# Patient Record
Sex: FEMALE | Race: WHITE | NOT HISPANIC OR LATINO | Employment: FULL TIME | URBAN - METROPOLITAN AREA
[De-identification: names, ages, dates, MRNs, and addresses within clinical notes are randomized per-mention and may not be internally consistent; named-entity substitution may affect disease eponyms.]

---

## 2018-01-16 ENCOUNTER — OFFICE VISIT CONVERTED (OUTPATIENT)
Dept: SURGERY | Facility: CLINIC | Age: 38
End: 2018-01-16
Attending: SURGERY

## 2018-01-19 ENCOUNTER — OFFICE VISIT CONVERTED (OUTPATIENT)
Dept: INTERNAL MEDICINE | Facility: CLINIC | Age: 38
End: 2018-01-19
Attending: INTERNAL MEDICINE

## 2018-03-02 ENCOUNTER — OFFICE VISIT CONVERTED (OUTPATIENT)
Dept: INTERNAL MEDICINE | Facility: CLINIC | Age: 38
End: 2018-03-02
Attending: INTERNAL MEDICINE

## 2018-05-02 ENCOUNTER — OFFICE VISIT CONVERTED (OUTPATIENT)
Dept: INTERNAL MEDICINE | Facility: CLINIC | Age: 38
End: 2018-05-02
Attending: INTERNAL MEDICINE

## 2018-08-03 ENCOUNTER — OFFICE VISIT CONVERTED (OUTPATIENT)
Dept: INTERNAL MEDICINE | Facility: CLINIC | Age: 38
End: 2018-08-03
Attending: INTERNAL MEDICINE

## 2018-11-13 ENCOUNTER — OFFICE VISIT CONVERTED (OUTPATIENT)
Dept: INTERNAL MEDICINE | Facility: CLINIC | Age: 38
End: 2018-11-13
Attending: INTERNAL MEDICINE

## 2019-02-11 ENCOUNTER — HOSPITAL ENCOUNTER (OUTPATIENT)
Dept: OTHER | Facility: HOSPITAL | Age: 39
Discharge: HOME | End: 2019-02-11
Attending: INTERNAL MEDICINE

## 2019-02-11 ENCOUNTER — OFFICE VISIT CONVERTED (OUTPATIENT)
Dept: INTERNAL MEDICINE | Facility: CLINIC | Age: 39
End: 2019-02-11
Attending: INTERNAL MEDICINE

## 2019-02-11 LAB
ALBUMIN SERPL-MCNC: 3.7 G/DL (ref 3.5–5)
ALBUMIN/GLOB SERPL: 1 {RATIO} (ref 1.4–2.6)
ALP SERPL-CCNC: 86 U/L (ref 42–98)
ALT SERPL-CCNC: 46 U/L (ref 10–40)
ANION GAP SERPL CALC-SCNC: 17 MMOL/L (ref 8–19)
AST SERPL-CCNC: 38 U/L (ref 15–50)
BASOPHILS # BLD AUTO: 0.17 10*3/UL (ref 0–0.2)
BASOPHILS NFR BLD AUTO: 1.77 % (ref 0–3)
BILIRUB SERPL-MCNC: 0.25 MG/DL (ref 0.2–1.3)
BUN SERPL-MCNC: 9 MG/DL (ref 5–25)
BUN/CREAT SERPL: 13 {RATIO} (ref 6–20)
CALCIUM SERPL-MCNC: 9.5 MG/DL (ref 8.7–10.4)
CHLORIDE SERPL-SCNC: 102 MMOL/L (ref 99–111)
CONV CO2: 25 MMOL/L (ref 22–32)
CONV TOTAL PROTEIN: 7.3 G/DL (ref 6.3–8.2)
CORTIS AM PEAK SERPL-MCNC: 10.2 UG/DL (ref 6–18.4)
CREAT UR-MCNC: 0.72 MG/DL (ref 0.5–0.9)
CRP SERPL-MCNC: 28.2 MG/L (ref 0–5)
EOSINOPHIL # BLD AUTO: 0.49 10*3/UL (ref 0–0.7)
EOSINOPHIL # BLD AUTO: 5.21 % (ref 0–7)
ERYTHROCYTE [DISTWIDTH] IN BLOOD BY AUTOMATED COUNT: 12.1 % (ref 11.5–14.5)
ERYTHROCYTE [SEDIMENTATION RATE] IN BLOOD: 60 MM/H (ref 0–20)
GFR SERPLBLD BASED ON 1.73 SQ M-ARVRAT: >60 ML/MIN/{1.73_M2}
GLOBULIN UR ELPH-MCNC: 3.6 G/DL (ref 2–3.5)
GLUCOSE SERPL-MCNC: 98 MG/DL (ref 65–99)
HBA1C MFR BLD: 14.2 G/DL (ref 12–16)
HCT VFR BLD AUTO: 42.1 % (ref 37–47)
LYMPHOCYTES # BLD AUTO: 3.01 10*3/UL (ref 1–5)
MCH RBC QN AUTO: 30.9 PG (ref 27–31)
MCHC RBC AUTO-ENTMCNC: 33.6 G/DL (ref 33–37)
MCV RBC AUTO: 91.8 FL (ref 81–99)
MONOCYTES # BLD AUTO: 0.4 10*3/UL (ref 0.2–1.2)
MONOCYTES NFR BLD AUTO: 4.27 % (ref 3–10)
NEUTROPHILS # BLD AUTO: 5.34 10*3/UL (ref 2–8)
NEUTROPHILS NFR BLD AUTO: 56.8 % (ref 30–85)
NRBC BLD AUTO-RTO: 0 % (ref 0–0.01)
OSMOLALITY SERPL CALC.SUM OF ELEC: 289 MOSM/KG (ref 273–304)
PLATELET # BLD AUTO: 420 10*3/UL (ref 130–400)
PMV BLD AUTO: 7.9 FL (ref 7.4–10.4)
POTASSIUM SERPL-SCNC: 4.4 MMOL/L (ref 3.5–5.3)
RBC # BLD AUTO: 4.58 10*6/UL (ref 4.2–5.4)
SODIUM SERPL-SCNC: 140 MMOL/L (ref 135–147)
T4 FREE SERPL-MCNC: 0.7 NG/DL (ref 0.9–1.8)
TSH SERPL-ACNC: 46.32 M[IU]/L (ref 0.27–4.2)
URATE SERPL-MCNC: 5.1 MG/DL (ref 2.5–7.5)
VARIANT LYMPHS NFR BLD MANUAL: 32 % (ref 20–45)
WBC # BLD AUTO: 9.41 10*3/UL (ref 4.8–10.8)

## 2019-02-12 LAB — CONV ANTI NUCLEAR ANTIBODY WITH REFLEX: NEGATIVE

## 2019-02-14 LAB — CCP IGA+IGG SERPL IA-ACNC: 243 UNITS (ref 0–19)

## 2019-02-15 LAB
CONV RHEUMATOID FACTOR IGA: 84 UNITS (ref 0–6)
CONV RHEUMATOID FACTOR IGG: >100 UNITS (ref 0–6)
CONV RHEUMATOID FACTOR IGM: >100 UNITS (ref 0–6)

## 2019-05-13 ENCOUNTER — HOSPITAL ENCOUNTER (OUTPATIENT)
Dept: SURGERY | Facility: CLINIC | Age: 39
Discharge: HOME | End: 2019-05-13
Attending: INTERNAL MEDICINE

## 2019-05-13 ENCOUNTER — OFFICE VISIT CONVERTED (OUTPATIENT)
Dept: INTERNAL MEDICINE | Facility: CLINIC | Age: 39
End: 2019-05-13
Attending: INTERNAL MEDICINE

## 2019-05-13 LAB
T4 FREE SERPL-MCNC: 0.7 NG/DL (ref 0.9–1.8)
TSH SERPL-ACNC: 138.7 M[IU]/L (ref 0.27–4.2)

## 2019-05-30 ENCOUNTER — HOSPITAL ENCOUNTER (OUTPATIENT)
Dept: OTHER | Facility: HOSPITAL | Age: 39
Discharge: HOME | End: 2019-05-30
Attending: INTERNAL MEDICINE

## 2019-05-30 LAB
T4 FREE SERPL-MCNC: 1.3 NG/DL (ref 0.9–1.8)
TSH SERPL-ACNC: 26.72 M[IU]/L (ref 0.27–4.2)

## 2019-08-01 ENCOUNTER — OFFICE VISIT CONVERTED (OUTPATIENT)
Dept: INTERNAL MEDICINE | Facility: CLINIC | Age: 39
End: 2019-08-01
Attending: INTERNAL MEDICINE

## 2019-08-01 ENCOUNTER — CONVERSION ENCOUNTER (OUTPATIENT)
Dept: INTERNAL MEDICINE | Facility: CLINIC | Age: 39
End: 2019-08-01

## 2019-10-03 ENCOUNTER — CONVERSION ENCOUNTER (OUTPATIENT)
Dept: INTERNAL MEDICINE | Facility: CLINIC | Age: 39
End: 2019-10-03

## 2019-10-03 ENCOUNTER — HOSPITAL ENCOUNTER (OUTPATIENT)
Dept: OTHER | Facility: HOSPITAL | Age: 39
Discharge: HOME | End: 2019-10-03
Attending: INTERNAL MEDICINE

## 2019-10-03 ENCOUNTER — OFFICE VISIT CONVERTED (OUTPATIENT)
Dept: INTERNAL MEDICINE | Facility: CLINIC | Age: 39
End: 2019-10-03
Attending: INTERNAL MEDICINE

## 2019-10-03 LAB
ALBUMIN SERPL-MCNC: 3.9 G/DL (ref 3.5–5)
ALBUMIN/GLOB SERPL: 1 {RATIO} (ref 1.4–2.6)
ALP SERPL-CCNC: 108 U/L (ref 42–98)
ALT SERPL-CCNC: 83 U/L (ref 10–40)
ANION GAP SERPL CALC-SCNC: 16 MMOL/L (ref 8–19)
AST SERPL-CCNC: 103 U/L (ref 15–50)
BASOPHILS # BLD AUTO: 0.11 10*3/UL (ref 0–0.2)
BASOPHILS NFR BLD AUTO: 0.9 % (ref 0–3)
BILIRUB SERPL-MCNC: 0.37 MG/DL (ref 0.2–1.3)
BUN SERPL-MCNC: 7 MG/DL (ref 5–25)
BUN/CREAT SERPL: 11 {RATIO} (ref 6–20)
CALCIUM SERPL-MCNC: 9.9 MG/DL (ref 8.7–10.4)
CHLORIDE SERPL-SCNC: 99 MMOL/L (ref 99–111)
CHOLEST SERPL-MCNC: 200 MG/DL (ref 107–200)
CHOLEST/HDLC SERPL: 4.1 {RATIO} (ref 3–6)
CONV ABS IMM GRAN: 0.11 10*3/UL (ref 0–0.2)
CONV CO2: 25 MMOL/L (ref 22–32)
CONV IMMATURE GRAN: 0.9 % (ref 0–1.8)
CONV TOTAL PROTEIN: 7.9 G/DL (ref 6.3–8.2)
CREAT UR-MCNC: 0.64 MG/DL (ref 0.5–0.9)
DEPRECATED RDW RBC AUTO: 49.9 FL (ref 36.4–46.3)
EOSINOPHIL # BLD AUTO: 0.88 10*3/UL (ref 0–0.7)
EOSINOPHIL # BLD AUTO: 7.6 % (ref 0–7)
ERYTHROCYTE [DISTWIDTH] IN BLOOD BY AUTOMATED COUNT: 13.7 % (ref 11.7–14.4)
EST. AVERAGE GLUCOSE BLD GHB EST-MCNC: 186 MG/DL
GFR SERPLBLD BASED ON 1.73 SQ M-ARVRAT: >60 ML/MIN/{1.73_M2}
GLOBULIN UR ELPH-MCNC: 4 G/DL (ref 2–3.5)
GLUCOSE SERPL-MCNC: 209 MG/DL (ref 65–99)
HBA1C MFR BLD: 8.1 % (ref 3.5–5.7)
HCT VFR BLD AUTO: 43.2 % (ref 37–47)
HDLC SERPL-MCNC: 49 MG/DL (ref 40–60)
HGB BLD-MCNC: 13.6 G/DL (ref 12–16)
IRON SATN MFR SERPL: 23 % (ref 20–55)
IRON SERPL-MCNC: 61 UG/DL (ref 60–170)
LDLC SERPL CALC-MCNC: 123 MG/DL (ref 70–100)
LYMPHOCYTES # BLD AUTO: 3.34 10*3/UL (ref 1–5)
LYMPHOCYTES NFR BLD AUTO: 28.7 % (ref 20–45)
MCH RBC QN AUTO: 31.2 PG (ref 27–31)
MCHC RBC AUTO-ENTMCNC: 31.5 G/DL (ref 33–37)
MCV RBC AUTO: 99.1 FL (ref 81–99)
MONOCYTES # BLD AUTO: 0.6 10*3/UL (ref 0.2–1.2)
MONOCYTES NFR BLD AUTO: 5.2 % (ref 3–10)
NEUTROPHILS # BLD AUTO: 6.6 10*3/UL (ref 2–8)
NEUTROPHILS NFR BLD AUTO: 56.7 % (ref 30–85)
NRBC CBCN: 0 % (ref 0–0.7)
OSMOLALITY SERPL CALC.SUM OF ELEC: 286 MOSM/KG (ref 273–304)
PLATELET # BLD AUTO: 374 10*3/UL (ref 130–400)
PMV BLD AUTO: 12 FL (ref 9.4–12.3)
POTASSIUM SERPL-SCNC: 4.3 MMOL/L (ref 3.5–5.3)
RBC # BLD AUTO: 4.36 10*6/UL (ref 4.2–5.4)
SODIUM SERPL-SCNC: 136 MMOL/L (ref 135–147)
T4 FREE SERPL-MCNC: 1.4 NG/DL (ref 0.9–1.8)
TIBC SERPL-MCNC: 269 UG/DL (ref 245–450)
TRANSFERRIN SERPL-MCNC: 188 MG/DL (ref 250–380)
TRIGL SERPL-MCNC: 138 MG/DL (ref 40–150)
TSH SERPL-ACNC: 21.96 M[IU]/L (ref 0.27–4.2)
VLDLC SERPL-MCNC: 28 MG/DL (ref 5–37)
WBC # BLD AUTO: 11.64 10*3/UL (ref 4.8–10.8)

## 2019-10-09 ENCOUNTER — OFFICE VISIT CONVERTED (OUTPATIENT)
Dept: INTERNAL MEDICINE | Facility: CLINIC | Age: 39
End: 2019-10-09
Attending: PHYSICIAN ASSISTANT

## 2019-12-13 ENCOUNTER — OFFICE VISIT CONVERTED (OUTPATIENT)
Dept: INTERNAL MEDICINE | Facility: CLINIC | Age: 39
End: 2019-12-13
Attending: INTERNAL MEDICINE

## 2019-12-13 ENCOUNTER — HOSPITAL ENCOUNTER (OUTPATIENT)
Dept: OTHER | Facility: HOSPITAL | Age: 39
Discharge: HOME | End: 2019-12-13
Attending: INTERNAL MEDICINE

## 2019-12-13 ENCOUNTER — CONVERSION ENCOUNTER (OUTPATIENT)
Dept: INTERNAL MEDICINE | Facility: CLINIC | Age: 39
End: 2019-12-13

## 2019-12-13 LAB
25(OH)D3 SERPL-MCNC: 9.3 NG/ML (ref 30–100)
ALBUMIN SERPL-MCNC: 4.1 G/DL (ref 3.5–5)
ALBUMIN/GLOB SERPL: 1.2 {RATIO} (ref 1.4–2.6)
ALP SERPL-CCNC: 81 U/L (ref 42–98)
ALT SERPL-CCNC: 39 U/L (ref 10–40)
ANION GAP SERPL CALC-SCNC: 16 MMOL/L (ref 8–19)
AST SERPL-CCNC: 29 U/L (ref 15–50)
BASOPHILS # BLD AUTO: 0.07 10*3/UL (ref 0–0.2)
BASOPHILS NFR BLD AUTO: 0.8 % (ref 0–3)
BILIRUB SERPL-MCNC: 0.3 MG/DL (ref 0.2–1.3)
BUN SERPL-MCNC: 10 MG/DL (ref 5–25)
BUN/CREAT SERPL: 15 {RATIO} (ref 6–20)
CALCIUM SERPL-MCNC: 9.9 MG/DL (ref 8.7–10.4)
CHLORIDE SERPL-SCNC: 102 MMOL/L (ref 99–111)
CONV ABS IMM GRAN: 0.03 10*3/UL (ref 0–0.2)
CONV CO2: 26 MMOL/L (ref 22–32)
CONV IMMATURE GRAN: 0.3 % (ref 0–1.8)
CONV TOTAL PROTEIN: 7.5 G/DL (ref 6.3–8.2)
CORTIS AM PEAK SERPL-MCNC: 5.5 UG/DL (ref 6–18.4)
CREAT UR-MCNC: 0.68 MG/DL (ref 0.5–0.9)
CRP SERPL-MCNC: 29.9 MG/L (ref 0–5)
DEPRECATED RDW RBC AUTO: 42.7 FL (ref 36.4–46.3)
EOSINOPHIL # BLD AUTO: 0.46 10*3/UL (ref 0–0.7)
EOSINOPHIL # BLD AUTO: 5.2 % (ref 0–7)
ERYTHROCYTE [DISTWIDTH] IN BLOOD BY AUTOMATED COUNT: 12 % (ref 11.7–14.4)
ERYTHROCYTE [SEDIMENTATION RATE] IN BLOOD: 75 MM/H (ref 0–20)
EST. AVERAGE GLUCOSE BLD GHB EST-MCNC: 131 MG/DL
FERRITIN SERPL-MCNC: 443 NG/ML (ref 10–200)
FOLATE SERPL-MCNC: 2.1 NG/ML (ref 4.8–20)
GFR SERPLBLD BASED ON 1.73 SQ M-ARVRAT: >60 ML/MIN/{1.73_M2}
GLOBULIN UR ELPH-MCNC: 3.4 G/DL (ref 2–3.5)
GLUCOSE SERPL-MCNC: 91 MG/DL (ref 65–99)
HBA1C MFR BLD: 6.2 % (ref 3.5–5.7)
HCT VFR BLD AUTO: 42.7 % (ref 37–47)
HGB BLD-MCNC: 13.6 G/DL (ref 12–16)
IRON SATN MFR SERPL: 19 % (ref 20–55)
IRON SERPL-MCNC: 53 UG/DL (ref 60–170)
LYMPHOCYTES # BLD AUTO: 2.85 10*3/UL (ref 1–5)
LYMPHOCYTES NFR BLD AUTO: 32 % (ref 20–45)
MAGNESIUM SERPL-MCNC: 1.92 MG/DL (ref 1.6–2.3)
MCH RBC QN AUTO: 30.8 PG (ref 27–31)
MCHC RBC AUTO-ENTMCNC: 31.9 G/DL (ref 33–37)
MCV RBC AUTO: 96.8 FL (ref 81–99)
MONOCYTES # BLD AUTO: 0.5 10*3/UL (ref 0.2–1.2)
MONOCYTES NFR BLD AUTO: 5.6 % (ref 3–10)
NEUTROPHILS # BLD AUTO: 5.01 10*3/UL (ref 2–8)
NEUTROPHILS NFR BLD AUTO: 56.1 % (ref 30–85)
NRBC CBCN: 0 % (ref 0–0.7)
OSMOLALITY SERPL CALC.SUM OF ELEC: 289 MOSM/KG (ref 273–304)
PLATELET # BLD AUTO: 437 10*3/UL (ref 130–400)
PMV BLD AUTO: 11.2 FL (ref 9.4–12.3)
POTASSIUM SERPL-SCNC: 4.4 MMOL/L (ref 3.5–5.3)
RBC # BLD AUTO: 4.41 10*6/UL (ref 4.2–5.4)
SODIUM SERPL-SCNC: 140 MMOL/L (ref 135–147)
T4 FREE SERPL-MCNC: 2.4 NG/DL (ref 0.9–1.8)
TIBC SERPL-MCNC: 273 UG/DL (ref 245–450)
TRANSFERRIN SERPL-MCNC: 191 MG/DL (ref 250–380)
TSH SERPL-ACNC: 0.02 M[IU]/L (ref 0.27–4.2)
VIT B12 SERPL-MCNC: 539 PG/ML (ref 211–911)
WBC # BLD AUTO: 8.92 10*3/UL (ref 4.8–10.8)

## 2019-12-14 LAB
ESTRADIOL SERPL HS-MCNC: 55.7 PG/ML
INSULIN SERPL-ACNC: 55.3 UIU/ML (ref 2.6–24.9)
PROGEST SERPL-MCNC: 0.1 NG/ML
SHBG SERPL-SCNC: 90.6 NMOL/L (ref 24.6–122)

## 2019-12-15 LAB — T3FREE SERPL-MCNC: 4.3 PG/ML (ref 2–4.4)

## 2019-12-16 ENCOUNTER — HOSPITAL ENCOUNTER (OUTPATIENT)
Dept: OTHER | Facility: HOSPITAL | Age: 39
Discharge: HOME | End: 2019-12-16
Attending: INTERNAL MEDICINE

## 2019-12-16 LAB — LEPTIN SERPL-MCNC: 56 NG/ML

## 2019-12-18 LAB
T3 SERPL-MCNC: 162 NG/DL (ref 71–180)
TESTOSTERONE, FREE: 1.4 PG/ML (ref 0–4.2)

## 2019-12-20 LAB — T3REVERSE SERPL-MCNC: 51.8 NG/DL (ref 9.2–24.1)

## 2019-12-21 LAB
CONV TESTOSTERONE, FREE: 2.2 PG/ML
TESTOST FREE MFR SERPL: 0.6 %
TESTOST SERPL-MCNC: 37 NG/DL

## 2020-01-10 ENCOUNTER — HOSPITAL ENCOUNTER (OUTPATIENT)
Dept: OTHER | Facility: HOSPITAL | Age: 40
Discharge: HOME | End: 2020-01-10
Attending: INTERNAL MEDICINE

## 2020-01-10 LAB
CORTIS AM PEAK SERPL-MCNC: 8.5 UG/DL (ref 6–18.4)
T4 FREE SERPL-MCNC: 1.8 NG/DL (ref 0.9–1.8)
TSH SERPL-ACNC: 0.01 M[IU]/L (ref 0.27–4.2)

## 2020-01-23 ENCOUNTER — OFFICE VISIT CONVERTED (OUTPATIENT)
Dept: INTERNAL MEDICINE | Facility: CLINIC | Age: 40
End: 2020-01-23
Attending: INTERNAL MEDICINE

## 2020-01-23 ENCOUNTER — CONVERSION ENCOUNTER (OUTPATIENT)
Dept: INTERNAL MEDICINE | Facility: CLINIC | Age: 40
End: 2020-01-23

## 2020-01-25 ENCOUNTER — HOSPITAL ENCOUNTER (OUTPATIENT)
Dept: ULTRASOUND IMAGING | Facility: HOSPITAL | Age: 40
Discharge: HOME | End: 2020-01-25
Attending: INTERNAL MEDICINE

## 2020-01-25 LAB
25(OH)D3 SERPL-MCNC: 12.2 NG/ML (ref 30–100)
CRP SERPL-MCNC: 26 MG/L (ref 0–5)
ERYTHROCYTE [SEDIMENTATION RATE] IN BLOOD: 60 MM/H (ref 0–20)
FERRITIN SERPL-MCNC: 438 NG/ML (ref 10–200)
FOLATE SERPL-MCNC: 4.1 NG/ML (ref 4.8–20)

## 2020-01-27 LAB
CONV ANTI MICROSOMAL AB: 12 IU/ML (ref 0–34)
SELENIUM SERPL-MCNC: 149 UG/L (ref 91–198)
THYROGLOBULIN ANTIBODY: 30 IU/ML (ref 0–0.9)

## 2020-01-28 LAB
DSDNA AB SER-ACNC: NEGATIVE [IU]/ML
ENA AB SER IA-ACNC: NEGATIVE {RATIO}

## 2020-03-09 ENCOUNTER — HOSPITAL ENCOUNTER (OUTPATIENT)
Dept: DIABETES SERVICES | Facility: HOSPITAL | Age: 40
Setting detail: RECURRING SERIES
Discharge: HOME | End: 2020-06-07
Attending: INTERNAL MEDICINE

## 2020-03-23 ENCOUNTER — TELEMEDICINE CONVERTED (OUTPATIENT)
Dept: INTERNAL MEDICINE | Facility: CLINIC | Age: 40
End: 2020-03-23
Attending: INTERNAL MEDICINE

## 2020-04-27 ENCOUNTER — HOSPITAL ENCOUNTER (OUTPATIENT)
Dept: LAB | Facility: HOSPITAL | Age: 40
Discharge: HOME | End: 2020-04-27
Attending: INTERNAL MEDICINE

## 2020-04-27 LAB
25(OH)D3 SERPL-MCNC: 17.4 NG/ML (ref 30–100)
ALBUMIN SERPL-MCNC: 4 G/DL (ref 3.5–5)
ALBUMIN/GLOB SERPL: 1.1 {RATIO} (ref 1.4–2.6)
ALP SERPL-CCNC: 61 U/L (ref 42–98)
ALT SERPL-CCNC: 17 U/L (ref 10–40)
ANION GAP SERPL CALC-SCNC: 22 MMOL/L (ref 8–19)
AST SERPL-CCNC: 20 U/L (ref 15–50)
BASOPHILS # BLD AUTO: 0.1 10*3/UL (ref 0–0.2)
BASOPHILS # BLD: 2 % (ref 0–3)
BASOPHILS NFR BLD AUTO: 1 % (ref 0–3)
BILIRUB SERPL-MCNC: 0.22 MG/DL (ref 0.2–1.3)
BUN SERPL-MCNC: 10 MG/DL (ref 5–25)
BUN/CREAT SERPL: 15 {RATIO} (ref 6–20)
CALCIUM SERPL-MCNC: 9.9 MG/DL (ref 8.7–10.4)
CHLORIDE SERPL-SCNC: 102 MMOL/L (ref 99–111)
CONV ABS BANDS: 0 % (ref 1–5)
CONV ABS IMM GRAN: 0.06 10*3/UL (ref 0–0.2)
CONV ANISOCYTES: SLIGHT
CONV CO2: 22 MMOL/L (ref 22–32)
CONV HYPOCHROMIA IN BLOOD BY LIGHT MICROSCOPY: SLIGHT
CONV IMMATURE GRAN: 0.6 % (ref 0–1.8)
CONV SEGMENTED NEUTROPHILS: 48 % (ref 45–70)
CONV TOTAL PROTEIN: 7.7 G/DL (ref 6.3–8.2)
CREAT UR-MCNC: 0.65 MG/DL (ref 0.5–0.9)
CRP SERPL-MCNC: 28.8 MG/L (ref 0–5)
DEPRECATED RDW RBC AUTO: 44.3 FL (ref 36.4–46.3)
EOSINOPHIL # BLD AUTO: 0.67 10*3/UL (ref 0–0.7)
EOSINOPHIL # BLD AUTO: 6.5 % (ref 0–7)
EOSINOPHIL NFR BLD AUTO: 4 % (ref 0–7)
ERYTHROCYTE [DISTWIDTH] IN BLOOD BY AUTOMATED COUNT: 12.9 % (ref 11.7–14.4)
ERYTHROCYTE [SEDIMENTATION RATE] IN BLOOD: 51 MM/H (ref 0–20)
FERRITIN SERPL-MCNC: 320 NG/ML (ref 10–200)
FOLATE SERPL-MCNC: 5.5 NG/ML (ref 4.8–20)
GFR SERPLBLD BASED ON 1.73 SQ M-ARVRAT: >60 ML/MIN/{1.73_M2}
GLOBULIN UR ELPH-MCNC: 3.7 G/DL (ref 2–3.5)
GLUCOSE SERPL-MCNC: 174 MG/DL (ref 65–99)
HCT VFR BLD AUTO: 41.6 % (ref 37–47)
HGB BLD-MCNC: 13.1 G/DL (ref 12–16)
IRON SATN MFR SERPL: 20 % (ref 20–55)
IRON SERPL-MCNC: 67 UG/DL (ref 60–170)
LYMPHOCYTES # BLD AUTO: 3.78 10*3/UL (ref 1–5)
LYMPHOCYTES NFR BLD AUTO: 36.7 % (ref 20–45)
MCH RBC QN AUTO: 29.5 PG (ref 27–31)
MCHC RBC AUTO-ENTMCNC: 31.5 G/DL (ref 33–37)
MCV RBC AUTO: 93.7 FL (ref 81–99)
MONOCYTES # BLD AUTO: 0.54 10*3/UL (ref 0.2–1.2)
MONOCYTES NFR BLD AUTO: 5.2 % (ref 3–10)
MONOCYTES NFR BLD MANUAL: 9 % (ref 3–10)
NEUTROPHILS # BLD AUTO: 5.15 10*3/UL (ref 2–8)
NEUTROPHILS NFR BLD AUTO: 50 % (ref 30–85)
NRBC CBCN: 0 % (ref 0–0.7)
NUC CELL # PRT MANUAL: 0 /100{WBCS}
OSMOLALITY SERPL CALC.SUM OF ELEC: 297 MOSM/KG (ref 273–304)
PLAT MORPH BLD: NORMAL
PLATELET # BLD AUTO: 446 10*3/UL (ref 130–400)
PMV BLD AUTO: 10.8 FL (ref 9.4–12.3)
POTASSIUM SERPL-SCNC: 4.1 MMOL/L (ref 3.5–5.3)
RBC # BLD AUTO: 4.44 10*6/UL (ref 4.2–5.4)
SMALL PLATELETS BLD QL SMEAR: ABNORMAL
SODIUM SERPL-SCNC: 142 MMOL/L (ref 135–147)
TIBC SERPL-MCNC: 342 UG/DL (ref 245–450)
TRANSFERRIN SERPL-MCNC: 239 MG/DL (ref 250–380)
VARIANT LYMPHS NFR BLD MANUAL: 37 % (ref 20–45)
WBC # BLD AUTO: 10.3 10*3/UL (ref 4.8–10.8)

## 2020-04-28 LAB — CONV IMMUNOGLOBULIN G (IGG): 990 MG/DL (ref 586–1602)

## 2020-04-29 LAB
DSDNA AB SER-ACNC: NEGATIVE [IU]/ML
ENA AB SER IA-ACNC: NEGATIVE {RATIO}

## 2020-04-30 ENCOUNTER — TELEMEDICINE CONVERTED (OUTPATIENT)
Dept: INTERNAL MEDICINE | Facility: CLINIC | Age: 40
End: 2020-04-30
Attending: INTERNAL MEDICINE

## 2020-04-30 LAB
T4 FREE SERPL-MCNC: 1.6 NG/DL (ref 0.9–1.8)
TSH SERPL-ACNC: 0.08 M[IU]/L (ref 0.27–4.2)

## 2020-06-26 ENCOUNTER — TELEMEDICINE CONVERTED (OUTPATIENT)
Dept: INTERNAL MEDICINE | Facility: CLINIC | Age: 40
End: 2020-06-26
Attending: NURSE PRACTITIONER

## 2020-08-31 ENCOUNTER — HOSPITAL ENCOUNTER (OUTPATIENT)
Dept: OTHER | Facility: HOSPITAL | Age: 40
Discharge: HOME | End: 2020-08-31
Attending: INTERNAL MEDICINE

## 2020-08-31 LAB
ALBUMIN SERPL-MCNC: 4 G/DL (ref 3.5–5)
ALBUMIN/GLOB SERPL: 1.1 {RATIO} (ref 1.4–2.6)
ALP SERPL-CCNC: 72 U/L (ref 42–98)
ALT SERPL-CCNC: 19 U/L (ref 10–40)
ANION GAP SERPL CALC-SCNC: 18 MMOL/L (ref 8–19)
AST SERPL-CCNC: 18 U/L (ref 15–50)
BASOPHILS # BLD AUTO: 0.07 10*3/UL (ref 0–0.2)
BASOPHILS NFR BLD AUTO: 0.6 % (ref 0–3)
BILIRUB SERPL-MCNC: 0.26 MG/DL (ref 0.2–1.3)
BUN SERPL-MCNC: 12 MG/DL (ref 5–25)
BUN/CREAT SERPL: 11 {RATIO} (ref 6–20)
CALCIUM SERPL-MCNC: 10.7 MG/DL (ref 8.7–10.4)
CHLORIDE SERPL-SCNC: 101 MMOL/L (ref 99–111)
CHOLEST SERPL-MCNC: 184 MG/DL (ref 107–200)
CHOLEST/HDLC SERPL: 3.5 {RATIO} (ref 3–6)
CONV ABS IMM GRAN: 0.06 10*3/UL (ref 0–0.2)
CONV CO2: 23 MMOL/L (ref 22–32)
CONV IMMATURE GRAN: 0.5 % (ref 0–1.8)
CONV TOTAL PROTEIN: 7.5 G/DL (ref 6.3–8.2)
CREAT UR-MCNC: 1.05 MG/DL (ref 0.5–0.9)
DEPRECATED RDW RBC AUTO: 43 FL (ref 36.4–46.3)
EOSINOPHIL # BLD AUTO: 0.66 10*3/UL (ref 0–0.7)
EOSINOPHIL # BLD AUTO: 5.3 % (ref 0–7)
ERYTHROCYTE [DISTWIDTH] IN BLOOD BY AUTOMATED COUNT: 12.6 % (ref 11.7–14.4)
FOLATE SERPL-MCNC: 17.7 NG/ML (ref 4.8–20)
GFR SERPLBLD BASED ON 1.73 SQ M-ARVRAT: >60 ML/MIN/{1.73_M2}
GLOBULIN UR ELPH-MCNC: 3.5 G/DL (ref 2–3.5)
GLUCOSE SERPL-MCNC: 138 MG/DL (ref 65–99)
HCT VFR BLD AUTO: 43.7 % (ref 37–47)
HDLC SERPL-MCNC: 52 MG/DL (ref 40–60)
HGB BLD-MCNC: 13.9 G/DL (ref 12–16)
LDLC SERPL CALC-MCNC: 106 MG/DL (ref 70–100)
LYMPHOCYTES # BLD AUTO: 3.46 10*3/UL (ref 1–5)
LYMPHOCYTES NFR BLD AUTO: 27.9 % (ref 20–45)
MCH RBC QN AUTO: 29.5 PG (ref 27–31)
MCHC RBC AUTO-ENTMCNC: 31.8 G/DL (ref 33–37)
MCV RBC AUTO: 92.8 FL (ref 81–99)
MONOCYTES # BLD AUTO: 0.47 10*3/UL (ref 0.2–1.2)
MONOCYTES NFR BLD AUTO: 3.8 % (ref 3–10)
NEUTROPHILS # BLD AUTO: 7.7 10*3/UL (ref 2–8)
NEUTROPHILS NFR BLD AUTO: 61.9 % (ref 30–85)
NRBC CBCN: 0 % (ref 0–0.7)
OSMOLALITY SERPL CALC.SUM OF ELEC: 288 MOSM/KG (ref 273–304)
PLATELET # BLD AUTO: 440 10*3/UL (ref 130–400)
PMV BLD AUTO: 11 FL (ref 9.4–12.3)
POTASSIUM SERPL-SCNC: 4.2 MMOL/L (ref 3.5–5.3)
RBC # BLD AUTO: 4.71 10*6/UL (ref 4.2–5.4)
SODIUM SERPL-SCNC: 138 MMOL/L (ref 135–147)
TRIGL SERPL-MCNC: 128 MG/DL (ref 40–150)
VIT B12 SERPL-MCNC: 396 PG/ML (ref 211–911)
VLDLC SERPL-MCNC: 26 MG/DL (ref 5–37)
WBC # BLD AUTO: 12.42 10*3/UL (ref 4.8–10.8)

## 2020-09-02 ENCOUNTER — HOSPITAL ENCOUNTER (OUTPATIENT)
Dept: URGENT CARE | Facility: CLINIC | Age: 40
Discharge: HOME | End: 2020-09-02
Attending: INTERNAL MEDICINE

## 2020-09-06 LAB — SARS-COV-2 RNA SPEC QL NAA+PROBE: NOT DETECTED

## 2020-09-25 ENCOUNTER — CONVERSION ENCOUNTER (OUTPATIENT)
Dept: INTERNAL MEDICINE | Facility: CLINIC | Age: 40
End: 2020-09-25

## 2020-09-25 ENCOUNTER — HOSPITAL ENCOUNTER (OUTPATIENT)
Dept: OTHER | Facility: HOSPITAL | Age: 40
Discharge: HOME | End: 2020-09-25
Attending: INTERNAL MEDICINE

## 2020-09-25 ENCOUNTER — OFFICE VISIT CONVERTED (OUTPATIENT)
Dept: INTERNAL MEDICINE | Facility: CLINIC | Age: 40
End: 2020-09-25
Attending: INTERNAL MEDICINE

## 2020-09-25 LAB
BASOPHILS # BLD AUTO: 0.09 10*3/UL (ref 0–0.2)
BASOPHILS NFR BLD AUTO: 0.9 % (ref 0–3)
CONV ABS IMM GRAN: 0.06 10*3/UL (ref 0–0.2)
CONV IMMATURE GRAN: 0.6 % (ref 0–1.8)
DEPRECATED RDW RBC AUTO: 45.3 FL (ref 36.4–46.3)
EOSINOPHIL # BLD AUTO: 0.61 10*3/UL (ref 0–0.7)
EOSINOPHIL # BLD AUTO: 5.8 % (ref 0–7)
ERYTHROCYTE [DISTWIDTH] IN BLOOD BY AUTOMATED COUNT: 12.9 % (ref 11.7–14.4)
HCT VFR BLD AUTO: 41.8 % (ref 37–47)
HGB BLD-MCNC: 12.9 G/DL (ref 12–16)
LYMPHOCYTES # BLD AUTO: 3.73 10*3/UL (ref 1–5)
LYMPHOCYTES NFR BLD AUTO: 35.5 % (ref 20–45)
MCH RBC QN AUTO: 29.5 PG (ref 27–31)
MCHC RBC AUTO-ENTMCNC: 30.9 G/DL (ref 33–37)
MCV RBC AUTO: 95.4 FL (ref 81–99)
MONOCYTES # BLD AUTO: 0.43 10*3/UL (ref 0.2–1.2)
MONOCYTES NFR BLD AUTO: 4.1 % (ref 3–10)
NEUTROPHILS # BLD AUTO: 5.6 10*3/UL (ref 2–8)
NEUTROPHILS NFR BLD AUTO: 53.1 % (ref 30–85)
NRBC CBCN: 0 % (ref 0–0.7)
PLATELET # BLD AUTO: 450 10*3/UL (ref 130–400)
PMV BLD AUTO: 11.2 FL (ref 9.4–12.3)
RBC # BLD AUTO: 4.38 10*6/UL (ref 4.2–5.4)
WBC # BLD AUTO: 10.52 10*3/UL (ref 4.8–10.8)

## 2020-09-26 LAB
25(OH)D3 SERPL-MCNC: 27.6 NG/ML (ref 30–100)
ALBUMIN SERPL-MCNC: 3.9 G/DL (ref 3.5–5)
ALBUMIN/GLOB SERPL: 1.2 {RATIO} (ref 1.4–2.6)
ALP SERPL-CCNC: 65 U/L (ref 42–98)
ALT SERPL-CCNC: 15 U/L (ref 10–40)
ANION GAP SERPL CALC-SCNC: 16 MMOL/L (ref 8–19)
AST SERPL-CCNC: 15 U/L (ref 15–50)
BILIRUB SERPL-MCNC: 0.32 MG/DL (ref 0.2–1.3)
BUN SERPL-MCNC: 9 MG/DL (ref 5–25)
BUN/CREAT SERPL: 12 {RATIO} (ref 6–20)
CALCIUM SERPL-MCNC: 9.5 MG/DL (ref 8.7–10.4)
CHLORIDE SERPL-SCNC: 102 MMOL/L (ref 99–111)
CONV CO2: 25 MMOL/L (ref 22–32)
CONV TOTAL PROTEIN: 7.2 G/DL (ref 6.3–8.2)
CORTIS PM SERPL-MCNC: 11.5 UG/DL (ref 2.7–10.5)
CREAT UR-MCNC: 0.77 MG/DL (ref 0.5–0.9)
GFR SERPLBLD BASED ON 1.73 SQ M-ARVRAT: >60 ML/MIN/{1.73_M2}
GLOBULIN UR ELPH-MCNC: 3.3 G/DL (ref 2–3.5)
GLUCOSE SERPL-MCNC: 110 MG/DL (ref 65–99)
OSMOLALITY SERPL CALC.SUM OF ELEC: 287 MOSM/KG (ref 273–304)
POTASSIUM SERPL-SCNC: 4 MMOL/L (ref 3.5–5.3)
SODIUM SERPL-SCNC: 139 MMOL/L (ref 135–147)
T4 FREE SERPL-MCNC: 1.6 NG/DL (ref 0.9–1.8)
TSH SERPL-ACNC: 0.06 M[IU]/L (ref 0.27–4.2)

## 2020-09-28 LAB — PTH-INTACT SERPL-MCNC: 24 PG/ML (ref 15–65)

## 2020-11-16 ENCOUNTER — OFFICE VISIT CONVERTED (OUTPATIENT)
Dept: SURGERY | Facility: CLINIC | Age: 40
End: 2020-11-16
Attending: SURGERY

## 2020-11-30 ENCOUNTER — OFFICE VISIT CONVERTED (OUTPATIENT)
Dept: SURGERY | Facility: CLINIC | Age: 40
End: 2020-11-30
Attending: SURGERY

## 2021-05-03 ENCOUNTER — CONVERSION ENCOUNTER (OUTPATIENT)
Dept: CARDIOLOGY | Facility: CLINIC | Age: 41
End: 2021-05-03

## 2021-05-10 NOTE — H&P
History and Physical      Patient Name: Beena Osborne   Patient ID: [id number]   Sex: Female   YOB: 1980    Primary Care Provider: Beena López MD   Referring Provider: Beena López MD    Visit Date: November 16, 2020    Provider: Dawson Thompson MD   Location: [facility]   Location Address: [address] [place] [address]  [id number]   Location Phone: [phone]          Chief Complaint  · hidradenitis suppurativa      History Of Present Illness     Beena came in today with complaints of skin irritation under both axillae. She is not having any drainage but is having some discomfort.       Past Medical History  Allergic rhinitis; Anemia; Anxiety; Asthma; B12 deficiency; Depression; Difficulty concentrating; Graves disease; Hyperlipidemia; Impaired fasting glucose; Morbid Obesity; PCOS (polycystic ovarian syndrome); Primary insomnia; Psoriasis; RA (rheumatoid arthritis); Secondary hypothyroidism; Vitamin D Deficiency         Past Surgical History  Appendectomy; Cyst Excision; Incision and drainage abscess; Ovarian cystectomy; Septoplasty; Sinus Surgery         Medication List  Accu-Chek Maddie Plus test strp miscellaneous strip; Allegra Allergy 180 mg oral tablet; Amino Acid oral capsule; Bactrim -160 mg oral tablet; dexamethasone 1 mg oral tablet; folic acid 800 mcg oral tablet; Iodine; Larissia 0.1-20 mg-mcg oral tablet; losartan 50 mg oral tablet; montelukast 10 mg oral tablet; prednisone 10 mg oral tablet; QNASL 80 mcg/actuation nasal HFA aerosol inhaler; Selenium; Singulair 10 mg oral tablet; Synthroid 200 mcg oral tablet; Ventolin HFA 90 mcg/actuation inhalation HFA aerosol inhaler; Vitamin D2 1,250 mcg (50,000 unit) oral capsule         Allergy List  amlodipine; Demerol; TETANUS; Zofran         Family Medical History  Stomach Neoplasm, Malignant; Brain Neoplasm, Malignant; Lung cancer; Testicular Cancer         Social History  Tobacco (Never)  "        Review of Systems  · Integument  o Admits  o : skin lesion or lump      Vitals  Date Time BP Position Site L\R Cuff Size HR RR TEMP (F) WT  HT  BMI kg/m2 BSA m2 O2 Sat FR L/min FiO2 HC       11/16/2020 01:08 PM       18  287lbs 0oz 5'  4\" 49.26 2.42             Physical Examination     Today on physical exam, she has evidence of furunculitis consistent with hidradenitis suppurativa.           Assessment  · Hidradenitis suppurativa     705.83/L73.2       At this point, her disease doesn't look too severe. I don't see evidence of any type of undrained abscess.       Plan  · Medications  o Medications have been Reconciled  o Transition of Care or Provider Policy     We are going to put her on some oral antibiotics for about ten days. I will see her back in two weeks and see what it looks like then.             Electronically Signed by: Tori Patricio-, -Author on November 16, 2020 03:03:36 PM  Electronically Co-signed by: Dawson Thompson MD -Reviewer on November 18, 2020 01:12:35 PM  "

## 2021-05-10 NOTE — H&P
History and Physical      Patient Name: Beena Osborne   Patient ID: [id number]   Sex: Female   YOB: 1980    Primary Care Provider: Beena López MD   Referring Provider: Beena López MD    Visit Date: November 30, 2020    Provider: Dawson Thompson MD   Location: [facility]   Location Address: [address] [place] [address]  [id number]   Location Phone: [phone]          Chief Complaint  · hidradenitis      History Of Present Illness     Beena came back for follow-up. She is doing better. Her axillary disease has improved but she is having some new drainage from the left posterior neck.       Past Medical History  Allergic rhinitis; Anemia; Anxiety; Asthma; B12 deficiency; Depression; Difficulty concentrating; Graves disease; Hyperlipidemia; Impaired fasting glucose; Morbid Obesity; PCOS (polycystic ovarian syndrome); Primary insomnia; Psoriasis; RA (rheumatoid arthritis); Secondary hypothyroidism; Vitamin D Deficiency         Past Surgical History  Appendectomy; Cyst Excision; Incision and drainage abscess; Ovarian cystectomy; Septoplasty; Sinus Surgery         Medication List  Accu-Chek Maddie Plus test strp miscellaneous strip; Allegra Allergy 180 mg oral tablet; Amino Acid oral capsule; Bactrim -160 mg oral tablet; dexamethasone 1 mg oral tablet; folic acid 800 mcg oral tablet; Iodine; Larissia 0.1-20 mg-mcg oral tablet; losartan 50 mg oral tablet; montelukast 10 mg oral tablet; prednisone 10 mg oral tablet; QNASL 80 mcg/actuation nasal HFA aerosol inhaler; Selenium; Singulair 10 mg oral tablet; Synthroid 200 mcg oral tablet; Ventolin HFA 90 mcg/actuation inhalation HFA aerosol inhaler; Vitamin D2 1,250 mcg (50,000 unit) oral capsule         Allergy List  amlodipine; Demerol; TETANUS; Zofran         Family Medical History  Stomach Neoplasm, Malignant; Brain Neoplasm, Malignant; Lung cancer; Testicular Cancer         Social History  Tobacco (Never)  "        Vitals  Date Time BP Position Site L\R Cuff Size HR RR TEMP (F) WT  HT  BMI kg/m2 BSA m2 O2 Sat FR L/min FiO2 HC       11/30/2020 02:08 PM       15  292lbs 16oz 5'  3\" 51.9 2.43             Physical Examination     Today on physical exam, the hidradenitis in both axillae look much improved. There is nothing really draining today. She does have evidence of some hidradenitis along her left posterior neck. She indicated that it had been draining but I don't see any drainage today.           Assessment  · Hidradenitis suppurativa     705.83/L73.2       Hidradenitis of both axillae as well as some disease on the left posterior neck.       Plan  · Medications  o Medications have been Reconciled  o Transition of Care or Provider Policy     We will keep her on antibiotics perhaps one more week and I have asked her to call me should she have any further trouble.             Electronically Signed by: Tori Patricio-, -Author on November 30, 2020 04:08:57 PM  Electronically Co-signed by: Dawson Thompson MD -Reviewer on December 1, 2020 10:10:04 AM  "

## 2021-05-12 NOTE — PROGRESS NOTES
Progress Note      Patient Name: Beena Osborne   Patient ID: [id number]   Sex: Female   YOB: 1980    Primary Care Provider: Beena López MD   Referring Provider: Beena López MD    Visit Date: April 30, 2020    Provider: Beena López MD   Location: [facility]   Location Address: [address] [place] [address]  [id number]   Location Phone: [phone]          Chief Complaint  · follow up, no new concerns      History Of Present Illness  Video Conferencing Visit  Beena Osborne is a 39 year old /White female who is presenting for evaluation via video conferencing. Verbal consent obtained before beginning visit.   The following staff were present during this visit: Melvi Irwin   eBena Osborne is a 39 year old /White female who presents for evaluation and treatment of:      states that she is really noticing the difference in her hands    Bryanna Urbano in [facility]  they started her on prednisone    the GI doctor suggested ordering more labs  they said there was a good huge drop in her test result with her liver    states that she hasn't been doing as well with her diet and exercise   since she is at home  she is going to the kitchen a lot she is back up to about 290    reviewed labs from [facility] that showed significant inflammation    call done over zoom       Past Medical History  Disease Name Date Onset Notes   Allergic rhinitis 07/10/2015 Will add Flonase 2 cetirizine   Anemia --  --    Anxiety --  --    Asthma --  --    B12 deficiency --  --    Depression --  --    Difficulty concentrating 07/15/2016 will try straterra   Graves disease 07/10/2015 --    Hyperlipidemia 07/10/2015 Will check labs and adjust meds accordingly   Impaired fasting glucose --  --    Morbid Obesity --  --    PCOS (polycystic ovarian syndrome) --  --    Primary insomnia 10/25/2015 will refer for sleep study   Psoriasis 07/10/2015  Seems to be improving I believe she was on steroids at the hospital which is no longer on I do not want her to be on steroids further until her blood pressure is well controlled   RA (rheumatoid arthritis) --  --    Secondary hypothyroidism 07/10/2015 --    Vitamin D Deficiency --  --          Past Surgical History  Procedure Name Date Notes   Appendectomy 1984 --    Cyst Excision --  neck   Incision and drainage abscess --  neck abscess   Ovarian cystectomy 1999 --    Septoplasty 2012 --    Sinus Surgery 2012 --          Medication List  Name Date Started Instructions   Accu-Chek Maddie Plus test strp miscellaneous strip 12/16/2019 use as directed test bid prn   Berberine  Take 1 tablet by mouth twice a day   Cinnamon 500 mg oral capsule  --    Iodine  300 mcg once daily   Larissia 0.1-20 mg-mcg oral tablet  take 1 tablet by oral route once daily   losartan 50 mg oral tablet 03/05/2020 TAKE 1 TABLET (50 MG) BY ORAL ROUTE ONCE DAILY   metformin 500 mg oral tablet  take 1 tablet (500 mg) by oral route 2 times per day with morning and evening meals   prednisone 10 mg oral tablet  taking 5 mg as needed   QNASL 80 mcg/actuation nasal HFA aerosol inhaler 12/19/2019 spray 2 sprays (160 mcg) in each nostril by intranasal route once daily   Selenium  100 mcg once daily   Singulair 10 mg oral tablet 11/07/2019 TAKE 1 TABLET (10 MG) BY ORAL ROUTE ONCE DAILY IN THE EVENING FOR 90 DAYS   Synthroid 200 mcg oral tablet 04/20/2020 TAKE 1 TABLET DAILY   Ventolin HFA 90 mcg/actuation inhalation HFA aerosol inhaler 10/03/2019 inhale 1 - 2 puffs (90 - 180 mcg) by inhalation route every 6 hours as needed   Vitamin D2 1,250 mcg (50,000 unit) oral capsule 04/30/2020 take 1 capsule by oral route twice a week         Allergy List  Allergen Name Date Reaction Notes   amlodipine --  severe diarrhea --    TETANUS --  vomiting and fever --        Allergies Reconciled  Family Medical History  Disease Name Relative/Age Notes   Stomach Neoplasm,  Malignant Grandmother (maternal)/   --    Brain Neoplasm, Malignant Grandfather (maternal)/   --    Lung cancer Grandfather (paternal)/   --    Testicular Cancer Father/   --          Social History  Finding Status Start/Stop Quantity Notes   Tobacco Never --/-- --  --          Immunizations  NameDate Admin Mfg Trade Name Lot Number Route Inj VIS Given VIS Publication   Hepatitis A02/11/2019 MSD VAQTA-ADULT [id number] IM RD 02/11/2019 07/20/2016   Comments: pt tolerated well and left office in stable condition, KATHI Renner   Hepatitis A05/02/2018 SKB HAVRIX-ADULT  IM LD 05/02/2018 07/20/2016   Comments: patient tolerated well, left office in stable condition   Vrfqoubxi53/03/2019 SKB Fluzone Quadrivalent [id number] IM RD 10/03/2019    Comments: pt tolerated well and left office in stable condition, Kathi Nathan   Fopyeigmv23/13/2018 SKB Fluzone Quadrivalent [id number] IM RD 11/13/2018 08/07/2015   Comments: pt tolerated well and left office in stable condition, KATHI Nathan   Twclnoivi32/21/2016 SKB Fluarix, quadrivalent, preservative free [id number] IM RD 10/21/2016 08/19/2014   Comments: Patient tolerated well, left office in stable condition.   Evywctuho61/07/2015 SKB Fluarix, quadrivalent, preservative free [id number] IM  10/07/2015 08/19/2014   Comments: pt tolerated well. Left office in stable condition.   TdapRefused 08/03/2018 NE Not Entered  NE NE     Comments: pt has allergy to vaccine         Review of Systems  · Constitutional  o Denies  o : fever, fatigue, weight loss, weight gain  · Cardiovascular  o Denies  o : lower extremity edema, claudication, chest pressure, palpitations  · Respiratory  o Denies  o : shortness of breath, wheezing, cough, hemoptysis, dyspnea on exertion  · Gastrointestinal  o Denies  o : nausea, vomiting, diarrhea, constipation, abdominal pain      Physical Examination     Gen: well-nourished, no acute distress, morbidly obese  HENT: atraumatic, normocephalic  Eyes: extraocular movements intact, no  scleral icterus  Lung: breathing comfortably, no cough  Skin: no visible rash, no lesions  Neuro: grossly oriented to person, place, and time. no facial droop   Psych: normal mood and affect               Assessment  · Graves disease     242.00/E05.00  · Hyperlipidemia     272.4/E78.5  Will check labs and adjust meds accordingly  · Secondary hypothyroidism     244.8/E03.8  · Morbid Obesity     278.01/E66.01  · Rheumatoid arthritis     714.0/M06.9       will try to add thyroid to hospital labs, if not just repeat in 6 weeks    cont prednisone for now  get note from rheumatology    disucssed that inflammatory labs are significantly elevated    encouraged dietary modification    will see in 6 weeks and repeat labs at that time       Plan  · Orders  o ACO-39: Current medications updated and reviewed () - - 04/30/2020  · Medications  o Vitamin D2 1,250 mcg (50,000 unit) oral capsule   SIG: take 1 capsule by oral route twice a week   DISP: (26) capsules with 0 refills  Adjusted on 04/30/2020     o Medications have been Reconciled  o Transition of Care or Provider Policy  · Instructions  o Advised that cheeses and other sources of dairy fats, animal fats, fast food, and the extras (candy, pastries, pies, doughnuts and cookies) all contain LDL raising nutrients. Advised to increase fruits, vegetables, whole grains, and to monitor portion sizes.   o Patient was educated/instructed on their diagnosis, treatment and medications prior to discharge from the clinic today.            Electronically Signed by: Beena López MD -Author on May 3, 2020 04:34:11 PM

## 2021-05-13 NOTE — PROGRESS NOTES
"   Progress Note      Patient Name: Beena Osborne   Patient ID: [id number]   Sex: Female   YOB: 1980    Primary Care Provider: Beena López MD   Referring Provider: Beena López MD    Visit Date: September 25, 2020    Provider: Beena López MD   Location: [facility]   Location Address: [address] [place] [address]  [id number]   Location Phone: [phone]          Chief Complaint  · \"I have been dealing with really bad vertigo\"  · \"I have been having really bad muscle and bone pain with this vertigo\"      History Of Present Illness  Beena Osborne is a 39 year old /White female who presents for evaluation and treatment of:      suddenly sunday she developed severe vertigo  she did take a prednisone  she is vomiting significantly  all her muscles hurt  she is \"walking into walls\"  states that all her bones and her calves are hurting as well  However states that today she is already starting to feel somewhat better  She is walking better       Past Medical History  Disease Name Date Onset Notes   Allergic rhinitis 07/10/2015 Will add Flonase 2 cetirizine   Anemia --  --    Anxiety --  --    Asthma --  --    B12 deficiency --  --    Depression --  --    Difficulty concentrating 07/15/2016 will try straterra   Graves disease 07/10/2015 --    Hyperlipidemia 07/10/2015 Will check labs and adjust meds accordingly   Impaired fasting glucose --  --    Morbid Obesity --  --    PCOS (polycystic ovarian syndrome) --  --    Primary insomnia 10/25/2015 will refer for sleep study   Psoriasis 07/10/2015 Seems to be improving I believe she was on steroids at the hospital which is no longer on I do not want her to be on steroids further until her blood pressure is well controlled   RA (rheumatoid arthritis) --  --    Secondary hypothyroidism 07/10/2015 --    Vitamin D Deficiency --  --          Past Surgical History  Procedure Name Date Notes "   Appendectomy 1984 --    Cyst Excision --  neck   Incision and drainage abscess --  neck abscess   Ovarian cystectomy 1999 --    Septoplasty 2012 --    Sinus Surgery 2012 --          Medication List  Name Date Started Instructions   Accu-Chek Maddie Plus test strp miscellaneous strip 12/16/2019 use as directed test bid prn   Amino Acid oral capsule  take 1 capsule by oral route daily   dexamethasone 1 mg oral tablet 10/02/2020 Take 1 tablet by mouth at 11PM the night before test   folic acid 800 mcg oral tablet  take 1 tablet (0.8 mg) by oral route once daily   Iodine  300 mcg once daily   Larissia 0.1-20 mg-mcg oral tablet  take 1 tablet by oral route once daily   losartan 50 mg oral tablet 08/28/2020 TAKE 1 TABLET ONCE DAILY   prednisone 10 mg oral tablet  taking 5 mg as needed   QNASL 80 mcg/actuation nasal HFA aerosol inhaler 12/19/2019 spray 2 sprays (160 mcg) in each nostril by intranasal route once daily   Selenium  100 mcg once daily   Singulair 10 mg oral tablet 11/07/2019 TAKE 1 TABLET (10 MG) BY ORAL ROUTE ONCE DAILY IN THE EVENING FOR 90 DAYS   Synthroid 200 mcg oral tablet 07/22/2020 TAKE 1 TABLET DAILY   Ventolin HFA 90 mcg/actuation inhalation HFA aerosol inhaler 10/03/2019 inhale 1 - 2 puffs (90 - 180 mcg) by inhalation route every 6 hours as needed   Vitamin D2 1,250 mcg (50,000 unit) oral capsule 09/14/2020 take 1 capsule by oral route twice a week for 90 days         Allergy List  Allergen Name Date Reaction Notes   amlodipine --  severe diarrhea --    Demerol --  hallucinations --    TETANUS --  vomiting and fever --    Zofran --  fever --        Allergies Reconciled  Family Medical History  Disease Name Relative/Age Notes   Stomach Neoplasm, Malignant Grandmother (maternal)/   --    Brain Neoplasm, Malignant Grandfather (maternal)/   --    Lung cancer Grandfather (paternal)/   --    Testicular Cancer Father/   --          Social History  Finding Status Start/Stop Quantity Notes   Tobacco Never  "--/-- --  --          Immunizations  NameDate Admin Mfg Trade Name Lot Number Route Inj VIS Given VIS Publication   Hepatitis A02/11/2019 MSD VAQTA-ADULT [id number] IM RD 02/11/2019 07/20/2016   Comments: pt tolerated well and left office in stable condition, KATHI Renner   Hepatitis A05/02/2018 SKB HAVRIX-ADULT  IM LD 05/02/2018 07/20/2016   Comments: patient tolerated well, left office in stable condition   Yvgvltghw37/03/2019 SKB Fluzone Quadrivalent [id number] IM RD 10/03/2019    Comments: pt tolerated well and left office in stable condition, Kathi Nathan   TdapRefused 08/03/2018 NE Not Entered  NE NE     Comments: pt has allergy to vaccine         Review of Systems  · Constitutional  o Denies  o : fever, fatigue, weight loss, weight gain  · Cardiovascular  o Denies  o : lower extremity edema, claudication, chest pressure, palpitations  · Respiratory  o Denies  o : shortness of breath, wheezing, frequent cough, hemoptysis, dyspnea on exertion  · Gastrointestinal  o Admits  o : vomiting  o Denies  o : nausea, diarrhea, constipation, abdominal pain      Vitals  Date Time BP Position Site L\R Cuff Size HR RR TEMP (F) WT  HT  BMI kg/m2 BSA m2 O2 Sat FR L/min FiO2 HC       12/13/2019 09:02 /82 Sitting    108 - R  97.4 289lbs 4oz 5'  4\" 49.65 2.43 96 %  21%    01/23/2020 08:17 /70 Sitting    114 - R  97.2 277lbs 2oz 5'  4\" 47.57 2.38 95 %  21%    09/25/2020 02:14 /68 Sitting    109 - R  97.9 286lbs 0oz 5'  4\" 49.09 2.42 98 %  21%          Physical Examination  · Constitutional  o Appearance  o : morbidly obese, well developed  · Head and Face  o Head  o :   § Inspection  § : atraumatic, normocephalic  · Eyes  o Eyes  o : extraocular movements intact, no scleral icterus, no conjunctival injection  · Ears, Nose, Mouth and Throat  o Ears  o :   § External Ears  § : normal  § Otoscopic Examination  § : Bilaterally with effusions  o Nose  o :   § Intranasal Exam  § : nares patent  o Oral Cavity  o :   § Oral " Mucosa  § : moist mucous membranes  · Respiratory  o Respiratory Effort  o : breathing comfortably, symmetric chest rise  o Auscultation of Lungs  o : clear to asculatation bilaterally, no wheezes, rales, or rhonchii  · Cardiovascular  o Heart  o :   § Auscultation of Heart  § : regular rate and rhythm, no murmurs, rubs, or gallops  o Peripheral Vascular System  o :   § Extremities  § : no edema  · Neurologic  o Mental Status Examination  o :   § Orientation  § : grossly oriented to person, place and time  o Gait and Station  o :   § Gait Screening  § : normal gait  · Psychiatric  o General  o : normal mood and affect          Assessment  · Graves disease     242.00/E05.00  · Hyperlipidemia     272.4/E78.5  Will check labs and adjust meds accordingly  · Anxiety     300.02/F41.1  · Asthma     493.90/J45.909  · Morbid Obesity     278.01/E66.01  · Vitamin D Deficiency     268.9/E55.9  · Screening for depression     V79.0/Z13.89  · Vertigo     780.4/R42  · Hypercalcemia     275.42/E83.52       Start Allegra to see if allergies could be contributing to her vertigo type symptoms  Will check labs to look for other etiologies  Otherwise continue current medications     Problems Reconciled  Plan  · Orders  o Vitamin D Level (21005) - 268.9/E55.9 - 09/25/2020  o ACO-18: Negative screen for clinical depression using a standardized tool () - V79.0/Z13.89 - 09/25/2020  o CBC with Auto Diff [facility] (01082) - 300.02/F41.1, 493.90/J45.909, 272.4/E78.5, 242.00/E05.00 - 09/25/2020  o CMP [facility] (70232) - 300.02/F41.1, 493.90/J45.909, 272.4/E78.5, 242.00/E05.00 - 09/25/2020  o Thyroid Profile (44864, 44091, THYII) - 242.00/E05.00 - 09/25/2020  o ACO-39: Current medications updated and reviewed (1159F, ) - - 09/25/2020  o ACO-18: Negative screen for clinical depression using a standardized tool () - - 09/25/2020  o Cortisol afternoon (57878) - 278.01/E66.01 - 09/25/2020  o PTH (82354) - 275.42/E83.52 -  09/25/2020  · Medications  o Allegra Allergy 180 mg oral tablet   SIG: take 1 tablet (180 mg) by oral route once daily   DISP: (90) tablets with 0 refills  Prescribed on 09/25/2020     o Medications have been Reconciled  o Transition of Care or Provider Policy  · Instructions  o Advised that cheeses and other sources of dairy fats, animal fats, fast food, and the extras (candy, pastries, pies, doughnuts and cookies) all contain LDL raising nutrients. Advised to increase fruits, vegetables, whole grains, and to monitor portion sizes.   o Depression Screen completed and scanned into the EMR under the designated folder within the patient's documents.  o Today's PHQ-9 result is _0__  o Patient was educated/instructed on their diagnosis, treatment and medications prior to discharge from the clinic today.            Electronically Signed by: Beena López MD -Author on October 18, 2020 12:12:58 PM

## 2021-05-13 NOTE — PROGRESS NOTES
Progress Note      Patient Name: Beena Osborne   Patient ID: [id number]   Sex: Female   YOB: 1980    Primary Care Provider: Beena López MD   Referring Provider: Beena López MD    Visit Date: June 26, 2020    Provider: MIC Fritz   Location: [facility]   Location Address: [address] [place] [address]  [id number]   Location Phone: [phone]          Chief Complaint  · F/U on asthma  · F/U anxiety      History Of Present Illness  Video Conferencing Visit  Beena Osborne is a 39 year old /White female who is presenting for evaluation via video conferencing via Zoom. Verbal consent obtained before beginning visit.   The following staff were present during this visit: MIC Viveros   Beena Osborne is a 39 year old /White female who presents for evaluation and treatment of:      2-month follow-up    Chronic illness    Hypertensioncontinues losartan is checking her blood pressure regularly usually running in the 130s over 80s.  Denies chest pain, shortness of breath, edema, headache at this time.    Vitamin D deficiencypatient did not get a refill on vitamin D to start taking it twice a week so she is to continue just taking the once a week.    Allergies and asthmapatient continues taking her nasal spray, Singulair and Claritin she does have a slight cough but the albuterol helps when she has that.  Has only had to take the albuterol twice in the past month.  Patient wishes not to try shots at this time due to fear of needles.    Hypothyroidismcontinue Synthroid 200 mcg and feels that her thyroid is well controlled her bowel movements are normal no hair loss and just mild fatigue which she feels is related to her allergies.    Did have a telehealth visit with her rheumatologist Meron López which went well they prescribed prednisone 10 mg to take during a flare and has only had to take that twice in the past  month.  Has another appointment with her in the next month.    Patient voices no other concerns at this time and feels she is doing well.       Past Medical History  Disease Name Date Onset Notes   Allergic rhinitis 07/10/2015 Will add Flonase 2 cetirizine   Anemia --  --    Anxiety --  --    Asthma --  --    B12 deficiency --  --    Depression --  --    Difficulty concentrating 07/15/2016 will try straterra   Graves disease 07/10/2015 --    Hyperlipidemia 07/10/2015 Will check labs and adjust meds accordingly   Impaired fasting glucose --  --    Morbid Obesity --  --    PCOS (polycystic ovarian syndrome) --  --    Primary insomnia 10/25/2015 will refer for sleep study   Psoriasis 07/10/2015 Seems to be improving I believe she was on steroids at the hospital which is no longer on I do not want her to be on steroids further until her blood pressure is well controlled   RA (rheumatoid arthritis) --  --    Secondary hypothyroidism 07/10/2015 --    Vitamin D Deficiency --  --          Past Surgical History  Procedure Name Date Notes   Appendectomy 1984 --    Cyst Excision --  neck   Incision and drainage abscess --  neck abscess   Ovarian cystectomy 1999 --    Septoplasty 2012 --    Sinus Surgery 2012 --          Medication List  Name Date Started Instructions   Accu-Chek Maddie Plus test strp miscellaneous strip 12/16/2019 use as directed test bid prn   Berberine  Take 1 tablet by mouth twice a day   Cinnamon 500 mg oral capsule  --    folic acid 800 mcg oral tablet  take 1 tablet (0.8 mg) by oral route once daily   Iodine  300 mcg once daily   Larissia 0.1-20 mg-mcg oral tablet  take 1 tablet by oral route once daily   losartan 50 mg oral tablet 06/26/2020 TAKE 1 TABLET (50 MG) BY ORAL ROUTE ONCE DAILY for 90 days   prednisone 10 mg oral tablet  taking 5 mg as needed   QNASL 80 mcg/actuation nasal HFA aerosol inhaler 12/19/2019 spray 2 sprays (160 mcg) in each nostril by intranasal route once daily   Selenium  100 mcg  once daily   Singulair 10 mg oral tablet 11/07/2019 TAKE 1 TABLET (10 MG) BY ORAL ROUTE ONCE DAILY IN THE EVENING FOR 90 DAYS   Synthroid 200 mcg oral tablet 06/26/2020 TAKE 1 TABLET DAILY for 90 days   Ventolin HFA 90 mcg/actuation inhalation HFA aerosol inhaler 10/03/2019 inhale 1 - 2 puffs (90 - 180 mcg) by inhalation route every 6 hours as needed   Vitamin D2 1,250 mcg (50,000 unit) oral capsule 06/26/2020 take 1 capsule by oral route twice a week for 90 days         Allergy List  Allergen Name Date Reaction Notes   amlodipine --  severe diarrhea --    Demerol --  hallucinations --    TETANUS --  vomiting and fever --    Zofran --  fever --          Family Medical History  Disease Name Relative/Age Notes   Stomach Neoplasm, Malignant Grandmother (maternal)/   --    Brain Neoplasm, Malignant Grandfather (maternal)/   --    Lung cancer Grandfather (paternal)/   --    Testicular Cancer Father/   --          Social History  Finding Status Start/Stop Quantity Notes   Tobacco Never --/-- --  --          Immunizations  NameDate Admin Mfg Trade Name Lot Number Route Inj VIS Given VIS Publication   Hepatitis A02/11/2019 MSD VAQTA-ADULT [id number] IM RD 02/11/2019 07/20/2016   Comments: pt tolerated well and left office in stable condition, KATHI Renner   Hepatitis A05/02/2018 SKB HAVRIX-ADULT  IM LD 05/02/2018 07/20/2016   Comments: patient tolerated well, left office in stable condition   Mkmcydtks62/03/2019 SKB Fluzone Quadrivalent [id number] IM RD 10/03/2019    Comments: pt tolerated well and left office in stable condition, Kathi Nathan   Aestrbffm73/13/2018 SKB Fluzone Quadrivalent [id number] IM RD 11/13/2018 08/07/2015   Comments: pt tolerated well and left office in stable condition, KATHI Nathan   Qtjzzfcbr67/21/2016 SKB Fluarix, quadrivalent, preservative free [id number] IM RD 10/21/2016 08/19/2014   Comments: Patient tolerated well, left office in stable condition.   Mpgvkzlno08/07/2015 SKB Fluarix, quadrivalent,  preservative free [id number] IM  10/07/2015 08/19/2014   Comments: pt tolerated well. Left office in stable condition.   TdapRefused 08/03/2018 NE Not Entered  NE NE     Comments: pt has allergy to vaccine         Review of Systems  · Constitutional  o Admits  o : fatigue  o Denies  o : fever, weight loss, weight gain  · HENT  o Admits  o : nasal congestion  o Denies  o : headaches, vertigo, recent head injury, sinus pain, nasal discharge, ear pain, ear fullness  · Cardiovascular  o Denies  o : lower extremity edema, claudication, chest pressure, palpitations  · Respiratory  o Denies  o : shortness of breath, wheezing, cough, hemoptysis, dyspnea on exertion  · Gastrointestinal  o Denies  o : nausea, vomiting, diarrhea, constipation, abdominal pain  · Endocrine  o Denies  o : polyuria, loss of hair, constipation, cold intolerance, weight gain, weight loss  · Allergic-Immunologic  o Admits  o : sinus allergy symptoms  o Denies  o : frequent illnesses      Physical Examination     Gen: well-nourished, no acute distress  HENT: atraumatic, normocephalic  Eyes: extraocular movements intact, no scleral icterus  Lung: breathing comfortably, no cough  Skin: no visible rash, no lesions  Neuro: grossly oriented to person, place, and time. no facial droop   Psych: normal mood and affect             Assessment  · Allergic rhinitis     477.9/J30.9  Patient continues to take nasal spray, Singulair and Claritin. Discussed allergy injections if these continue to bother her. Patient wishes to not do this due to fear of needles.  · Graves disease     242.00/E05.00  Labs have been ordered, patient plans to get these done in the next week. Will adjust medication if needed.  · Hyperlipidemia     272.4/E78.5  Will check labs and adjust meds accordingly  · Anemia     285.9/D64.9  Labs ordered at this time including a B12 and folate.  · Anxiety     300.02/F41.1  Well-controlled at this time patient voices no  concerns.  · Asthma     493.90/J45.909  Has rescue inhaler available if needed. Appears to be well controlled.  · Morbid Obesity     278.01/E66.01  · Vitamin D Deficiency     268.9/E55.9  Refilled medication for twice a week vitamin D educated patient to take this we will recheck labs in 3 months.  · RA (rheumatoid arthritis)     714.0/M06.9  Follow-up with Samina Urbano her rheumatologist in 1 month.  · PCOS (polycystic ovarian syndrome)     256.4/E28.2    Problems Reconciled  Plan  · Orders  o B12 Folate levels (B12FO) - 285.9/D64.9 - 06/26/2020  o CBC with Auto Diff [facility] ([id number]) - 285.9/D64.9, 493.90/J45.909 - 06/26/2020  o CMP [facility] ([id number]) - 242.00/E05.00, 272.4/E78.5, 493.90/J45.909 - 06/26/2020  o Lipid Panel [facility] ([id number]) - 272.4/E78.5 - 06/26/2020  o ACO-39: Current medications updated and reviewed () - - 06/26/2020  · Medications  o Medications have been Reconciled  o Transition of Care or Provider Policy  · Instructions  o Advised that cheeses and other sources of dairy fats, animal fats, fast food, and the extras (candy, pastries, pies, doughnuts and cookies) all contain LDL raising nutrients. Advised to increase fruits, vegetables, whole grains, and to monitor portion sizes.   o Recheck Vitamin D blood level in 8-12 weeks.  o Patient was educated/instructed on their diagnosis, treatment and medications prior to discharge from the clinic today.  o Call the office with any concerns or questions.  · Disposition  o Call or Return if symptoms worsen or persist.  o Meds sent to pharmacy  o 3 month follow up            Electronically Signed by: MIC Fritz -Author on June 26, 2020 09:28:16 AM

## 2021-05-14 VITALS
BODY MASS INDEX: 48.83 KG/M2 | DIASTOLIC BLOOD PRESSURE: 68 MMHG | HEART RATE: 109 BPM | HEIGHT: 64 IN | TEMPERATURE: 97.9 F | WEIGHT: 286 LBS | OXYGEN SATURATION: 98 % | SYSTOLIC BLOOD PRESSURE: 130 MMHG

## 2021-05-14 VITALS — RESPIRATION RATE: 15 BRPM | BODY MASS INDEX: 51.91 KG/M2 | HEIGHT: 63 IN | WEIGHT: 293 LBS

## 2021-05-14 VITALS
DIASTOLIC BLOOD PRESSURE: 84 MMHG | WEIGHT: 293 LBS | HEART RATE: 102 BPM | BODY MASS INDEX: 50.02 KG/M2 | SYSTOLIC BLOOD PRESSURE: 134 MMHG | HEIGHT: 64 IN

## 2021-05-14 VITALS — WEIGHT: 287 LBS | BODY MASS INDEX: 49 KG/M2 | HEIGHT: 64 IN | RESPIRATION RATE: 18 BRPM

## 2021-05-15 VITALS
SYSTOLIC BLOOD PRESSURE: 120 MMHG | OXYGEN SATURATION: 95 % | HEIGHT: 64 IN | BODY MASS INDEX: 47.31 KG/M2 | TEMPERATURE: 97.2 F | HEART RATE: 114 BPM | WEIGHT: 277.12 LBS | DIASTOLIC BLOOD PRESSURE: 70 MMHG

## 2021-05-15 VITALS
HEART RATE: 94 BPM | SYSTOLIC BLOOD PRESSURE: 124 MMHG | WEIGHT: 293 LBS | TEMPERATURE: 97.6 F | BODY MASS INDEX: 50.02 KG/M2 | DIASTOLIC BLOOD PRESSURE: 88 MMHG | RESPIRATION RATE: 16 BRPM | OXYGEN SATURATION: 96 % | HEIGHT: 64 IN

## 2021-05-15 VITALS
DIASTOLIC BLOOD PRESSURE: 84 MMHG | HEART RATE: 110 BPM | BODY MASS INDEX: 50.02 KG/M2 | HEIGHT: 64 IN | RESPIRATION RATE: 16 BRPM | OXYGEN SATURATION: 95 % | WEIGHT: 293 LBS | TEMPERATURE: 97.6 F | SYSTOLIC BLOOD PRESSURE: 124 MMHG

## 2021-05-15 VITALS
BODY MASS INDEX: 50.02 KG/M2 | WEIGHT: 293 LBS | HEIGHT: 64 IN | HEART RATE: 98 BPM | TEMPERATURE: 97 F | SYSTOLIC BLOOD PRESSURE: 128 MMHG | OXYGEN SATURATION: 94 % | DIASTOLIC BLOOD PRESSURE: 82 MMHG

## 2021-05-15 VITALS
BODY MASS INDEX: 49.38 KG/M2 | DIASTOLIC BLOOD PRESSURE: 82 MMHG | HEART RATE: 108 BPM | WEIGHT: 289.25 LBS | HEIGHT: 64 IN | OXYGEN SATURATION: 96 % | TEMPERATURE: 97.4 F | SYSTOLIC BLOOD PRESSURE: 122 MMHG

## 2021-05-15 VITALS
OXYGEN SATURATION: 94 % | TEMPERATURE: 98.2 F | SYSTOLIC BLOOD PRESSURE: 118 MMHG | HEART RATE: 104 BPM | RESPIRATION RATE: 16 BRPM | DIASTOLIC BLOOD PRESSURE: 70 MMHG | WEIGHT: 293 LBS

## 2021-05-15 VITALS
HEIGHT: 64 IN | RESPIRATION RATE: 20 BRPM | TEMPERATURE: 97.9 F | WEIGHT: 293 LBS | HEART RATE: 107 BPM | BODY MASS INDEX: 50.02 KG/M2 | OXYGEN SATURATION: 99 % | SYSTOLIC BLOOD PRESSURE: 138 MMHG | DIASTOLIC BLOOD PRESSURE: 88 MMHG

## 2021-05-16 VITALS
DIASTOLIC BLOOD PRESSURE: 70 MMHG | WEIGHT: 293 LBS | HEIGHT: 64 IN | HEART RATE: 107 BPM | SYSTOLIC BLOOD PRESSURE: 118 MMHG | RESPIRATION RATE: 18 BRPM | BODY MASS INDEX: 50.02 KG/M2 | OXYGEN SATURATION: 95 % | TEMPERATURE: 97.5 F

## 2021-05-16 VITALS
BODY MASS INDEX: 50.02 KG/M2 | RESPIRATION RATE: 16 BRPM | HEIGHT: 64 IN | OXYGEN SATURATION: 97 % | SYSTOLIC BLOOD PRESSURE: 134 MMHG | HEART RATE: 116 BPM | WEIGHT: 293 LBS | TEMPERATURE: 97.5 F | DIASTOLIC BLOOD PRESSURE: 78 MMHG

## 2021-05-16 VITALS
TEMPERATURE: 97 F | RESPIRATION RATE: 16 BRPM | DIASTOLIC BLOOD PRESSURE: 62 MMHG | HEIGHT: 64 IN | BODY MASS INDEX: 50.02 KG/M2 | SYSTOLIC BLOOD PRESSURE: 128 MMHG | OXYGEN SATURATION: 96 % | WEIGHT: 293 LBS | HEART RATE: 104 BPM

## 2021-05-16 VITALS
SYSTOLIC BLOOD PRESSURE: 124 MMHG | WEIGHT: 293 LBS | DIASTOLIC BLOOD PRESSURE: 72 MMHG | BODY MASS INDEX: 50.02 KG/M2 | TEMPERATURE: 96.9 F | HEIGHT: 64 IN | OXYGEN SATURATION: 96 % | RESPIRATION RATE: 18 BRPM | HEART RATE: 116 BPM

## 2021-05-16 VITALS — RESPIRATION RATE: 16 BRPM | HEIGHT: 64 IN | WEIGHT: 293 LBS | BODY MASS INDEX: 50.02 KG/M2

## 2021-05-16 VITALS
DIASTOLIC BLOOD PRESSURE: 80 MMHG | HEIGHT: 64 IN | RESPIRATION RATE: 18 BRPM | TEMPERATURE: 97.6 F | WEIGHT: 293 LBS | SYSTOLIC BLOOD PRESSURE: 118 MMHG | BODY MASS INDEX: 50.02 KG/M2 | HEART RATE: 96 BPM | OXYGEN SATURATION: 96 %

## 2021-05-22 ENCOUNTER — TRANSCRIBE ORDERS (OUTPATIENT)
Dept: CARDIOLOGY | Facility: CLINIC | Age: 41
End: 2021-05-22

## 2021-05-22 DIAGNOSIS — R00.2 PALPITATIONS: Primary | ICD-10-CM

## 2021-06-01 ENCOUNTER — TRANSCRIBE ORDERS (OUTPATIENT)
Dept: ADMINISTRATIVE | Facility: HOSPITAL | Age: 41
End: 2021-06-01

## 2021-06-01 DIAGNOSIS — R79.89 ELEVATED LFTS: Primary | ICD-10-CM

## 2021-06-01 DIAGNOSIS — E11.9 TYPE 2 DIABETES MELLITUS WITHOUT COMPLICATION, UNSPECIFIED WHETHER LONG TERM INSULIN USE (HCC): ICD-10-CM

## 2021-06-07 ENCOUNTER — TELEPHONE (OUTPATIENT)
Dept: FAMILY MEDICINE CLINIC | Facility: CLINIC | Age: 41
End: 2021-06-07

## 2021-06-07 NOTE — TELEPHONE ENCOUNTER
Caller: Beena Osborne    Relationship: Self    Best call back number: [phone]     Medication needed:   Requested Prescriptions      No prescriptions requested or ordered in this encounter   NPTHYROID 120 MG    When do you need the refill by: ASAP    What additional details did the patient provide when requesting the medication: PATIENT HAS NEW PATIENT APPT ON 6/28, NEEDS TO THE RX BEFORE THE APPT.  THANK YOU.    Does the patient have less than a 3 day supply:  [x] Yes  [] No    What is the patient's preferred pharmacy: [facility], [address] - [phone]  - [phone] FX

## 2021-06-09 RX ORDER — LEVOTHYROXINE AND LIOTHYRONINE 76; 18 UG/1; UG/1
120 TABLET ORAL DAILY
Qty: 30 TABLET | Refills: 1 | Status: SHIPPED | OUTPATIENT
Start: 2021-06-09 | End: 2021-06-28 | Stop reason: SDUPTHER

## 2021-06-09 RX ORDER — LEVOTHYROXINE AND LIOTHYRONINE 76; 18 UG/1; UG/1
120 TABLET ORAL DAILY
Qty: 30 TABLET | Refills: 1 | Status: SHIPPED | OUTPATIENT
Start: 2021-06-09 | End: 2021-06-09 | Stop reason: SDUPTHER

## 2021-06-09 RX ORDER — LEVOTHYROXINE AND LIOTHYRONINE 76; 18 UG/1; UG/1
TABLET ORAL
COMMUNITY
Start: 2021-03-08 | End: 2021-06-09 | Stop reason: SDUPTHER

## 2021-06-09 NOTE — TELEPHONE ENCOUNTER
Please schedule patient appt   Appt needs to be made for f/u  With patient  Please     I will create new task for med refill thank you

## 2021-06-09 NOTE — TELEPHONE ENCOUNTER
Patient needs a refill for synthroid meds and has been seeing a different provider in [place] MIC Dinh.  The last time you had seen this patient was 9/2020 and last labs were 9/25/2020. Please advise if you want to fill medication.  I have ordered the current med and dose she was on.

## 2021-06-23 PROBLEM — M06.9 RA (RHEUMATOID ARTHRITIS): Status: ACTIVE | Noted: 2021-06-23

## 2021-06-23 PROBLEM — R73.01 IMPAIRED FASTING GLUCOSE: Status: ACTIVE | Noted: 2021-06-23

## 2021-06-23 PROBLEM — J45.909 ASTHMA: Status: ACTIVE | Noted: 2021-06-23

## 2021-06-23 PROBLEM — F41.9 ANXIETY: Status: ACTIVE | Noted: 2021-06-23

## 2021-06-23 PROBLEM — E53.8 B12 DEFICIENCY: Status: ACTIVE | Noted: 2021-06-23

## 2021-06-23 PROBLEM — D64.9 ANEMIA: Status: ACTIVE | Noted: 2021-06-23

## 2021-06-23 PROBLEM — F32.A DEPRESSION: Status: ACTIVE | Noted: 2021-06-23

## 2021-06-23 PROBLEM — E66.01 MORBID OBESITY: Status: ACTIVE | Noted: 2021-06-23

## 2021-06-23 PROBLEM — E55.9 VITAMIN D DEFICIENCY: Status: ACTIVE | Noted: 2021-06-23

## 2021-06-23 PROBLEM — E28.2 POLYCYSTIC OVARIES: Status: ACTIVE | Noted: 2021-06-23

## 2021-06-23 RX ORDER — LEVOTHYROXINE SODIUM 175 UG/1
TABLET ORAL
COMMUNITY
End: 2021-06-28

## 2021-06-28 ENCOUNTER — OFFICE VISIT (OUTPATIENT)
Dept: FAMILY MEDICINE CLINIC | Facility: CLINIC | Age: 41
End: 2021-06-28

## 2021-06-28 ENCOUNTER — HOSPITAL ENCOUNTER (OUTPATIENT)
Dept: ULTRASOUND IMAGING | Facility: HOSPITAL | Age: 41
Discharge: HOME | End: 2021-06-28
Admitting: INTERNAL MEDICINE

## 2021-06-28 VITALS
OXYGEN SATURATION: 99 % | HEIGHT: 64 IN | SYSTOLIC BLOOD PRESSURE: 140 MMHG | HEART RATE: 105 BPM | DIASTOLIC BLOOD PRESSURE: 82 MMHG | WEIGHT: 293 LBS | BODY MASS INDEX: 50.02 KG/M2 | TEMPERATURE: 97.5 F

## 2021-06-28 DIAGNOSIS — R79.89 ELEVATED LFTS: ICD-10-CM

## 2021-06-28 DIAGNOSIS — I10 ESSENTIAL HYPERTENSION: ICD-10-CM

## 2021-06-28 DIAGNOSIS — E55.9 VITAMIN D DEFICIENCY: ICD-10-CM

## 2021-06-28 DIAGNOSIS — L40.50 PSORIATIC ARTHRITIS (HCC): ICD-10-CM

## 2021-06-28 DIAGNOSIS — E11.9 TYPE 2 DIABETES MELLITUS WITHOUT COMPLICATION, UNSPECIFIED WHETHER LONG TERM INSULIN USE (HCC): ICD-10-CM

## 2021-06-28 DIAGNOSIS — R25.2 LEG CRAMPS: ICD-10-CM

## 2021-06-28 DIAGNOSIS — D64.9 ANEMIA, UNSPECIFIED TYPE: ICD-10-CM

## 2021-06-28 DIAGNOSIS — R53.83 FATIGUE, UNSPECIFIED TYPE: Primary | ICD-10-CM

## 2021-06-28 DIAGNOSIS — M06.9 RHEUMATOID ARTHRITIS, INVOLVING UNSPECIFIED SITE, UNSPECIFIED WHETHER RHEUMATOID FACTOR PRESENT (HCC): ICD-10-CM

## 2021-06-28 DIAGNOSIS — E03.8 SECONDARY HYPOTHYROIDISM: ICD-10-CM

## 2021-06-28 DIAGNOSIS — J45.909 UNCOMPLICATED ASTHMA, UNSPECIFIED ASTHMA SEVERITY, UNSPECIFIED WHETHER PERSISTENT: ICD-10-CM

## 2021-06-28 DIAGNOSIS — R73.01 IMPAIRED FASTING GLUCOSE: ICD-10-CM

## 2021-06-28 PROCEDURE — 99204 OFFICE O/P NEW MOD 45 MIN: CPT | Performed by: NURSE PRACTITIONER

## 2021-06-28 PROCEDURE — 76705 ECHO EXAM OF ABDOMEN: CPT

## 2021-06-28 RX ORDER — ALBUTEROL SULFATE 90 UG/1
2 AEROSOL, METERED RESPIRATORY (INHALATION) EVERY 4 HOURS PRN
COMMUNITY
End: 2022-10-17 | Stop reason: SDUPTHER

## 2021-06-28 RX ORDER — METOPROLOL SUCCINATE 25 MG/1
25 TABLET, EXTENDED RELEASE ORAL DAILY
COMMUNITY
End: 2021-06-28 | Stop reason: SDUPTHER

## 2021-06-28 RX ORDER — SELENIUM SULFIDE 2.5 MG/100ML
LOTION TOPICAL DAILY PRN
COMMUNITY
End: 2021-06-28

## 2021-06-28 RX ORDER — SULFAMETHOXAZOLE AND TRIMETHOPRIM 800; 160 MG/1; MG/1
1 TABLET ORAL 2 TIMES DAILY
COMMUNITY
End: 2021-06-28

## 2021-06-28 RX ORDER — DEXAMETHASONE 1 MG
1 TABLET ORAL 2 TIMES DAILY WITH MEALS
COMMUNITY
End: 2021-06-28

## 2021-06-28 RX ORDER — METOPROLOL SUCCINATE 25 MG/1
25 TABLET, EXTENDED RELEASE ORAL DAILY
Qty: 90 TABLET | Refills: 1 | Status: SHIPPED | OUTPATIENT
Start: 2021-06-28 | End: 2021-10-07

## 2021-06-28 RX ORDER — LANOLIN ALCOHOL/MO/W.PET/CERES
400 CREAM (GRAM) TOPICAL DAILY
COMMUNITY
End: 2021-11-24 | Stop reason: SDUPTHER

## 2021-06-28 RX ORDER — MONTELUKAST SODIUM 10 MG/1
10 TABLET ORAL NIGHTLY
COMMUNITY
End: 2022-03-01 | Stop reason: SDUPTHER

## 2021-06-28 RX ORDER — BECLOMETHASONE DIPROPIONATE 80 UG/1
AEROSOL, METERED NASAL
COMMUNITY
End: 2021-10-07

## 2021-06-28 RX ORDER — AMINOCAPROIC ACID 500 MG/1
TABLET ORAL EVERY 6 HOURS
COMMUNITY
End: 2021-06-28

## 2021-06-28 RX ORDER — LOSARTAN POTASSIUM 50 MG/1
50 TABLET ORAL DAILY
Qty: 90 TABLET | Refills: 1 | Status: SHIPPED | OUTPATIENT
Start: 2021-06-28 | End: 2021-11-23 | Stop reason: SDUPTHER

## 2021-06-28 RX ORDER — LEVONORGESTREL AND ETHINYL ESTRADIOL 0.1-0.02MG
1 KIT ORAL DAILY
COMMUNITY
End: 2021-06-28 | Stop reason: SDUPTHER

## 2021-06-28 RX ORDER — LEVONORGESTREL AND ETHINYL ESTRADIOL 0.1-0.02MG
1 KIT ORAL DAILY
Qty: 84 TABLET | Refills: 0 | Status: SHIPPED | OUTPATIENT
Start: 2021-06-28 | End: 2021-11-23

## 2021-06-28 RX ORDER — LEVOTHYROXINE AND LIOTHYRONINE 76; 18 UG/1; UG/1
120 TABLET ORAL DAILY
Qty: 90 TABLET | Refills: 1 | Status: SHIPPED | OUTPATIENT
Start: 2021-06-28 | End: 2021-07-01 | Stop reason: SDUPTHER

## 2021-06-28 RX ORDER — QUERCETIN DIHYDRATE 100 %
500 POWDER (GRAM) MISCELLANEOUS DAILY
COMMUNITY
End: 2021-11-23

## 2021-06-28 RX ORDER — LOSARTAN POTASSIUM 50 MG/1
50 TABLET ORAL DAILY
COMMUNITY
End: 2021-06-28 | Stop reason: SDUPTHER

## 2021-06-28 NOTE — PROGRESS NOTES
"Chief Complaint  new pt    Subjective          Beena Osborne presents to [facility]  History of Present Illness  She is here today as a new patient to establish care.  She states her previous provider was Dr. López and then she changed to a different provider but that provider is no longer in her network so she needed another provider.    She states she has a history of rheumatoid arthritis with Psoriatic arthritis.  She follows with Dr. DEX Urbano at [facility].  She is currently on Cosentyx monthly injection as well as other meds and supplements.    She is complaining of chronic fatigue and leg cramps and she wants multiple labs drawn.  She is brought a list of labs she wants obtained.    Hypertension: Blood pressure stable 140/82 on losartan 50 mg and on metoprolol 25 mg daily.    She has a history of vitamin D deficiency and currently takes vitamin D supplement.    She has a history of borderline diabetes.  She also has a history of anemia.    She has a history of hypothyroidism secondary to Graves' disease.  She is currently on NP thyroid 120 mg daily.    She has a history of asthma that is well controlled on Singulair and she has an albuterol inhaler to use as needed.    She is requesting a refill on her oral contraceptive pills.  She has not had a Pap smear in a while but she does see a GYN in [place] and states she will call make an appointment.  Objective   Vital Signs:   /82   Pulse 105   Temp 97.5 °F (36.4 °C)   Ht 162.6 cm (64\")   Wt (!) 139 kg (306 lb 3.2 oz)   SpO2 99%   BMI 52.56 kg/m²     Physical Exam  Vitals reviewed.   Constitutional:       Appearance: Normal appearance. She is well-developed.   Neck:      Thyroid: No thyroid mass, thyromegaly or thyroid tenderness.   Cardiovascular:      Rate and Rhythm: Normal rate and regular rhythm.      Heart sounds: No murmur heard.   No friction rub. No gallop.    Pulmonary:      Effort: " Pulmonary effort is normal.      Breath sounds: Normal breath sounds. No wheezing or rhonchi.   Lymphadenopathy:      Cervical: No cervical adenopathy.   Skin:     General: Skin is warm and dry.   Neurological:      Mental Status: She is alert and oriented to person, place, and time.      Cranial Nerves: No cranial nerve deficit.   Psychiatric:         Mood and Affect: Mood and affect normal.         Behavior: Behavior normal.         Thought Content: Thought content normal. Thought content does not include homicidal or suicidal ideation.         Judgment: Judgment normal.        Result Review :                 Assessment and Plan    Diagnoses and all orders for this visit:    1. Fatigue, unspecified type (Primary)  -     Selenium Serum; Future  -     Zinc; Future  -     Cancel: Glutamic Acid Decarboxylase; Future  -     Cancel: Comprehensive Metabolic Panel  -     Cancel: CBC & Differential  -     Cancel: SARS-CoV-2 Antibodies (Roche); Future  -     Cancel: Glutamic Acid Decarboxylase  -     SARS-CoV-2 Antibodies (Roche); Future  -     CBC & Differential; Future  -     Comprehensive Metabolic Panel; Future  -     Glutamic Acid Decarboxylase; Future    2. Leg cramps  -     Selenium Serum; Future  -     Zinc; Future  -     Cancel: Magnesium; Future  -     Cancel: Glutamic Acid Decarboxylase; Future  -     Cancel: Comprehensive Metabolic Panel  -     Cancel: CBC & Differential  -     Cancel: Glutamic Acid Decarboxylase  -     CBC & Differential; Future  -     Comprehensive Metabolic Panel; Future  -     Glutamic Acid Decarboxylase; Future  -     Magnesium; Future    3. Impaired fasting glucose  -     Cancel: Hemoglobin A1c  -     Cancel: Lipid Panel  -     Hemoglobin A1c; Future  -     Lipid Panel; Future    4. Secondary hypothyroidism  -     Cancel: TSH+Free T4  -     T3, free; Future  -     T3, Reverse; Future  -     TSH+Free T4; Future    5. Rheumatoid arthritis, involving unspecified site, unspecified whether  rheumatoid factor present (CMS/[facility])  -     Selenium Serum; Future  -     Zinc; Future  -     Cancel: Glutamic Acid Decarboxylase; Future  -     Cancel: Glutamic Acid Decarboxylase  -     Glutamic Acid Decarboxylase; Future    6. Essential hypertension  -     Cancel: Lipid Panel  -     Lipid Panel; Future    7. Uncomplicated asthma, unspecified asthma severity, unspecified whether persistent  -     Selenium Serum; Future  -     Zinc; Future  -     Cancel: Glutamic Acid Decarboxylase; Future  -     Cancel: Glutamic Acid Decarboxylase  -     Glutamic Acid Decarboxylase; Future    8. Vitamin D deficiency  -     Cancel: Vitamin D 25 Hydroxy  -     Vitamin D 25 Hydroxy; Future    9. Anemia, unspecified type  -     Cancel: Vitamin B12 & Folate  -     Selenium Serum; Future  -     Zinc; Future  -     Cancel: Glutamic Acid Decarboxylase; Future  -     Cancel: CBC & Differential  -     Cancel: Glutamic Acid Decarboxylase  -     CBC & Differential; Future  -     Glutamic Acid Decarboxylase; Future  -     Vitamin B12 & Folate; Future    10. Psoriatic arthritis (CMS/[facility])  -     Selenium Serum; Future  -     Zinc; Future  -     Cancel: Glutamic Acid Decarboxylase; Future  -     Cancel: Glutamic Acid Decarboxylase  -     Glutamic Acid Decarboxylase; Future    Other orders  -     losartan (COZAAR) 50 MG tablet; Take 1 tablet by mouth Daily.  Dispense: 90 tablet; Refill: 1  -     metoprolol succinate XL (TOPROL-XL) 25 MG 24 hr tablet; Take 1 tablet by mouth Daily.  Dispense: 90 tablet; Refill: 1  -     levonorgestrel-ethinyl estradiol (Larissia) 0.1-20 MG-MCG per tablet; Take 1 tablet by mouth Daily.  Dispense: 84 tablet; Refill: 0  -     Thyroid (NP THYROID) 120 MG PO tablet; Take 1 tablet by mouth Daily.  Dispense: 90 tablet; Refill: 1        Follow Up   Return in about 6 months (around 12/28/2021) for Next scheduled follow up.  Patient was given instructions and counseling regarding her condition or for health maintenance advice.  Please see specific information pulled into the AVS if appropriate.

## 2021-07-01 ENCOUNTER — LAB (OUTPATIENT)
Dept: LAB | Facility: HOSPITAL | Age: 41
End: 2021-07-01

## 2021-07-01 DIAGNOSIS — R73.01 IMPAIRED FASTING GLUCOSE: ICD-10-CM

## 2021-07-01 DIAGNOSIS — M06.9 RHEUMATOID ARTHRITIS, INVOLVING UNSPECIFIED SITE, UNSPECIFIED WHETHER RHEUMATOID FACTOR PRESENT (HCC): ICD-10-CM

## 2021-07-01 DIAGNOSIS — E03.8 SECONDARY HYPOTHYROIDISM: ICD-10-CM

## 2021-07-01 DIAGNOSIS — R25.2 LEG CRAMPS: ICD-10-CM

## 2021-07-01 DIAGNOSIS — J45.909 UNCOMPLICATED ASTHMA, UNSPECIFIED ASTHMA SEVERITY, UNSPECIFIED WHETHER PERSISTENT: ICD-10-CM

## 2021-07-01 DIAGNOSIS — E55.9 VITAMIN D DEFICIENCY: ICD-10-CM

## 2021-07-01 DIAGNOSIS — D64.9 ANEMIA, UNSPECIFIED TYPE: ICD-10-CM

## 2021-07-01 DIAGNOSIS — I10 ESSENTIAL HYPERTENSION: ICD-10-CM

## 2021-07-01 DIAGNOSIS — R53.83 FATIGUE, UNSPECIFIED TYPE: ICD-10-CM

## 2021-07-01 DIAGNOSIS — L40.50 PSORIATIC ARTHRITIS (HCC): ICD-10-CM

## 2021-07-01 LAB
25(OH)D3 SERPL-MCNC: 30.2 NG/ML (ref 30–100)
ALBUMIN SERPL-MCNC: 4 G/DL (ref 3.5–5.2)
ALBUMIN/GLOB SERPL: 1.3 G/DL
ALP SERPL-CCNC: 57 U/L (ref 39–117)
ALT SERPL W P-5'-P-CCNC: 12 U/L (ref 1–33)
ANION GAP SERPL CALCULATED.3IONS-SCNC: 10.9 MMOL/L (ref 5–15)
AST SERPL-CCNC: 12 U/L (ref 1–32)
BASOPHILS # BLD AUTO: 0.12 10*3/MM3 (ref 0–0.2)
BASOPHILS NFR BLD AUTO: 0.9 % (ref 0–1.5)
BILIRUB SERPL-MCNC: 0.2 MG/DL (ref 0–1.2)
BUN SERPL-MCNC: 10 MG/DL (ref 6–20)
BUN/CREAT SERPL: 12.7 (ref 7–25)
CALCIUM SPEC-SCNC: 9.3 MG/DL (ref 8.6–10.5)
CHLORIDE SERPL-SCNC: 102 MMOL/L (ref 98–107)
CHOLEST SERPL-MCNC: 199 MG/DL (ref 0–200)
CO2 SERPL-SCNC: 24.1 MMOL/L (ref 22–29)
CREAT SERPL-MCNC: 0.79 MG/DL (ref 0.57–1)
DEPRECATED RDW RBC AUTO: 43.6 FL (ref 37–54)
EOSINOPHIL # BLD AUTO: 0.46 10*3/MM3 (ref 0–0.4)
EOSINOPHIL NFR BLD AUTO: 3.6 % (ref 0.3–6.2)
ERYTHROCYTE [DISTWIDTH] IN BLOOD BY AUTOMATED COUNT: 13.4 % (ref 12.3–15.4)
FOLATE SERPL-MCNC: 6.38 NG/ML (ref 4.78–24.2)
GFR SERPL CREATININE-BSD FRML MDRD: 81 ML/MIN/1.73
GLOBULIN UR ELPH-MCNC: 3.2 GM/DL
GLUCOSE SERPL-MCNC: 126 MG/DL (ref 65–99)
HBA1C MFR BLD: 7 % (ref 4.8–5.6)
HCT VFR BLD AUTO: 40.6 % (ref 34–46.6)
HDLC SERPL-MCNC: 57 MG/DL (ref 40–60)
HGB BLD-MCNC: 13.5 G/DL (ref 12–15.9)
IMM GRANULOCYTES # BLD AUTO: 0.23 10*3/MM3 (ref 0–0.05)
IMM GRANULOCYTES NFR BLD AUTO: 1.8 % (ref 0–0.5)
LDLC SERPL CALC-MCNC: 116 MG/DL (ref 0–100)
LDLC/HDLC SERPL: 1.97 {RATIO}
LYMPHOCYTES # BLD AUTO: 4.91 10*3/MM3 (ref 0.7–3.1)
LYMPHOCYTES NFR BLD AUTO: 38.7 % (ref 19.6–45.3)
MAGNESIUM SERPL-MCNC: 1.9 MG/DL (ref 1.6–2.6)
MCH RBC QN AUTO: 29.5 PG (ref 26.6–33)
MCHC RBC AUTO-ENTMCNC: 33.3 G/DL (ref 31.5–35.7)
MCV RBC AUTO: 88.8 FL (ref 79–97)
MONOCYTES # BLD AUTO: 0.61 10*3/MM3 (ref 0.1–0.9)
MONOCYTES NFR BLD AUTO: 4.8 % (ref 5–12)
NEUTROPHILS NFR BLD AUTO: 50.2 % (ref 42.7–76)
NEUTROPHILS NFR BLD AUTO: 6.36 10*3/MM3 (ref 1.7–7)
NRBC BLD AUTO-RTO: 0 /100 WBC (ref 0–0.2)
PLATELET # BLD AUTO: 451 10*3/MM3 (ref 140–450)
PMV BLD AUTO: 10.7 FL (ref 6–12)
POTASSIUM SERPL-SCNC: 4.2 MMOL/L (ref 3.5–5.2)
PROT SERPL-MCNC: 7.2 G/DL (ref 6–8.5)
RBC # BLD AUTO: 4.57 10*6/MM3 (ref 3.77–5.28)
SODIUM SERPL-SCNC: 137 MMOL/L (ref 136–145)
T3FREE SERPL-MCNC: 2.1 PG/ML (ref 2–4.4)
T4 FREE SERPL-MCNC: 0.57 NG/DL (ref 0.93–1.7)
TRIGL SERPL-MCNC: 148 MG/DL (ref 0–150)
TSH SERPL DL<=0.05 MIU/L-ACNC: 15 UIU/ML (ref 0.27–4.2)
VIT B12 BLD-MCNC: 528 PG/ML (ref 211–946)
VLDLC SERPL-MCNC: 26 MG/DL (ref 5–40)
WBC # BLD AUTO: 12.69 10*3/MM3 (ref 3.4–10.8)

## 2021-07-01 PROCEDURE — 83036 HEMOGLOBIN GLYCOSYLATED A1C: CPT

## 2021-07-01 PROCEDURE — 82607 VITAMIN B-12: CPT

## 2021-07-01 PROCEDURE — 36415 COLL VENOUS BLD VENIPUNCTURE: CPT

## 2021-07-01 PROCEDURE — 85025 COMPLETE CBC W/AUTO DIFF WBC: CPT

## 2021-07-01 PROCEDURE — 84439 ASSAY OF FREE THYROXINE: CPT

## 2021-07-01 PROCEDURE — 86769 SARS-COV-2 COVID-19 ANTIBODY: CPT

## 2021-07-01 PROCEDURE — 84630 ASSAY OF ZINC: CPT

## 2021-07-01 PROCEDURE — 82306 VITAMIN D 25 HYDROXY: CPT

## 2021-07-01 PROCEDURE — 83735 ASSAY OF MAGNESIUM: CPT

## 2021-07-01 PROCEDURE — 84482 T3 REVERSE: CPT

## 2021-07-01 PROCEDURE — 80061 LIPID PANEL: CPT

## 2021-07-01 PROCEDURE — 86341 ISLET CELL ANTIBODY: CPT

## 2021-07-01 PROCEDURE — 84443 ASSAY THYROID STIM HORMONE: CPT

## 2021-07-01 PROCEDURE — 82746 ASSAY OF FOLIC ACID SERUM: CPT

## 2021-07-01 PROCEDURE — 80053 COMPREHEN METABOLIC PANEL: CPT

## 2021-07-01 PROCEDURE — 84481 FREE ASSAY (FT-3): CPT

## 2021-07-01 PROCEDURE — 84255 ASSAY OF SELENIUM: CPT

## 2021-07-02 LAB — SARS-COV-2 AB SERPL QL IA: NEGATIVE

## 2021-07-02 RX ORDER — LEVOTHYROXINE AND LIOTHYRONINE 9.5; 2.25 UG/1; UG/1
15 TABLET ORAL DAILY
Qty: 30 TABLET | Refills: 1 | Status: SHIPPED | OUTPATIENT
Start: 2021-07-02 | End: 2021-10-07

## 2021-07-02 RX ORDER — LEVOTHYROXINE AND LIOTHYRONINE 76; 18 UG/1; UG/1
120 TABLET ORAL DAILY
Qty: 30 TABLET | Refills: 1 | Status: SHIPPED | OUTPATIENT
Start: 2021-07-02 | End: 2021-07-13

## 2021-07-03 LAB — ZINC SERPL-MCNC: 101 UG/DL (ref 44–115)

## 2021-07-06 LAB
GAD65 AB SER IA-ACNC: <5 U/ML (ref 0–5)
SELENIUM SERPL-MCNC: 142 UG/L (ref 93–198)
T3REVERSE SERPL-MCNC: 22.4 NG/DL (ref 9.2–24.1)

## 2021-07-08 DIAGNOSIS — R00.2 PALPITATIONS: Primary | ICD-10-CM

## 2021-07-12 ENCOUNTER — APPOINTMENT (OUTPATIENT)
Dept: CARDIOLOGY | Facility: HOSPITAL | Age: 41
End: 2021-07-12

## 2021-07-13 ENCOUNTER — TELEPHONE (OUTPATIENT)
Dept: FAMILY MEDICINE CLINIC | Facility: CLINIC | Age: 41
End: 2021-07-13

## 2021-07-13 RX ORDER — LEVOTHYROXINE, LIOTHYRONINE 76; 18 UG/1; UG/1
TABLET ORAL
Qty: 90 TABLET | Refills: 1 | Status: SHIPPED | OUTPATIENT
Start: 2021-07-13 | End: 2021-07-15

## 2021-07-13 NOTE — TELEPHONE ENCOUNTER
Caller: [facility] - [place], [place] - [address] - [phone] [facility] [phone] FX    Relationship: Pharmacy    Best call back number:     REFERENCE NUMBER: [phone]    What is the call regarding?: PHARMACY TECH, ADAL, CALLED ASKING IF THE PATIENT COULD HAVE THE ALTERNATIVE DOSAGE OF NP THYROID (60 MG) SINCE THE NP THYROID 120 MG IS UNAVAILABLE. PLEASE CALL AND ADVISE, THANK YOU.

## 2021-07-15 RX ORDER — THYROID 60 MG/1
120 TABLET ORAL DAILY
Qty: 60 TABLET | Refills: 2 | Status: SHIPPED | OUTPATIENT
Start: 2021-07-15 | End: 2021-10-07

## 2021-07-15 NOTE — TELEPHONE ENCOUNTER
THE PHARM SAID THAT THEY DISCON THE PRESCRIPTION BECAUSE IT HAD BEEN OVER TWO WEEKS. MED NEEDS TO BE RESENT TO PHARM

## 2021-07-26 ENCOUNTER — APPOINTMENT (OUTPATIENT)
Dept: NUCLEAR MEDICINE | Facility: HOSPITAL | Age: 41
End: 2021-07-26

## 2021-07-27 ENCOUNTER — APPOINTMENT (OUTPATIENT)
Dept: NUCLEAR MEDICINE | Facility: HOSPITAL | Age: 41
End: 2021-07-27

## 2021-07-29 ENCOUNTER — PREP FOR SURGERY (OUTPATIENT)
Dept: OTHER | Facility: HOSPITAL | Age: 41
End: 2021-07-29

## 2021-07-30 ENCOUNTER — PREP FOR SURGERY (OUTPATIENT)
Dept: OTHER | Facility: HOSPITAL | Age: 41
End: 2021-07-30

## 2021-08-12 RX ORDER — LEVOTHYROXINE AND LIOTHYRONINE 76; 18 UG/1; UG/1
120 TABLET ORAL DAILY
Qty: 30 TABLET | Refills: 1 | Status: SHIPPED | OUTPATIENT
Start: 2021-08-12 | End: 2021-11-23 | Stop reason: SDUPTHER

## 2021-10-07 ENCOUNTER — OFFICE VISIT (OUTPATIENT)
Dept: CARDIOLOGY | Facility: CLINIC | Age: 41
End: 2021-10-07

## 2021-10-07 VITALS
BODY MASS INDEX: 50.02 KG/M2 | HEIGHT: 64 IN | DIASTOLIC BLOOD PRESSURE: 90 MMHG | SYSTOLIC BLOOD PRESSURE: 136 MMHG | WEIGHT: 293 LBS | HEART RATE: 92 BPM

## 2021-10-07 DIAGNOSIS — E78.5 HYPERLIPIDEMIA LDL GOAL <100: ICD-10-CM

## 2021-10-07 DIAGNOSIS — R00.0 INAPPROPRIATE SINUS TACHYCARDIA: Primary | ICD-10-CM

## 2021-10-07 DIAGNOSIS — I10 ESSENTIAL HYPERTENSION: ICD-10-CM

## 2021-10-07 PROBLEM — I47.11 INAPPROPRIATE SINUS TACHYCARDIA: Status: ACTIVE | Noted: 2021-10-07

## 2021-10-07 PROCEDURE — 99214 OFFICE O/P EST MOD 30 MIN: CPT | Performed by: NURSE PRACTITIONER

## 2021-10-07 RX ORDER — PEN NEEDLE, DIABETIC 31 GX5/16"
NEEDLE, DISPOSABLE MISCELLANEOUS
COMMUNITY
Start: 2021-09-22 | End: 2022-03-01

## 2021-10-07 RX ORDER — METOPROLOL SUCCINATE 50 MG/1
50 TABLET, EXTENDED RELEASE ORAL DAILY
Qty: 90 TABLET | Refills: 3 | Status: SHIPPED | OUTPATIENT
Start: 2021-10-07 | End: 2022-03-01 | Stop reason: SDUPTHER

## 2021-10-07 RX ORDER — AZELASTINE 1 MG/ML
SPRAY, METERED NASAL
COMMUNITY
Start: 2021-04-20

## 2021-10-07 RX ORDER — CETIRIZINE HYDROCHLORIDE 10 MG/1
10 TABLET ORAL DAILY
COMMUNITY
End: 2022-03-01 | Stop reason: SDUPTHER

## 2021-10-07 RX ORDER — LEVONORGESTREL / ETHINYL ESTRADIOL AND ETHINYL ESTRADIOL 150-30(84)
KIT ORAL
COMMUNITY
Start: 2021-09-14 | End: 2021-11-23 | Stop reason: SDUPTHER

## 2021-10-07 RX ORDER — LIRAGLUTIDE 6 MG/ML
INJECTION SUBCUTANEOUS DAILY
COMMUNITY
Start: 2021-09-22 | End: 2021-11-23 | Stop reason: SINTOL

## 2021-10-07 RX ORDER — PEN NEEDLE, DIABETIC 31 GX5/16"
1 NEEDLE, DISPOSABLE MISCELLANEOUS DAILY
COMMUNITY
Start: 2021-09-22 | End: 2022-03-01

## 2021-10-07 RX ORDER — METOPROLOL SUCCINATE 25 MG/1
25 TABLET, EXTENDED RELEASE ORAL DAILY
Qty: 90 TABLET | Refills: 3 | Status: CANCELLED | OUTPATIENT
Start: 2021-10-07

## 2021-10-07 RX ORDER — PREDNISONE 1 MG/1
TABLET ORAL AS NEEDED
COMMUNITY
Start: 2021-08-09

## 2021-10-07 NOTE — PATIENT INSTRUCTIONS
"Low-Sodium Eating Plan  Sodium, which is an element that makes up salt, helps you maintain a healthy balance of fluids in your body. Too much sodium can increase your blood pressure and cause fluid and waste to be held in your body.  Your health care provider or dietitian may recommend following this plan if you have high blood pressure (hypertension), kidney disease, liver disease, or heart failure. Eating less sodium can help lower your blood pressure, reduce swelling, and protect your heart, liver, and kidneys.  What are tips for following this plan?  Reading food labels  · The Nutrition Facts label lists the amount of sodium in one serving of the food. If you eat more than one serving, you must multiply the listed amount of sodium by the number of servings.  · Choose foods with less than 140 mg of sodium per serving.  · Avoid foods with 300 mg of sodium or more per serving.  Shopping    · Look for lower-sodium products, often labeled as \"low-sodium\" or \"no salt added.\"  · Always check the sodium content, even if foods are labeled as \"unsalted\" or \"no salt added.\"  · Buy fresh foods.  ? Avoid canned foods and pre-made or frozen meals.  ? Avoid canned, cured, or processed meats.  · Buy breads that have less than 80 mg of sodium per slice.    Cooking    · Eat more home-cooked food and less restaurant, buffet, and fast food.  · Avoid adding salt when cooking. Use salt-free seasonings or herbs instead of table salt or sea salt. Check with your health care provider or pharmacist before using salt substitutes.  · Cook with plant-based oils, such as canola, sunflower, or olive oil.    Meal planning  · When eating at a restaurant, ask that your food be prepared with less salt or no salt, if possible. Avoid dishes labeled as brined, pickled, cured, smoked, or made with soy sauce, miso, or teriyaki sauce.  · Avoid foods that contain MSG (monosodium glutamate). MSG is sometimes added to Chinese food, bouillon, and some " "canned foods.  · Make meals that can be grilled, baked, poached, roasted, or steamed. These are generally made with less sodium.  General information  Most people on this plan should limit their sodium intake to 1,500-2,000 mg (milligrams) of sodium each day.  What foods should I eat?  Fruits  Fresh, frozen, or canned fruit. Fruit juice.  Vegetables  Fresh or frozen vegetables. \"No salt added\" canned vegetables. \"No salt added\" tomato sauce and paste. Low-sodium or reduced-sodium tomato and vegetable juice.  Grains  Low-sodium cereals, including oats, puffed wheat and rice, and shredded wheat. Low-sodium crackers. Unsalted rice. Unsalted pasta. Low-sodium bread. Whole-grain breads and whole-grain pasta.  Meats and other proteins  Fresh or frozen (no salt added) meat, poultry, seafood, and fish. Low-sodium canned tuna and salmon. Unsalted nuts. Dried peas, beans, and lentils without added salt. Unsalted canned beans. Eggs. Unsalted nut butters.  Dairy  Milk. Soy milk. Cheese that is naturally low in sodium, such as ricotta cheese, fresh mozzarella, or Swiss cheese. Low-sodium or reduced-sodium cheese. Cream cheese. Yogurt.  Seasonings and condiments  Fresh and dried herbs and spices. Salt-free seasonings. Low-sodium mustard and ketchup. Sodium-free salad dressing. Sodium-free light mayonnaise. Fresh or refrigerated horseradish. Lemon juice. Vinegar.  Other foods  Homemade, reduced-sodium, or low-sodium soups. Unsalted popcorn and pretzels. Low-salt or salt-free chips.  The items listed above may not be a complete list of foods and beverages you can eat. Contact a dietitian for more information.  What foods should I avoid?  Vegetables  Sauerkraut, pickled vegetables, and relishes. Olives. French fries. Onion rings. Regular canned vegetables (not low-sodium or reduced-sodium). Regular canned tomato sauce and paste (not low-sodium or reduced-sodium). Regular tomato and vegetable juice (not low-sodium or reduced-sodium). " Frozen vegetables in sauces.  Grains  Instant hot cereals. Bread stuffing, pancake, and biscuit mixes. Croutons. Seasoned rice or pasta mixes. Noodle soup cups. Boxed or frozen macaroni and cheese. Regular salted crackers. Self-rising flour.  Meats and other proteins  Meat or fish that is salted, canned, smoked, spiced, or pickled. Precooked or cured meat, such as sausages or meat loaves. Lennon. Ham. Pepperoni. Hot dogs. Corned beef. Chipped beef. Salt pork. Jerky. Pickled herring. Anchovies and sardines. Regular canned tuna. Salted nuts.  Dairy  Processed cheese and cheese spreads. Hard cheeses. Cheese curds. Blue cheese. Feta cheese. String cheese. Regular cottage cheese. Buttermilk. Canned milk.  Fats and oils  Salted butter. Regular margarine. Ghee. Lennon fat.  Seasonings and condiments  Onion salt, garlic salt, seasoned salt, table salt, and sea salt. Canned and packaged gravies. Worcestershire sauce. Tartar sauce. Barbecue sauce. Teriyaki sauce. Soy sauce, including reduced-sodium. Steak sauce. Fish sauce. Oyster sauce. Cocktail sauce. Horseradish that you find on the shelf. Regular ketchup and mustard. Meat flavorings and tenderizers. Bouillon cubes. Hot sauce. Pre-made or packaged marinades. Pre-made or packaged taco seasonings. Relishes. Regular salad dressings. Salsa.  Other foods  Salted popcorn and pretzels. Corn chips and puffs. Potato and tortilla chips. Canned or dried soups. Pizza. Frozen entrees and pot pies.  The items listed above may not be a complete list of foods and beverages you should avoid. Contact a dietitian for more information.  Summary  · Eating less sodium can help lower your blood pressure, reduce swelling, and protect your heart, liver, and kidneys.  · Most people on this plan should limit their sodium intake to 1,500-2,000 mg (milligrams) of sodium each day.  · Canned, boxed, and frozen foods are high in sodium. Restaurant foods, fast foods, and pizza are also very high in sodium.  You also get sodium by adding salt to food.  · Try to cook at home, eat more fresh fruits and vegetables, and eat less fast food and canned, processed, or prepared foods.  This information is not intended to replace advice given to you by your health care provider. Make sure you discuss any questions you have with your health care provider.  Document Revised: 01/22/2021 Document Reviewed: 11/18/2020  [employer] Patient Education © 2021 [employer]      Cholesterol Content in Foods  Cholesterol is a waxy, fat-like substance that helps to carry fat in the blood. The body needs cholesterol in small amounts, but too much cholesterol can cause damage to the arteries and heart.  Most people should eat less than 200 milligrams (mg) of cholesterol a day.  Foods with cholesterol    Cholesterol is found in animal-based foods, such as meat, seafood, and dairy. Generally, low-fat dairy and lean meats have less cholesterol than full-fat dairy and fatty meats. The milligrams of cholesterol per serving (mg per serving) of common cholesterol-containing foods are listed below.  Meat and other proteins  · Egg -- one large whole egg has 186 mg.  · Veal shank -- 4 oz has 141 mg.  · Lean ground turkey (93% lean) -- 4 oz has 118 mg.  · Fat-trimmed lamb loin -- 4 oz has 106 mg.  · Lean ground beef (90% lean) -- 4 oz has 100 mg.  · Lobster -- 3.5 oz has 90 mg.  · Pork loin chops -- 4 oz has 86 mg.  · Canned salmon -- 3.5 oz has 83 mg.  · Fat-trimmed beef top loin -- 4 oz has 78 mg.  · Frankfurter -- 1 jaz (3.5 oz) has 77 mg.  · Crab -- 3.5 oz has 71 mg.  · Roasted chicken without skin, white meat -- 4 oz has 66 mg.  · Light bologna -- 2 oz has 45 mg.  · Deli-cut turkey -- 2 oz has 31 mg.  · Canned tuna -- 3.5 oz has 31 mg.  · Lennon -- 1 oz has 29 mg.  · Oysters and mussels (raw) -- 3.5 oz has 25 mg.  · Mackerel -- 1 oz has 22 mg.  · Trout -- 1 oz has 20 mg.  · Pork sausage -- 1 link (1 oz) has 17 mg.  · [place] -- 1 oz has 16  mg.  · Tilapia -- 1 oz has 14 mg.  Dairy  · Soft-serve ice cream -- ½ cup (4 oz) has 103 mg.  · Whole-milk yogurt -- 1 cup (8 oz) has 29 mg.  · Cheddar cheese -- 1 oz has 28 mg.  · American cheese -- 1 oz has 28 mg.  · Whole milk -- 1 cup (8 oz) has 23 mg.  · 2% milk -- 1 cup (8 oz) has 18 mg.  · Cream cheese -- 1 tablespoon (Tbsp) has 15 mg.  · Cottage cheese -- ½ cup (4 oz) has 14 mg.  · Low-fat (1%) milk -- 1 cup (8 oz) has 10 mg.  · Sour cream -- 1 Tbsp has 8.5 mg.  · Low-fat yogurt -- 1 cup (8 oz) has 8 mg.  · Nonfat Greek yogurt -- 1 cup (8 oz) has 7 mg.  · Half-and-half cream -- 1 Tbsp has 5 mg.  Fats and oils  · Cod liver oil -- 1 tablespoon (Tbsp) has 82 mg.  · Butter -- 1 Tbsp has 15 mg.  · Lard -- 1 Tbsp has 14 mg.  · Lennon grease -- 1 Tbsp has 14 mg.  · Mayonnaise -- 1 Tbsp has 5-10 mg.  · Margarine -- 1 Tbsp has 3-10 mg.  Exact amounts of cholesterol in these foods may vary depending on specific ingredients and brands.  Foods without cholesterol  Most plant-based foods do not have cholesterol unless you combine them with a food that has cholesterol. Foods without cholesterol include:  · Grains and cereals.  · Vegetables.  · Fruits.  · Vegetable oils, such as olive, canola, and sunflower oil.  · Legumes, such as peas, beans, and lentils.  · Nuts and seeds.  · Egg whites.  Summary  · The body needs cholesterol in small amounts, but too much cholesterol can cause damage to the arteries and heart.  · Most people should eat less than 200 milligrams (mg) of cholesterol a day.  This information is not intended to replace advice given to you by your health care provider. Make sure you discuss any questions you have with your health care provider.  Document Revised: 05/10/2021 Document Reviewed: 05/10/2021  [employer] Patient Education © 2021 [employer]

## 2021-10-07 NOTE — PROGRESS NOTES
Chief Complaint  Rapid Heart Rate (episodes of fast heart rate  (started Victoza))    Subjective            History of Present Illness  Beena Osborne is a 40-year-old female patient who presents to the office today for follow-up.  He has history of inappropriate sinus tachycardia, hypertension, and hyperlipidemia.  He reports that she started new medication about a week and a half ago, Victoza and has had increased palpitations since then.  He has tried to cut out soda to see if this would help to decrease the palpitations but they are still occurring intermittently.  He reports heart rate up to 130s at times.  She reports compliance with all her medications.  She denies any chest pain, shortness of breath, lightheadedness/dizziness, or edema.    PMH  Past Medical History:   Diagnosis Date   • ADHD (attention deficit hyperactivity disorder)    • Allergic    • Allergic rhinitis 07/10/2015    WILL ADD FLONASE 2 CETIRIZINE   • Anemia    • Anxiety    • Asthma    • B12 deficiency    • Depression    • Difficulty concentrating 07/15/2016    WILL TRY STRATERA   • Graves disease 07/10/2015   • HLD (hyperlipidemia) 07/10/2015    WILL CHECK LABS AND ADJUST MEDS ACCORDINGLY   • Hyperlipidemia LDL goal <100 7/10/2015   • Hypertension    • Impaired fasting glucose    • Inappropriate sinus tachycardia 10/7/2021    05/03/21 Holter:  1.  Normal average heart rate of 98 beats per minute. 2.  There was one PVC recorded.    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   • Vitamin D deficiency          ALLERGY  Allergies   Allergen Reactions   • Meperidine Hallucinations    • Tetanus Immune Globulin GI Intolerance   • Amlodipine Diarrhea   • Ondansetron Hcl Other (See Comments)     Fever            SURGICALHX  Past Surgical History:   Procedure Laterality Date   • APPENDECTOMY  1984   • CYST REMOVAL      NECK   • INCISION AND DRAINAGE ABSCESS      NECK   • OTHER SURGICAL HISTORY      thyroid ablation   • OVARIAN CYST REMOVAL  1999   • SEPTOPLASTY  2012   • SINUS SURGERY  2012          SOC  Social History     Socioeconomic History   • Marital status: Single     Spouse name: Not on file   • Number of children: Not on file   • Years of education: Not on file   • Highest education level: Not on file   Tobacco Use   • Smoking status: Never Smoker   • Smokeless tobacco: Never Used   Vaping Use   • Vaping Use: Never used   Substance and Sexual Activity   • Alcohol use: Yes     Alcohol/week: 1.0 standard drinks     Types: 1 Shots of liquor per week     Comment: rarely   • Drug use: Never   • Sexual activity: Defer         FAMHX  Family History   Problem Relation Age of Onset   • COPD Father    • Cancer Father    • Osteoporosis Father    • Arrhythmia Father    • Hypothyroidism Father    • Stomach cancer Maternal Grandfather    • Other Mother         cognitave impairment   • Osteopenia Mother    • Hypothyroidism Mother    • Testicular cancer Brother    • Lung cancer Paternal Grandmother           MEDSIGONLY  Current Outpatient Medications on File Prior to Visit   Medication Sig   • albuterol sulfate HFA (Ventolin HFA) 108 (90 Base) MCG/ACT inhaler Inhale 2 puffs Every 4 (Four) Hours As Needed for Wheezing.   • azelastine (ASTELIN) 0.1 % nasal spray    • Bromelain 100 MG tablet Take 100 mg by mouth Daily.   • cetirizine (zyrTEC) 10 MG tablet Take 10 mg by mouth Daily.   • Comfort EZ Pen Needles 31G X 5 MM misc    • folic acid (FOLVITE) 400 MCG tablet Take 400 mcg by mouth Daily.   • Guggulipid-Black Pepper (GUGLIPID/BIOPERINE PO) Take 10 mg by mouth Daily.   • Insulin Pen Needle (Comfort EZ Pen  "Needles) 31G X 5 MM misc 1 each Daily.   • Iodine Strong, Lugols, (IODINE STRONG PO) Take  by mouth Daily.   • Jaimiess 0.15-0.03 &0.01 MG tablet    • levonorgestrel-ethinyl estradiol (Larissia) 0.1-20 MG-MCG per tablet Take 1 tablet by mouth Daily.   • losartan (COZAAR) 50 MG tablet Take 1 tablet by mouth Daily.   • metoprolol succinate XL (TOPROL-XL) 25 MG 24 hr tablet Take 1 tablet by mouth Daily.   • montelukast (SINGULAIR) 10 MG tablet Take 10 mg by mouth Every Night.   • predniSONE (DELTASONE) 5 MG tablet As Needed.   • Quercetin Dihydrate powder 500 mg Daily.   • Secukinumab, 300 MG Dose, 150 MG/ML solution prefilled syringe Inject 300 mg under the skin into the appropriate area as directed Every 30 (Thirty) Days.   • Selenium 200 MCG tablet Take  by mouth Daily.   • Thyroid (NP THYROID) 120 MG PO tablet Take 1 tablet by mouth Daily.   • TURMERIC PO Take 800 mg by mouth Daily.   • Victoza 18 MG/3ML solution pen-injector injection Daily.   • VITAMIN D PO Take 50 Units by mouth.   • Zinc 15 MG capsule Take 15 mg by mouth Daily.   • metFORMIN (Glucophage) 500 MG tablet Take 1 tablet by mouth 2 (Two) Times a Day With Meals.   • thyroid (NP Thyroid) 15 MG tablet Take 1 tablet by mouth Daily. Take with 120 mg dose for total dose of 135 mg   • Thyroid (NP THYROID) 60 MG PO tablet Take 2 tablets by mouth Daily.   • [DISCONTINUED] Beclomethasone Dipropionate (Qnasl) 80 MCG/ACT aerosol solution into the nostril(s) as directed by provider.     No current facility-administered medications on file prior to visit.         Objective   /90   Pulse 92   Ht 162.6 cm (64\")   Wt (!) 137 kg (302 lb)   BMI 51.84 kg/m²       Physical Exam  HENT:      Head: Normocephalic.   Neck:      Vascular: No carotid bruit.   Cardiovascular:      Rate and Rhythm: Normal rate and regular rhythm.      Pulses: Normal pulses.      Heart sounds: Normal heart sounds.   Pulmonary:      Effort: Pulmonary effort is normal.      Breath sounds: " Normal breath sounds.   Musculoskeletal:      Cervical back: Neck supple.   Skin:     General: Skin is dry.      Capillary Refill: Capillary refill takes less than 2 seconds.   Neurological:      Mental Status: She is alert and oriented to person, place, and time.   Psychiatric:         Mood and Affect: Mood normal.         Behavior: Behavior normal.       Result Review :   The following data was reviewed by: MIC Manriquez on 10/07/2021:  No results found for: PROBNP  CMP    CMP 7/1/21   Glucose 126 (A)   BUN 10   Creatinine 0.79   eGFR Non African Am 81   Sodium 137   Potassium 4.2   Chloride 102   Calcium 9.3   Albumin 4.00   Total Bilirubin 0.2   Alkaline Phosphatase 57   AST (SGOT) 12   ALT (SGPT) 12   (A) Abnormal value            CBC w/diff    CBC w/Diff 7/1/21   WBC 12.69 (A)   RBC 4.57   Hemoglobin 13.5   Hematocrit 40.6   MCV 88.8   MCH 29.5   MCHC 33.3   RDW 13.4   Platelets 451 (A)   Neutrophil Rel % 50.2   Immature Granulocyte Rel % 1.8 (A)   Lymphocyte Rel % 38.7   Monocyte Rel % 4.8 (A)   Eosinophil Rel % 3.6   Basophil Rel % 0.9   (A) Abnormal value             Lab Results   Component Value Date    TSH 15.000 (H) 07/01/2021      Lab Results   Component Value Date    FREET4 0.57 (L) 07/01/2021      No results found for: DDIMERQUANT  Magnesium   Date Value Ref Range Status   07/01/2021 1.9 1.6 - 2.6 mg/dL Final      No results found for: DIGOXIN   No results found for: TROPONINT        Lipid Panel    Lipid Panel 7/1/21   Total Cholesterol 199   Triglycerides 148   HDL Cholesterol 57   VLDL Cholesterol 26   LDL Cholesterol  116 (A)   LDL/HDL Ratio 1.97   (A) Abnormal value                 Assessment and Plan    Diagnoses and all orders for this visit:    1. Inappropriate sinus tachycardia (Primary)  She reports increased episodes of palpitations with intermittent chest pain.  Obtain treadmill stress test to rule out ischemia and echocardiogram to rule out any valvular dysfunction.  -      Treadmill Stress Test; Future  -     Adult Transthoracic Echo Complete W/ Cont if Necessary Per Protocol; Future    2. Essential hypertension  Currently controlled and without adverse effects from medication, continue losartan 50 mg daily and increase metoprolol from 25 mg daily to 50 mg daily.  Continue to monitor blood pressure and heart rate over the next couple of weeks and notify office of any out of range.    3. Hyperlipidemia LDL goal <100  Last lipid panel was 7/1/2021 with LDL of 116 which is slightly out of range, she is not currently on statin therapy and is not eligible due to age.  Repeat lipid panel with next visit.      Follow Up   Return in about 6 months (around 4/7/2022) for Follow up with Dr Keene.    Patient was given instructions and counseling regarding her condition or for health maintenance advice. Please see specific information pulled into the AVS if appropriate.     Beena Osborne  reports that she has never smoked. She has never used smokeless tobacco..         Meredith Page, APRN  10/07/21  09:17 EDT    Dictated Utilizing Dragon Dictation

## 2021-11-10 RX ORDER — LEVOTHYROXINE, LIOTHYRONINE 38; 9 UG/1; UG/1
TABLET ORAL
Qty: 60 TABLET | Refills: 2 | Status: SHIPPED | OUTPATIENT
Start: 2021-11-10 | End: 2021-11-23 | Stop reason: SDUPTHER

## 2021-11-23 ENCOUNTER — OFFICE VISIT (OUTPATIENT)
Dept: FAMILY MEDICINE CLINIC | Facility: CLINIC | Age: 41
End: 2021-11-23

## 2021-11-23 VITALS
SYSTOLIC BLOOD PRESSURE: 160 MMHG | BODY MASS INDEX: 50.02 KG/M2 | OXYGEN SATURATION: 96 % | HEART RATE: 102 BPM | WEIGHT: 293 LBS | HEIGHT: 64 IN | DIASTOLIC BLOOD PRESSURE: 86 MMHG | TEMPERATURE: 98.2 F

## 2021-11-23 DIAGNOSIS — E55.9 VITAMIN D DEFICIENCY: ICD-10-CM

## 2021-11-23 DIAGNOSIS — E03.8 SECONDARY HYPOTHYROIDISM: ICD-10-CM

## 2021-11-23 DIAGNOSIS — E11.9 TYPE 2 DIABETES MELLITUS WITHOUT COMPLICATION, WITHOUT LONG-TERM CURRENT USE OF INSULIN (HCC): ICD-10-CM

## 2021-11-23 DIAGNOSIS — D64.9 ANEMIA, UNSPECIFIED TYPE: ICD-10-CM

## 2021-11-23 DIAGNOSIS — J01.40 ACUTE NON-RECURRENT PANSINUSITIS: ICD-10-CM

## 2021-11-23 DIAGNOSIS — I10 ESSENTIAL HYPERTENSION: Primary | ICD-10-CM

## 2021-11-23 LAB
25(OH)D3 SERPL-MCNC: 44.6 NG/ML (ref 30–100)
ALBUMIN SERPL-MCNC: 4 G/DL (ref 3.5–5.2)
ALBUMIN/GLOB SERPL: 1.1 G/DL
ALP SERPL-CCNC: 57 U/L (ref 39–117)
ALT SERPL W P-5'-P-CCNC: 23 U/L (ref 1–33)
ANION GAP SERPL CALCULATED.3IONS-SCNC: 11.1 MMOL/L (ref 5–15)
AST SERPL-CCNC: 25 U/L (ref 1–32)
BILIRUB SERPL-MCNC: 0.2 MG/DL (ref 0–1.2)
BUN SERPL-MCNC: 7 MG/DL (ref 6–20)
BUN/CREAT SERPL: 9.1 (ref 7–25)
CALCIUM SPEC-SCNC: 9.7 MG/DL (ref 8.6–10.5)
CHLORIDE SERPL-SCNC: 103 MMOL/L (ref 98–107)
CHOLEST SERPL-MCNC: 179 MG/DL (ref 0–200)
CO2 SERPL-SCNC: 24.9 MMOL/L (ref 22–29)
CREAT SERPL-MCNC: 0.77 MG/DL (ref 0.57–1)
FERRITIN SERPL-MCNC: 102 NG/ML (ref 13–150)
FOLATE SERPL-MCNC: 3.52 NG/ML (ref 4.78–24.2)
GFR SERPL CREATININE-BSD FRML MDRD: 83 ML/MIN/1.73
GLOBULIN UR ELPH-MCNC: 3.5 GM/DL
GLUCOSE SERPL-MCNC: 130 MG/DL (ref 65–99)
HDLC SERPL-MCNC: 54 MG/DL (ref 40–60)
IRON 24H UR-MRATE: 48 MCG/DL (ref 37–145)
IRON SATN MFR SERPL: 11 % (ref 20–50)
LDLC SERPL CALC-MCNC: 103 MG/DL (ref 0–100)
LDLC/HDLC SERPL: 1.87 {RATIO}
POTASSIUM SERPL-SCNC: 4.6 MMOL/L (ref 3.5–5.2)
PROT SERPL-MCNC: 7.5 G/DL (ref 6–8.5)
SODIUM SERPL-SCNC: 139 MMOL/L (ref 136–145)
T4 FREE SERPL-MCNC: 0.65 NG/DL (ref 0.93–1.7)
TIBC SERPL-MCNC: 419 MCG/DL (ref 298–536)
TRANSFERRIN SERPL-MCNC: 281 MG/DL (ref 200–360)
TRIGL SERPL-MCNC: 121 MG/DL (ref 0–150)
TSH SERPL DL<=0.05 MIU/L-ACNC: 27.3 UIU/ML (ref 0.27–4.2)
VIT B12 BLD-MCNC: 471 PG/ML (ref 211–946)
VLDLC SERPL-MCNC: 22 MG/DL (ref 5–40)

## 2021-11-23 PROCEDURE — 84443 ASSAY THYROID STIM HORMONE: CPT | Performed by: NURSE PRACTITIONER

## 2021-11-23 PROCEDURE — 83036 HEMOGLOBIN GLYCOSYLATED A1C: CPT | Performed by: NURSE PRACTITIONER

## 2021-11-23 PROCEDURE — 82043 UR ALBUMIN QUANTITATIVE: CPT | Performed by: NURSE PRACTITIONER

## 2021-11-23 PROCEDURE — 82728 ASSAY OF FERRITIN: CPT | Performed by: NURSE PRACTITIONER

## 2021-11-23 PROCEDURE — 90686 IIV4 VACC NO PRSV 0.5 ML IM: CPT | Performed by: NURSE PRACTITIONER

## 2021-11-23 PROCEDURE — 84466 ASSAY OF TRANSFERRIN: CPT | Performed by: NURSE PRACTITIONER

## 2021-11-23 PROCEDURE — 80061 LIPID PANEL: CPT | Performed by: NURSE PRACTITIONER

## 2021-11-23 PROCEDURE — 83540 ASSAY OF IRON: CPT | Performed by: NURSE PRACTITIONER

## 2021-11-23 PROCEDURE — 84439 ASSAY OF FREE THYROXINE: CPT | Performed by: NURSE PRACTITIONER

## 2021-11-23 PROCEDURE — 84482 T3 REVERSE: CPT | Performed by: NURSE PRACTITIONER

## 2021-11-23 PROCEDURE — 80053 COMPREHEN METABOLIC PANEL: CPT | Performed by: NURSE PRACTITIONER

## 2021-11-23 PROCEDURE — 82306 VITAMIN D 25 HYDROXY: CPT | Performed by: NURSE PRACTITIONER

## 2021-11-23 PROCEDURE — 82607 VITAMIN B-12: CPT | Performed by: NURSE PRACTITIONER

## 2021-11-23 PROCEDURE — 90471 IMMUNIZATION ADMIN: CPT | Performed by: NURSE PRACTITIONER

## 2021-11-23 PROCEDURE — 82746 ASSAY OF FOLIC ACID SERUM: CPT | Performed by: NURSE PRACTITIONER

## 2021-11-23 PROCEDURE — 99214 OFFICE O/P EST MOD 30 MIN: CPT | Performed by: NURSE PRACTITIONER

## 2021-11-23 PROCEDURE — 82570 ASSAY OF URINE CREATININE: CPT | Performed by: NURSE PRACTITIONER

## 2021-11-23 PROCEDURE — 84481 FREE ASSAY (FT-3): CPT | Performed by: NURSE PRACTITIONER

## 2021-11-23 RX ORDER — ADALIMUMAB 40MG/0.4ML
40 KIT SUBCUTANEOUS
COMMUNITY
End: 2022-10-17 | Stop reason: SDUPTHER

## 2021-11-23 RX ORDER — SEMAGLUTIDE 1.34 MG/ML
0.25 INJECTION, SOLUTION SUBCUTANEOUS WEEKLY
COMMUNITY
Start: 2021-11-03 | End: 2022-03-01 | Stop reason: SINTOL

## 2021-11-23 RX ORDER — AMOXICILLIN 875 MG/1
875 TABLET, COATED ORAL 2 TIMES DAILY
Qty: 20 TABLET | Refills: 0 | Status: SHIPPED | OUTPATIENT
Start: 2021-11-23 | End: 2021-12-03

## 2021-11-23 RX ORDER — LEVONORGESTREL / ETHINYL ESTRADIOL AND ETHINYL ESTRADIOL 150-30(84)
1 KIT ORAL DAILY
Qty: 84 EACH | Refills: 3 | Status: SHIPPED | OUTPATIENT
Start: 2021-11-23 | End: 2022-03-01 | Stop reason: SDUPTHER

## 2021-11-23 RX ORDER — LEVOTHYROXINE AND LIOTHYRONINE 76; 18 UG/1; UG/1
120 TABLET ORAL DAILY
Qty: 90 TABLET | Refills: 1 | Status: SHIPPED | OUTPATIENT
Start: 2021-11-23 | End: 2022-03-01 | Stop reason: SDUPTHER

## 2021-11-23 RX ORDER — LOSARTAN POTASSIUM 50 MG/1
50 TABLET ORAL DAILY
Qty: 90 TABLET | Refills: 1 | Status: SHIPPED | OUTPATIENT
Start: 2021-11-23 | End: 2022-03-01 | Stop reason: SDUPTHER

## 2021-11-23 NOTE — ASSESSMENT & PLAN NOTE
Hypertension is Elevated in the clinic but within normal limits at home per patient.  Continue current treatment regimen.  Continue current medications.  Blood pressure will be reassessed in 3 months.

## 2021-11-23 NOTE — ASSESSMENT & PLAN NOTE
Diabetes is unchanged.   Continue current treatment regimen.  She has appointment with endocrinology next month.  Diabetes will be reassessed in 3 months.

## 2021-11-23 NOTE — PROGRESS NOTES
"Chief Complaint  Hypothyroidism, Hypertension, and Sinus Problem (possible sinus infection)    Subjective          Beena Osborne presents to [facility]  History of Present Illness  She is here today for follow-up on her thyroid and diabetes.  She states she has an appointment with an endocrinologist in [place] in December.  She states she is wanting to find out if she has Cushing's disease.    Diabetes type 2, non-insulin-dependent: Her last A1c was 7%.  She did not tolerate Victoza and is currently on Ozempic.  She states Metformin never worked for her.    Hypothyroidism secondary to disease.  She is currently on NP thyroid 120 mg daily.  She states that her \"liver doctor\" wants her to have free T4, reverse T3, folic acid and vitamin D levels drawn today.    She does have a history of anemia, vitamin D deficiency, folate deficiency.    Hypertension: Her blood pressure is noted to be elevated today 160/86.  She states she checks her blood pressure at home and is always within normal limits.  She states it always goes up when she is at the doctor's office.  She is currently on metoprolol 50 mg daily and losartan 50 mg daily.    She is complaining of a sinus infection this been going on for about 3 weeks.  She complains of head congestion and ear pain.  She denies any sore throat.  She does complain of a cough with thick mucus.  Objective   Vital Signs:   /86   Pulse 102   Temp 98.2 °F (36.8 °C)   Ht 162.6 cm (64\")   Wt (!) 139 kg (306 lb 9.6 oz)   SpO2 96%   BMI 52.63 kg/m²     Physical Exam  Vitals reviewed.   Constitutional:       Appearance: Normal appearance. She is well-developed. She is obese.   Neck:      Thyroid: No thyroid mass, thyromegaly or thyroid tenderness.   Cardiovascular:      Rate and Rhythm: Normal rate and regular rhythm.      Heart sounds: No murmur heard.  No friction rub. No gallop.    Pulmonary:      Effort: Pulmonary effort is normal.      " Breath sounds: Normal breath sounds. No wheezing or rhonchi.   Lymphadenopathy:      Cervical: No cervical adenopathy.   Skin:     General: Skin is warm and dry.   Neurological:      Mental Status: She is alert and oriented to person, place, and time.      Cranial Nerves: No cranial nerve deficit.   Psychiatric:         Mood and Affect: Mood and affect normal.         Behavior: Behavior normal.         Thought Content: Thought content normal. Thought content does not include homicidal or suicidal ideation.         Judgment: Judgment normal.        Result Review :                 Assessment and Plan    Diagnoses and all orders for this visit:    1. Essential hypertension (Primary)  Assessment & Plan:  Hypertension is Elevated in the clinic but within normal limits at home per patient.  Continue current treatment regimen.  Continue current medications.  Blood pressure will be reassessed in 3 months.      2. Secondary hypothyroidism  Assessment & Plan:  Currently stable on NP thyroid.  Thyroid levels will be obtained today with labs.    Orders:  -     TSH+Free T4  -     T3, Reverse  -     T3, Free    3. Type 2 diabetes mellitus without complication, without long-term current use of insulin (HCC)  Assessment & Plan:  Diabetes is unchanged.   Continue current treatment regimen.  She has appointment with endocrinology next month.  Diabetes will be reassessed in 3 months.    Orders:  -     Hemoglobin A1c  -     Comprehensive Metabolic Panel  -     Lipid Panel  -     Microalbumin / Creatinine Urine Ratio - Urine, Clean Catch    4. Acute non-recurrent pansinusitis  Comments:  We will treat her with amoxicillin, advised to take all medication even when feeling better.    5. Vitamin D deficiency  Comments:  We will check vitamin D level with her labs today.  Orders:  -     Vitamin D 25 Hydroxy    6. Anemia, unspecified type  Comments:  We will check labs today to include iron levels and B12/folate.  Orders:  -     Vitamin B12  & Folate  -     Iron Profile  -     Ferritin    Other orders  -     FluLaval/Fluarix/Fluzone >6 Months ([id number])  -     Thyroid (NP THYROID) 120 MG PO tablet; Take 1 tablet by mouth Daily.  Dispense: 90 tablet; Refill: 1  -     losartan (COZAAR) 50 MG tablet; Take 1 tablet by mouth Daily.  Dispense: 90 tablet; Refill: 1  -     Jaimiess 0.15-0.03 &0.01 MG tablet; Take 1 tablet by mouth Daily.  Dispense: 84 each; Refill: 3  -     amoxicillin (AMOXIL) 875 MG tablet; Take 1 tablet by mouth 2 (Two) Times a Day for 10 days.  Dispense: 20 tablet; Refill: 0      Follow Up   Return in about 3 months (around 2/23/2022) for Next scheduled follow up.  Patient was given instructions and counseling regarding her condition or for health maintenance advice. Please see specific information pulled into the AVS if appropriate.

## 2021-11-24 LAB
ALBUMIN UR-MCNC: 64.7 MG/DL
CREAT UR-MCNC: 174.1 MG/DL
HBA1C MFR BLD: 7.1 % (ref 4.8–5.6)
MICROALBUMIN/CREAT UR: 371.6 MG/G
T3FREE SERPL-MCNC: 3.89 PG/ML (ref 2–4.4)

## 2021-11-24 RX ORDER — FOLIC ACID 1 MG/1
1 TABLET ORAL DAILY
Qty: 90 TABLET | Refills: 3 | Status: SHIPPED | OUTPATIENT
Start: 2021-11-24 | End: 2022-03-01 | Stop reason: SDUPTHER

## 2021-12-01 LAB — T3REVERSE SERPL-MCNC: 14.5 NG/DL (ref 9.2–24.1)

## 2021-12-01 RX ORDER — FLUCONAZOLE 150 MG/1
TABLET ORAL
Qty: 2 TABLET | Refills: 0 | Status: SHIPPED | OUTPATIENT
Start: 2021-12-01 | End: 2022-03-01

## 2021-12-20 ENCOUNTER — TELEMEDICINE (OUTPATIENT)
Dept: FAMILY MEDICINE CLINIC | Facility: TELEHEALTH | Age: 41
End: 2021-12-20

## 2021-12-20 VITALS — BODY MASS INDEX: 50.02 KG/M2 | HEIGHT: 64 IN | WEIGHT: 293 LBS

## 2021-12-20 DIAGNOSIS — Z11.52 ENCOUNTER FOR SCREENING FOR COVID-19: Primary | ICD-10-CM

## 2021-12-20 DIAGNOSIS — R51.9 NONINTRACTABLE HEADACHE, UNSPECIFIED CHRONICITY PATTERN, UNSPECIFIED HEADACHE TYPE: ICD-10-CM

## 2021-12-20 PROCEDURE — 99213 OFFICE O/P EST LOW 20 MIN: CPT | Performed by: NURSE PRACTITIONER

## 2021-12-20 PROCEDURE — U0004 COV-19 TEST NON-CDC HGH THRU: HCPCS | Performed by: NURSE PRACTITIONER

## 2021-12-20 NOTE — PROGRESS NOTES
You have chosen to receive care through a telehealth visit.  Do you consent to use a video/audio connection for your medical care today? Yes     CHIEF COMPLAINT  Cc: covid screening    HPI  Beena Osborne is a 41 y.o. female presents requesting a COVID screening. She had a direct exposre to COVID for 3 days. She is vaccinated via 3 doses of the Pfizer vaccine for COVID-19. She does have headaches but relates this to 3 pockets of brain edema that she has and is under care for at this time. She reports a follow up appointment with a neurologist for this. No other symptoms to report.    Review of Systems   Constitutional: Negative for fatigue and fever.   HENT: Negative for congestion, sinus pressure, sinus pain and sore throat.    Respiratory: Negative for cough, chest tightness, shortness of breath and wheezing.    Cardiovascular: Negative for chest pain.   Gastrointestinal: Negative for diarrhea, nausea and vomiting.   Musculoskeletal: Negative for myalgias.   Neurological: Positive for headaches.       Past Medical History:   Diagnosis Date   • ADHD (attention deficit hyperactivity disorder)    • Allergic    • Allergic rhinitis 07/10/2015    WILL ADD FLONASE 2 CETIRIZINE   • Anemia    • Anxiety    • Asthma    • B12 deficiency    • Depression    • Difficulty concentrating 07/15/2016    WILL TRY STRATERA   • Graves disease 07/10/2015   • HLD (hyperlipidemia) 07/10/2015    WILL CHECK LABS AND ADJUST MEDS ACCORDINGLY   • Hyperlipidemia LDL goal <100 7/10/2015   • Hypertension    • Hyperthyroidism    • Impaired fasting glucose    • Inappropriate sinus tachycardia 10/7/2021    05/03/21 Holter:  1.  Normal average heart rate of 98 beats per minute. 2.  There was one PVC recorded.    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/10/2015   • Visual impairment    • Vitamin D deficiency        Family History   Problem Relation Age of Onset   • COPD Father    • Cancer Father    • Osteoporosis Father    • Arrhythmia Father    • Hypothyroidism Father    • Mental illness Maternal Grandmother    • Stomach cancer Maternal Grandfather    • Alcohol abuse Maternal Grandfather    • Mental illness Maternal Grandfather    • Other Mother         cognitave impairment   • Osteopenia Mother    • Hypothyroidism Mother    • Mental illness Mother    • Testicular cancer Brother    • Lung cancer Paternal Grandmother    • Alcohol abuse Maternal Uncle    • Alcohol abuse Paternal Uncle    • Alcohol abuse Sister    • Mental illness Paternal Uncle    • Mental illness Sister    • Mental illness Sister    • Mental illness Brother        Social History     Socioeconomic History   • Marital status: Single   Tobacco Use   • Smoking status: Never Smoker   • Smokeless tobacco: Never Used   Vaping Use   • Vaping Use: Never used   Substance and Sexual Activity   • Alcohol use: Not Currently     Alcohol/week: 0.0 standard drinks     Comment: rarely   • Drug use: Never   • Sexual activity: Never         Ht 162.6 cm (64\")   Wt 136 kg (300 lb)   Breastfeeding No   BMI 51.49 kg/m²     PHYSICAL EXAM  Physical Exam   Constitutional: She is oriented to person, place, and time. She appears well-developed and well-nourished.   HENT:   Head: Normocephalic and atraumatic.   Right Ear: External ear normal.   Left Ear: External ear normal.   Nose: Nose normal.   Mouth/Throat: Mouth/Lips are normal.  Eyes: Lids are normal. Right eye exhibits no discharge and no exudate. Left eye exhibits no discharge and no exudate. Right conjunctiva is not injected. Left conjunctiva is not injected. "   Pulmonary/Chest: No accessory muscle usage. No tachypnea and no bradypnea.  No respiratory distress.No use of oxygen by nasal cannulaNo use of oxygen by mask noted.  Abdominal: Abdomen appears normal.   Neurological: She is alert and oriented to person, place, and time. No cranial nerve deficit.   Skin: Her skin appears normal.  Psychiatric: She has a normal mood and affect. Her speech is normal and behavior is normal. Judgment and thought content normal.       Results for orders placed or performed in visit on 11/23/21   TSH+Free T4    Specimen: Blood   Result Value Ref Range    TSH 27.300 (H) 0.270 - 4.200 uIU/mL    Free T4 0.65 (L) 0.93 - 1.70 ng/dL   T3, Reverse    Specimen: Blood   Result Value Ref Range    T3, Reverse 14.5 9.2 - 24.1 ng/dL   T3, Free    Specimen: Blood   Result Value Ref Range    T3, Free 3.89 2.00 - 4.40 pg/mL   Hemoglobin A1c    Specimen: Blood   Result Value Ref Range    Hemoglobin A1C 7.10 (H) 4.80 - 5.60 %   Comprehensive Metabolic Panel    Specimen: Blood   Result Value Ref Range    Glucose 130 (H) 65 - 99 mg/dL    BUN 7 6 - 20 mg/dL    Creatinine 0.77 0.57 - 1.00 mg/dL    Sodium 139 136 - 145 mmol/L    Potassium 4.6 3.5 - 5.2 mmol/L    Chloride 103 98 - 107 mmol/L    CO2 24.9 22.0 - 29.0 mmol/L    Calcium 9.7 8.6 - 10.5 mg/dL    Total Protein 7.5 6.0 - 8.5 g/dL    Albumin 4.00 3.50 - 5.20 g/dL    ALT (SGPT) 23 1 - 33 U/L    AST (SGOT) 25 1 - 32 U/L    Alkaline Phosphatase 57 39 - 117 U/L    Total Bilirubin 0.2 0.0 - 1.2 mg/dL    eGFR Non African Amer 83 >60 mL/min/1.73    Globulin 3.5 gm/dL    A/G Ratio 1.1 g/dL    BUN/Creatinine Ratio 9.1 7.0 - 25.0    Anion Gap 11.1 5.0 - 15.0 mmol/L   Lipid Panel    Specimen: Blood   Result Value Ref Range    Total Cholesterol 179 0 - 200 mg/dL    Triglycerides 121 0 - 150 mg/dL    HDL Cholesterol 54 40 - 60 mg/dL    LDL Cholesterol  103 (H) 0 - 100 mg/dL    VLDL Cholesterol 22 5 - 40 mg/dL    LDL/HDL Ratio 1.87    Vitamin D 25 Hydroxy    Specimen:  Blood   Result Value Ref Range    25 Hydroxy, Vitamin D 44.6 30.0 - 100.0 ng/ml   Vitamin B12 & Folate    Specimen: Blood   Result Value Ref Range    Folate 3.52 (L) 4.78 - 24.20 ng/mL    Vitamin B-12 471 211 - 946 pg/mL   Iron Profile    Specimen: Blood   Result Value Ref Range    Iron 48 37 - 145 mcg/dL    Iron Saturation 11 (L) 20 - 50 %    Transferrin 281 200 - 360 mg/dL    TIBC 419 298 - 536 mcg/dL   Ferritin    Specimen: Blood   Result Value Ref Range    Ferritin 102.00 13.00 - 150.00 ng/mL   Microalbumin / Creatinine Urine Ratio - Urine, Clean Catch    Specimen: Urine, Clean Catch   Result Value Ref Range    Microalbumin/Creatinine Ratio 371.6 mg/g    Creatinine, Urine 174.1 mg/dL    Microalbumin, Urine 64.7 mg/dL       Diagnoses and all orders for this visit:    1. Encounter for screening for COVID-19 (Primary)  -     COVID-19,CEPHEID/CAM,COR/MANN/PAD/HUGO IN-HOUSE(OR EMERGENT/ADD-ON),NP SWAB IN TRANSPORT MEDIA 3-4 HR TAT, RT-PCR - Swab, Nasopharynx; Future    2. Nonintractable headache, unspecified chronicity pattern, unspecified headache type    Isolate until COVID-19 test results are back  If your COVID test comes back negative and you are symptom and fever free for 24 hours you may discontinue isolation  COVID-19 test and results pending. We will call results to you and they can also be found in MY Chart    FOLLOW-UP  If symptoms develop follow up with PCP/[facility] or Urgent Care    Patient verbalizes understanding of medication dosage, comfort measures, instructions for treatment and follow-up.    Suzi Shaver, MIC  12/20/2021  12:35 EST    This visit was performed via Telehealth.  This patient has been instructed to follow-up with their primary care provider if their symptoms worsen or the treatment provided does not resolve their illness.

## 2022-01-05 ENCOUNTER — TRANSCRIBE ORDERS (OUTPATIENT)
Dept: LAB | Facility: HOSPITAL | Age: 42
End: 2022-01-05

## 2022-01-05 ENCOUNTER — LAB (OUTPATIENT)
Dept: LAB | Facility: HOSPITAL | Age: 42
End: 2022-01-05

## 2022-01-05 DIAGNOSIS — E03.8 SECONDARY HYPOTHYROIDISM: ICD-10-CM

## 2022-01-05 DIAGNOSIS — R29.818 CUSHING PHENOMENON: ICD-10-CM

## 2022-01-05 DIAGNOSIS — R29.818 CUSHING PHENOMENON: Primary | ICD-10-CM

## 2022-01-05 LAB
CORTIS SERPL-MCNC: 2.02 MCG/DL
T4 FREE SERPL-MCNC: 0.66 NG/DL (ref 0.93–1.7)
TSH SERPL DL<=0.05 MIU/L-ACNC: 9.82 UIU/ML (ref 0.27–4.2)

## 2022-01-05 PROCEDURE — 82533 TOTAL CORTISOL: CPT

## 2022-01-05 PROCEDURE — 80299 QUANTITATIVE ASSAY DRUG: CPT

## 2022-01-05 PROCEDURE — 84439 ASSAY OF FREE THYROXINE: CPT

## 2022-01-05 PROCEDURE — 84443 ASSAY THYROID STIM HORMONE: CPT

## 2022-01-05 PROCEDURE — 36415 COLL VENOUS BLD VENIPUNCTURE: CPT

## 2022-01-11 LAB — DEXAMETHASONE SERPL-MCNC: 320 NG/DL

## 2022-01-17 ENCOUNTER — TRANSCRIBE ORDERS (OUTPATIENT)
Dept: LAB | Facility: HOSPITAL | Age: 42
End: 2022-01-17

## 2022-01-17 ENCOUNTER — LAB (OUTPATIENT)
Dept: LAB | Facility: HOSPITAL | Age: 42
End: 2022-01-17

## 2022-01-17 DIAGNOSIS — R29.818 CUSHING PHENOMENON: Primary | ICD-10-CM

## 2022-01-17 DIAGNOSIS — R29.818 CUSHING PHENOMENON: ICD-10-CM

## 2022-01-17 PROCEDURE — 81050 URINALYSIS VOLUME MEASURE: CPT

## 2022-01-17 PROCEDURE — 82530 CORTISOL FREE: CPT

## 2022-01-25 ENCOUNTER — PATIENT MESSAGE (OUTPATIENT)
Dept: FAMILY MEDICINE CLINIC | Facility: CLINIC | Age: 42
End: 2022-01-25

## 2022-01-25 NOTE — TELEPHONE ENCOUNTER
From: Beena Osborne  To: MIC Nagy  Sent: 1/25/2022 2:52 PM EST  Subject: new insurance    I have changed insurance and need you to send my meds to them. They use [facility].     Thank you

## 2022-01-26 LAB
CORTIS F 24H UR-MRATE: 56 UG/24 HR (ref 6–42)
CORTIS F UR-MCNC: 40 UG/L

## 2022-03-01 ENCOUNTER — OFFICE VISIT (OUTPATIENT)
Dept: FAMILY MEDICINE CLINIC | Facility: CLINIC | Age: 42
End: 2022-03-01

## 2022-03-01 VITALS
TEMPERATURE: 97.8 F | SYSTOLIC BLOOD PRESSURE: 132 MMHG | HEART RATE: 80 BPM | OXYGEN SATURATION: 96 % | DIASTOLIC BLOOD PRESSURE: 88 MMHG | WEIGHT: 293 LBS | HEIGHT: 64 IN | BODY MASS INDEX: 50.02 KG/M2

## 2022-03-01 DIAGNOSIS — I10 ESSENTIAL HYPERTENSION: ICD-10-CM

## 2022-03-01 DIAGNOSIS — E66.01 MORBID OBESITY WITH BODY MASS INDEX (BMI) OF 40.0 OR HIGHER: ICD-10-CM

## 2022-03-01 DIAGNOSIS — E11.65 TYPE 2 DIABETES MELLITUS WITH HYPERGLYCEMIA, WITHOUT LONG-TERM CURRENT USE OF INSULIN: Primary | ICD-10-CM

## 2022-03-01 DIAGNOSIS — Z12.31 ENCOUNTER FOR SCREENING MAMMOGRAM FOR MALIGNANT NEOPLASM OF BREAST: ICD-10-CM

## 2022-03-01 DIAGNOSIS — Z82.62 FAMILY HISTORY OF OSTEOPOROSIS: ICD-10-CM

## 2022-03-01 DIAGNOSIS — Z79.52 LONG TERM SYSTEMIC STEROID USER: ICD-10-CM

## 2022-03-01 DIAGNOSIS — M54.81 BILATERAL OCCIPITAL NEURALGIA: ICD-10-CM

## 2022-03-01 DIAGNOSIS — E03.8 SECONDARY HYPOTHYROIDISM: ICD-10-CM

## 2022-03-01 PROBLEM — E23.7 PITUITARY LESION: Status: ACTIVE | Noted: 2022-01-23

## 2022-03-01 PROCEDURE — 99214 OFFICE O/P EST MOD 30 MIN: CPT | Performed by: NURSE PRACTITIONER

## 2022-03-01 RX ORDER — DAPAGLIFLOZIN 5 MG/1
5 TABLET, FILM COATED ORAL DAILY
Qty: 14 TABLET | Refills: 0 | COMMUNITY
Start: 2022-03-01 | End: 2022-04-05 | Stop reason: DRUGHIGH

## 2022-03-01 RX ORDER — DAPAGLIFLOZIN 5 MG/1
5 TABLET, FILM COATED ORAL DAILY
Qty: 30 TABLET | Refills: 0 | Status: SHIPPED | OUTPATIENT
Start: 2022-03-01 | End: 2022-03-01 | Stop reason: SDUPTHER

## 2022-03-01 RX ORDER — MONTELUKAST SODIUM 10 MG/1
10 TABLET ORAL NIGHTLY
Qty: 30 TABLET | Refills: 5 | Status: SHIPPED | OUTPATIENT
Start: 2022-03-01 | End: 2022-07-27

## 2022-03-01 RX ORDER — LEVOTHYROXINE AND LIOTHYRONINE 76; 18 UG/1; UG/1
120 TABLET ORAL DAILY
Qty: 30 TABLET | Refills: 5 | Status: SHIPPED | OUTPATIENT
Start: 2022-03-01 | End: 2022-04-05 | Stop reason: SDUPTHER

## 2022-03-01 RX ORDER — FOLIC ACID 1 MG/1
1 TABLET ORAL DAILY
Qty: 90 TABLET | Refills: 3 | Status: SHIPPED | OUTPATIENT
Start: 2022-03-01 | End: 2022-10-17 | Stop reason: SDUPTHER

## 2022-03-01 RX ORDER — METOPROLOL SUCCINATE 50 MG/1
50 TABLET, EXTENDED RELEASE ORAL DAILY
Qty: 30 TABLET | Refills: 5 | Status: SHIPPED | OUTPATIENT
Start: 2022-03-01 | End: 2022-07-27

## 2022-03-01 RX ORDER — LEVONORGESTREL / ETHINYL ESTRADIOL AND ETHINYL ESTRADIOL 150-30(84)
1 KIT ORAL DAILY
Qty: 84 EACH | Refills: 3 | Status: SHIPPED | OUTPATIENT
Start: 2022-03-01 | End: 2023-02-28

## 2022-03-01 RX ORDER — LOSARTAN POTASSIUM 50 MG/1
50 TABLET ORAL DAILY
Qty: 30 TABLET | Refills: 5 | Status: SHIPPED | OUTPATIENT
Start: 2022-03-01 | End: 2022-07-27

## 2022-03-01 RX ORDER — CETIRIZINE HYDROCHLORIDE 10 MG/1
10 TABLET ORAL DAILY
Qty: 30 TABLET | Refills: 5 | Status: SHIPPED | OUTPATIENT
Start: 2022-03-01 | End: 2022-07-27

## 2022-03-01 RX ORDER — MULTIVIT-MIN/IRON/FOLIC ACID/K 18-600-40
2000 CAPSULE ORAL DAILY
Qty: 30 CAPSULE | Refills: 5 | Status: SHIPPED | OUTPATIENT
Start: 2022-03-01 | End: 2022-07-27

## 2022-03-01 NOTE — ASSESSMENT & PLAN NOTE
Patient is stable on NP thyroid 120 mg daily, will continue current dose.  We will plan to recheck thyroid levels in 1 month.

## 2022-03-01 NOTE — PROGRESS NOTES
Chief Complaint  3 month follow up, Hypothyroidism, Hypertension, and Diabetes    Subjective          Beena Osborne presents to [facility]  History of Present Illness   41-year-old female presents today for 3-month follow-up.    Diabetes type 2, non-insulin-dependent: Her last A1c was 7.1%.  She did not tolerate Victoza or Ozempic.  She states Metformin never worked for her.  She is not currently on any medications for diabetes.     Hypothyroidism secondary to disease.  Her last TSH was 27.3, free T4 0.65 3 months ago, in 1 month ago her TSH was 9.8, free T4 0.66. She is currently on NP thyroid 120 mg daily.  She is following with endocrinology but states the endocrinologist does not want to treat her while she is on NP thyroid.  He wants her to switch to synthetic levothyroxine which she does not want to do.  He did tell her that if she is feeling fine on the current dose that it should not be adjusted even though the TSH was elevated.     Hypertension: Her blood pressure is stable.  She is currently on metoprolol 50 mg daily and losartan 50 mg daily.    She states she was diagnosed with occipital neuralgia and she is wanting a referral to a headache specialist Dr. Donna Aparicio at [facility] in [place].    She has never had a mammogram.  Her last Pap smear was in 2020 and was normal.    She has history of rheumatoid arthritis, currently on Humira.  She also has had a history of long-term steroid use.  She also states she just recently got steroid injections in her neck.  She is wanting to get a bone density scan.  She states that she has a strong family history of osteoporosis in which they did a study on her family.    Current Outpatient Medications on File Prior to Visit   Medication Sig Dispense Refill   • Adalimumab (Humira Pen) 40 MG/0.4ML Pen-injector Kit Inject 40 mg under the skin into the appropriate area as directed Every 14 (Fourteen) Days.     • albuterol sulfate  "HFA (Ventolin HFA) 108 (90 Base) MCG/ACT inhaler Inhale 2 puffs Every 4 (Four) Hours As Needed for Wheezing.     • azelastine (ASTELIN) 0.1 % nasal spray      • Iodine Strong, Lugols, (IODINE STRONG PO) Take  by mouth Daily.     • Selenium 200 MCG tablet Take  by mouth Daily.     • Zinc 15 MG capsule Take 15 mg by mouth Daily.     • predniSONE (DELTASONE) 5 MG tablet As Needed.       No current facility-administered medications on file prior to visit.         Objective   Vital Signs:   /88   Pulse 80   Temp 97.8 °F (36.6 °C)   Ht 162.6 cm (64\")   Wt (!) 140 kg (307 lb 12.8 oz)   SpO2 96%   BMI 52.83 kg/m²     Physical Exam  Vitals reviewed.   Constitutional:       Appearance: She is well-developed. She is obese.   Neck:      Thyroid: No thyroid mass, thyromegaly or thyroid tenderness.   Cardiovascular:      Rate and Rhythm: Normal rate and regular rhythm.      Heart sounds: No murmur heard.    No friction rub. No gallop.   Pulmonary:      Effort: Pulmonary effort is normal.      Breath sounds: Normal breath sounds. No wheezing or rhonchi.   Lymphadenopathy:      Cervical: No cervical adenopathy.   Skin:     General: Skin is warm and dry.   Neurological:      Mental Status: She is alert and oriented to person, place, and time.      Cranial Nerves: No cranial nerve deficit.   Psychiatric:         Mood and Affect: Mood and affect normal.         Behavior: Behavior normal.         Thought Content: Thought content normal. Thought content does not include homicidal or suicidal ideation.         Judgment: Judgment normal.        Result Review :                 Assessment and Plan    Diagnoses and all orders for this visit:    1. Type 2 diabetes mellitus with hyperglycemia, without long-term current use of insulin ([facility]) (Primary)  Assessment & Plan:  Diabetes is unchanged.   Medication changes per orders.  I will start her on Farxiga 5 mg daily, discussed potential side effects.  Samples given in office today " and prescription sent to pharmacy.  Patient vies to call if she has any adverse side effects.  Diabetes will be reassessed in 1 month.      2. Secondary hypothyroidism  Assessment & Plan:  Patient is stable on NP thyroid 120 mg daily, will continue current dose.  We will plan to recheck thyroid levels in 1 month.      3. Essential hypertension  Assessment & Plan:  Hypertension is improving with treatment.  Continue current treatment regimen.  Continue current medications.  Blood pressure will be reassessed at the next regular appointment.      4. Bilateral occipital neuralgia  Comments:  Referral to headache neurologist per her request.  Orders:  -     Ambulatory Referral to Neurology    5. Family history of osteoporosis  -     DEXA Bone Density Axial; Future    6. Long term systemic steroid user  Assessment & Plan:  Due to her long-term steroid use and family history of osteoporosis, will order a bone density scan.    Orders:  -     DEXA Bone Density Axial; Future    7. Encounter for screening mammogram for malignant neoplasm of breast  -     Mammo Screening Digital Tomosynthesis Bilateral With CAD; Future    8. Morbid obesity with body mass index (BMI) of 40.0 or higher ([facility])  Assessment & Plan:  Patient's (Body mass index is 52.83 kg/m².) indicates that they are morbidly obese (BMI > 40 or > 35 with obesity - related health condition) with health conditions that include hypertension and diabetes mellitus . Weight is newly identified. BMI is is above average; BMI management plan is completed. We discussed portion control and increasing exercise.       Other orders  -     Discontinue: dapagliflozin (Farxiga) 5 MG tablet tablet; Take 1 tablet by mouth Daily.  Dispense: 30 tablet; Refill: 0  -     Cholecalciferol (Vitamin D) 50 MCG (2000 UT) capsule; Take 1 capsule by mouth Daily.  Dispense: 30 capsule; Refill: 5  -     Thyroid (NP THYROID) 120 MG PO tablet; Take 1 tablet by mouth Daily.  Dispense: 30 tablet; Refill:  5  -     montelukast (SINGULAIR) 10 MG tablet; Take 1 tablet by mouth Every Night.  Dispense: 30 tablet; Refill: 5  -     metoprolol succinate XL (TOPROL-XL) 50 MG 24 hr tablet; Take 1 tablet by mouth Daily.  Dispense: 30 tablet; Refill: 5  -     losartan (COZAAR) 50 MG tablet; Take 1 tablet by mouth Daily.  Dispense: 30 tablet; Refill: 5  -     Jaimiess 0.15-0.03 &0.01 MG tablet; Take 1 tablet by mouth Daily.  Dispense: 84 each; Refill: 3  -     folic acid (FOLVITE) 1 MG tablet; Take 1 tablet by mouth Daily.  Dispense: 90 tablet; Refill: 3  -     cetirizine (zyrTEC) 10 MG tablet; Take 1 tablet by mouth Daily.  Dispense: 30 tablet; Refill: 5  -     dapagliflozin (Farxiga) 5 MG tablet tablet; Take 1 tablet by mouth Daily.  Dispense: 14 tablet; Refill: 0      Follow Up   Return for Annual physical in 3-4  weeks.  Patient was given instructions and counseling regarding her condition or for health maintenance advice. Please see specific information pulled into the AVS if appropriate.

## 2022-03-01 NOTE — ASSESSMENT & PLAN NOTE
Due to her long-term steroid use and family history of osteoporosis, will order a bone density scan.

## 2022-03-01 NOTE — ASSESSMENT & PLAN NOTE
Diabetes is unchanged.   Medication changes per orders.  I will start her on Farxiga 5 mg daily, discussed potential side effects.  Samples given in office today and prescription sent to pharmacy.  Patient vies to call if she has any adverse side effects.  Diabetes will be reassessed in 1 month.

## 2022-03-07 ENCOUNTER — LAB (OUTPATIENT)
Dept: LAB | Facility: HOSPITAL | Age: 42
End: 2022-03-07

## 2022-03-07 ENCOUNTER — TRANSCRIBE ORDERS (OUTPATIENT)
Dept: ADMINISTRATIVE | Facility: HOSPITAL | Age: 42
End: 2022-03-07

## 2022-03-07 DIAGNOSIS — R49.21: Primary | ICD-10-CM

## 2022-03-07 DIAGNOSIS — R49.21: ICD-10-CM

## 2022-03-07 DIAGNOSIS — R29.818: Primary | ICD-10-CM

## 2022-03-07 DIAGNOSIS — R29.818: ICD-10-CM

## 2022-03-07 LAB — CORTIS SERPL-MCNC: 2.03 MCG/DL

## 2022-03-07 PROCEDURE — 36415 COLL VENOUS BLD VENIPUNCTURE: CPT

## 2022-03-07 PROCEDURE — 82533 TOTAL CORTISOL: CPT

## 2022-03-08 ENCOUNTER — TELEPHONE (OUTPATIENT)
Dept: FAMILY MEDICINE CLINIC | Facility: CLINIC | Age: 42
End: 2022-03-08

## 2022-03-08 NOTE — TELEPHONE ENCOUNTER
Left voicemail for patient that the prior authorization for Farxiga 5mg was approved by her insurance.

## 2022-03-10 ENCOUNTER — LAB (OUTPATIENT)
Dept: LAB | Facility: HOSPITAL | Age: 42
End: 2022-03-10

## 2022-03-10 DIAGNOSIS — R29.818 CUSHING PHENOMENON: Primary | ICD-10-CM

## 2022-03-10 PROCEDURE — 82533 TOTAL CORTISOL: CPT

## 2022-03-10 NOTE — PATIENT INSTRUCTIONS
Diabetes Mellitus and Nutrition, Adult  When you have diabetes, or diabetes mellitus, it is very important to have healthy eating habits because your blood sugar (glucose) levels are greatly affected by what you eat and drink. Eating healthy foods in the right amounts, at about the same times every day, can help you:  Control your blood glucose.  Lower your risk of heart disease.  Improve your blood pressure.  Reach or maintain a healthy weight.  What can affect my meal plan?  Every person with diabetes is different, and each person has different needs for a meal plan. Your health care provider may recommend that you work with a dietitian to make a meal plan that is best for you. Your meal plan may vary depending on factors such as:  The calories you need.  The medicines you take.  Your weight.  Your blood glucose, blood pressure, and cholesterol levels.  Your activity level.  Other health conditions you have, such as heart or kidney disease.  How do carbohydrates affect me?  Carbohydrates, also called carbs, affect your blood glucose level more than any other type of food. Eating carbs naturally raises the amount of glucose in your blood. Carb counting is a method for keeping track of how many carbs you eat. Counting carbs is important to keep your blood glucose at a healthy level, especially if you use insulin or take certain oral diabetes medicines.  It is important to know how many carbs you can safely have in each meal. This is different for every person. Your dietitian can help you calculate how many carbs you should have at each meal and for each snack.  How does alcohol affect me?  Alcohol can cause a sudden decrease in blood glucose (hypoglycemia), especially if you use insulin or take certain oral diabetes medicines. Hypoglycemia can be a life-threatening condition. Symptoms of hypoglycemia, such as sleepiness, dizziness, and confusion, are similar to symptoms of having too much alcohol.  Do not drink  "alcohol if:  Your health care provider tells you not to drink.  You are pregnant, may be pregnant, or are planning to become pregnant.  If you drink alcohol:  Do not drink on an empty stomach.  Limit how much you use to:  0-1 drink a day for women.  0-2 drinks a day for men.  Be aware of how much alcohol is in your drink. In the [place] one drink equals one 12 oz bottle of beer (355 mL), one 5 oz glass of wine (148 mL), or one 1½ oz glass of hard liquor (44 mL).  Keep yourself hydrated with water, diet soda, or unsweetened iced tea.  Keep in mind that regular soda, juice, and other mixers may contain a lot of sugar and must be counted as carbs.  What are tips for following this plan?    Reading food labels  Start by checking the serving size on the \"Nutrition Facts\" label of packaged foods and drinks. The amount of calories, carbs, fats, and other nutrients listed on the label is based on one serving of the item. Many items contain more than one serving per package.  Check the total grams (g) of carbs in one serving. You can calculate the number of servings of carbs in one serving by dividing the total carbs by 15. For example, if a food has 30 g of total carbs per serving, it would be equal to 2 servings of carbs.  Check the number of grams (g) of saturated fats and trans fats in one serving. Choose foods that have a low amount or none of these fats.  Check the number of milligrams (mg) of salt (sodium) in one serving. Most people should limit total sodium intake to less than 2,300 mg per day.  Always check the nutrition information of foods labeled as \"low-fat\" or \"nonfat.\" These foods may be higher in added sugar or refined carbs and should be avoided.  Talk to your dietitian to identify your daily goals for nutrients listed on the label.  Shopping  Avoid buying canned, pre-made, or processed foods. These foods tend to be high in fat, sodium, and added sugar.  Shop around the outside edge of the grocery store. This " is where you will most often find fresh fruits and vegetables, bulk grains, fresh meats, and fresh dairy.  Cooking  Use low-heat cooking methods, such as baking, instead of high-heat cooking methods like deep frying.  Cook using healthy oils, such as olive, canola, or sunflower oil.  Avoid cooking with butter, cream, or high-fat meats.  Meal planning  Eat meals and snacks regularly, preferably at the same times every day. Avoid going long periods of time without eating.  Eat foods that are high in fiber, such as fresh fruits, vegetables, beans, and whole grains. Talk with your dietitian about how many servings of carbs you can eat at each meal.  Eat 4-6 oz (112-168 g) of lean protein each day, such as lean meat, chicken, fish, eggs, or tofu. One ounce (oz) of lean protein is equal to:  1 oz (28 g) of meat, chicken, or fish.  1 egg.  ¼ cup (62 g) of tofu.  Eat some foods each day that contain healthy fats, such as avocado, nuts, seeds, and fish.  What foods should I eat?  Fruits  Berries. Apples. Oranges. Peaches. Apricots. Plums. Grapes. [place]. Papaya. Pomegranate. Kiwi. Cherries.  Vegetables  Lettuce. Spinach. Leafy greens, including kale, chard, sherry greens, and mustard greens. Beets. Cauliflower. Cabbage. Broccoli. Carrots. Green beans. Tomatoes. Peppers. Onions. Cucumbers. [place] sprouts.  Grains  Whole grains, such as whole-wheat or whole-grain bread, crackers, tortillas, cereal, and pasta. Unsweetened oatmeal. Quinoa. Brown or wild rice.  Meats and other proteins  Seafood. Poultry without skin. Lean cuts of poultry and beef. Tofu. Nuts. Seeds.  Dairy  Low-fat or fat-free dairy products such as milk, yogurt, and cheese.  The items listed above may not be a complete list of foods and beverages you can eat. Contact a dietitian for more information.  What foods should I avoid?  Fruits  Fruits canned with syrup.  Vegetables  Canned vegetables. Frozen vegetables with butter or cream sauce.  Grains  Refined  white flour and flour products such as bread, pasta, snack foods, and cereals. Avoid all processed foods.  Meats and other proteins  Fatty cuts of meat. Poultry with skin. Breaded or fried meats. Processed meat. Avoid saturated fats.  Dairy  Full-fat yogurt, cheese, or milk.  Beverages  Sweetened drinks, such as soda or iced tea.  The items listed above may not be a complete list of foods and beverages you should avoid. Contact a dietitian for more information.  Questions to ask a health care provider  Do I need to meet with a diabetes educator?  Do I need to meet with a dietitian?  What number can I call if I have questions?  When are the best times to check my blood glucose?  Where to find more information:  American Diabetes Association: diabetes.org  Academy of Nutrition and Dietetics: www.eatright.org  National [place] of Diabetes and Digestive and Kidney Diseases: www.niddk.nih.gov  Association of Diabetes Care and Education Specialists: www.diabeteseducator.org  Summary  It is important to have healthy eating habits because your blood sugar (glucose) levels are greatly affected by what you eat and drink.  A healthy meal plan will help you control your blood glucose and maintain a healthy lifestyle.  Your health care provider may recommend that you work with a dietitian to make a meal plan that is best for you.  Keep in mind that carbohydrates (carbs) and alcohol have immediate effects on your blood glucose levels. It is important to count carbs and to use alcohol carefully.  This information is not intended to replace advice given to you by your health care provider. Make sure you discuss any questions you have with your health care provider.  Document Revised: 11/24/2020 Document Reviewed: 11/24/2020  Elsevier Patient Education © 2021 Elsevier Inc.  Healthy Eating  Following a healthy eating pattern may help you to achieve and maintain a healthy body weight, reduce the risk of chronic disease, and live a  "long and productive life. It is important to follow a healthy eating pattern at an appropriate calorie level for your body. Your nutritional needs should be met primarily through food by choosing a variety of nutrient-rich foods.  What are tips for following this plan?  Reading food labels  Read labels and choose the following:  Reduced or low sodium.  Juices with 100% fruit juice.  Foods with low saturated fats and high polyunsaturated and monounsaturated fats.  Foods with whole grains, such as whole wheat, cracked wheat, brown rice, and wild rice.  Whole grains that are fortified with folic acid. This is recommended for women who are pregnant or who want to become pregnant.  Read labels and avoid the following:  Foods with a lot of added sugars. These include foods that contain brown sugar, corn sweetener, corn syrup, dextrose, fructose, glucose, high-fructose corn syrup, honey, invert sugar, lactose, malt syrup, maltose, molasses, raw sugar, sucrose, trehalose, or turbinado sugar.  Do not eat more than the following amounts of added sugar per day:  6 teaspoons (25 g) for women.  9 teaspoons (38 g) for men.  Foods that contain processed or refined starches and grains.  Refined grain products, such as white flour, degermed cornmeal, white bread, and white rice.  Shopping  Choose nutrient-rich snacks, such as vegetables, whole fruits, and nuts. Avoid high-calorie and high-sugar snacks, such as potato chips, fruit snacks, and candy.  Use oil-based dressings and spreads on foods instead of solid fats such as butter, stick margarine, or cream cheese.  Limit pre-made sauces, mixes, and \"instant\" products such as flavored rice, instant noodles, and ready-made pasta.  Try more plant-protein sources, such as tofu, tempeh, black beans, edamame, lentils, nuts, and seeds.  Explore eating plans such as the Mediterranean diet or vegetarian diet.  Cooking  Use oil to sauté or stir-johns foods instead of solid fats such as butter, " stick margarine, or lard.  Try baking, boiling, grilling, or broiling instead of frying.  Remove the fatty part of meats before cooking.  Steam vegetables in water or broth.  Meal planning    At meals, imagine dividing your plate into fourths:  One-half of your plate is fruits and vegetables.  One-fourth of your plate is whole grains.  One-fourth of your plate is protein, especially lean meats, poultry, eggs, tofu, beans, or nuts.  Include low-fat dairy as part of your daily diet.    Lifestyle  Choose healthy options in all settings, including home, work, school, restaurants, or stores.  Prepare your food safely:  Wash your hands after handling raw meats.  Keep food preparation surfaces clean by regularly washing with hot, soapy water.  Keep raw meats separate from ready-to-eat foods, such as fruits and vegetables.  , meat, poultry, and eggs to the recommended internal temperature.  Store foods at safe temperatures. In general:  Keep cold foods at 40°F (4.4°C) or below.  Keep hot foods at 140°F (60°C) or above.  Keep your freezer at 0°F (-17.8°C) or below.  Foods are no longer safe to eat when they have been between the temperatures of 40°-140°F (4.4-60°C) for more than 2 hours.  What foods should I eat?  Fruits  Aim to eat 2 cup-equivalents of fresh, canned (in natural juice), or frozen fruits each day. Examples of 1 cup-equivalent of fruit include 1 small apple, 8 large strawberries, 1 cup canned fruit, ½ cup dried fruit, or 1 cup 100% juice.  Vegetables  Aim to eat 2½-3 cup-equivalents of fresh and frozen vegetables each day, including different varieties and colors. Examples of 1 cup-equivalent of vegetables include 2 medium carrots, 2 cups raw, leafy greens, 1 cup chopped vegetable (raw or cooked), or 1 medium baked potato.  Grains  Aim to eat 6 ounce-equivalents of whole grains each day. Examples of 1 ounce-equivalent of grains include 1 slice of bread, 1 cup ready-to-eat cereal, 3 cups popcorn, or  ½ cup cooked rice, pasta, or cereal.  Meats and other proteins  Aim to eat 5-6 ounce-equivalents of protein each day. Examples of 1 ounce-equivalent of protein include 1 egg, 1/2 cup nuts or seeds, or 1 tablespoon (16 g) peanut butter. A cut of meat or fish that is the size of a deck of cards is about 3-4 ounce-equivalents.  Of the protein you eat each week, try to have at least 8 ounces come from seafood. This includes salmon, trout, herring, and anchovies.  Dairy  Aim to eat 3 cup-equivalents of fat-free or low-fat dairy each day. Examples of 1 cup-equivalent of dairy include 1 cup (240 mL) milk, 8 ounces (250 g) yogurt, 1½ ounces (44 g) natural cheese, or 1 cup (240 mL) fortified soy milk.  Fats and oils  Aim for about 5 teaspoons (21 g) per day. Choose monounsaturated fats, such as canola and olive oils, avocados, peanut butter, and most nuts, or polyunsaturated fats, such as sunflower, corn, and soybean oils, walnuts, pine nuts, sesame seeds, sunflower seeds, and flaxseed.  Beverages  Aim for six 8-oz glasses of water per day. Limit coffee to three to five 8-oz cups per day.  Limit caffeinated beverages that have added calories, such as soda and energy drinks.  Limit alcohol intake to no more than 1 drink a day for nonpregnant women and 2 drinks a day for men. One drink equals 12 oz of beer (355 mL), 5 oz of wine (148 mL), or 1½ oz of hard liquor (44 mL).  Seasoning and other foods  Avoid adding excess amounts of salt to your foods. Try flavoring foods with herbs and spices instead of salt.  Avoid adding sugar to foods.  Try using oil-based dressings, sauces, and spreads instead of solid fats.  This information is based on general [place] nutrition guidelines. For more information, visit choosemyplate.gov. Exact amounts may vary based on your nutrition needs.  Summary  A healthy eating plan may help you to maintain a healthy weight, reduce the risk of chronic diseases, and stay active throughout your life.  Plan  your meals. Make sure you eat the right portions of a variety of nutrient-rich foods.  Try baking, boiling, grilling, or broiling instead of frying.  Choose healthy options in all settings, including home, work, school, restaurants, or stores.  This information is not intended to replace advice given to you by your health care provider. Make sure you discuss any questions you have with your health care provider.  Document Revised: 04/01/2019 Document Reviewed: 04/01/2019  Elsevier Patient Education © 2021 Elsevier Inc.

## 2022-03-10 NOTE — ASSESSMENT & PLAN NOTE
Patient's (Body mass index is 52.83 kg/m².) indicates that they are morbidly obese (BMI > 40 or > 35 with obesity - related health condition) with health conditions that include hypertension and diabetes mellitus . Weight is newly identified. BMI is is above average; BMI management plan is completed. We discussed portion control and increasing exercise.

## 2022-03-15 LAB — CORTIS SAL-MCNC: <0.01 UG/DL

## 2022-04-04 ENCOUNTER — OFFICE VISIT (OUTPATIENT)
Dept: FAMILY MEDICINE CLINIC | Facility: CLINIC | Age: 42
End: 2022-04-04

## 2022-04-04 VITALS
SYSTOLIC BLOOD PRESSURE: 124 MMHG | OXYGEN SATURATION: 97 % | HEART RATE: 103 BPM | DIASTOLIC BLOOD PRESSURE: 88 MMHG | BODY MASS INDEX: 50.02 KG/M2 | WEIGHT: 293 LBS | HEIGHT: 64 IN | TEMPERATURE: 98.9 F

## 2022-04-04 DIAGNOSIS — E53.8 B12 DEFICIENCY: ICD-10-CM

## 2022-04-04 DIAGNOSIS — E55.9 VITAMIN D DEFICIENCY: ICD-10-CM

## 2022-04-04 DIAGNOSIS — Z11.59 NEED FOR HEPATITIS C SCREENING TEST: ICD-10-CM

## 2022-04-04 DIAGNOSIS — Z00.00 ANNUAL PHYSICAL EXAM: Primary | ICD-10-CM

## 2022-04-04 DIAGNOSIS — R25.2 MUSCLE CRAMPS: ICD-10-CM

## 2022-04-04 DIAGNOSIS — J01.40 ACUTE NON-RECURRENT PANSINUSITIS: ICD-10-CM

## 2022-04-04 DIAGNOSIS — E11.65 TYPE 2 DIABETES MELLITUS WITH HYPERGLYCEMIA, WITHOUT LONG-TERM CURRENT USE OF INSULIN: ICD-10-CM

## 2022-04-04 DIAGNOSIS — E03.8 SECONDARY HYPOTHYROIDISM: ICD-10-CM

## 2022-04-04 LAB
25(OH)D3 SERPL-MCNC: 49.6 NG/ML (ref 30–100)
ALBUMIN SERPL-MCNC: 4.2 G/DL (ref 3.5–5.2)
ALBUMIN/GLOB SERPL: 1.3 G/DL
ALP SERPL-CCNC: 64 U/L (ref 39–117)
ALT SERPL W P-5'-P-CCNC: 26 U/L (ref 1–33)
ANION GAP SERPL CALCULATED.3IONS-SCNC: 13.2 MMOL/L (ref 5–15)
AST SERPL-CCNC: 32 U/L (ref 1–32)
BILIRUB SERPL-MCNC: 0.2 MG/DL (ref 0–1.2)
BUN SERPL-MCNC: 8 MG/DL (ref 6–20)
BUN/CREAT SERPL: 9.3 (ref 7–25)
CALCIUM SPEC-SCNC: 10 MG/DL (ref 8.6–10.5)
CHLORIDE SERPL-SCNC: 100 MMOL/L (ref 98–107)
CHOLEST SERPL-MCNC: 209 MG/DL (ref 0–200)
CO2 SERPL-SCNC: 25.8 MMOL/L (ref 22–29)
CREAT SERPL-MCNC: 0.86 MG/DL (ref 0.57–1)
EGFRCR SERPLBLD CKD-EPI 2021: 87.7 ML/MIN/1.73
FOLATE SERPL-MCNC: 11.1 NG/ML (ref 4.78–24.2)
GLOBULIN UR ELPH-MCNC: 3.3 GM/DL
GLUCOSE SERPL-MCNC: 173 MG/DL (ref 65–99)
HBA1C MFR BLD: 7.9 % (ref 4.8–5.6)
HCV AB SER DONR QL: NORMAL
HDLC SERPL-MCNC: 51 MG/DL (ref 40–60)
LDLC SERPL CALC-MCNC: 140 MG/DL (ref 0–100)
LDLC/HDLC SERPL: 2.7 {RATIO}
MAGNESIUM SERPL-MCNC: 1.9 MG/DL (ref 1.6–2.6)
POTASSIUM SERPL-SCNC: 4.4 MMOL/L (ref 3.5–5.2)
PROT SERPL-MCNC: 7.5 G/DL (ref 6–8.5)
SODIUM SERPL-SCNC: 139 MMOL/L (ref 136–145)
T4 FREE SERPL-MCNC: 0.72 NG/DL (ref 0.93–1.7)
TRIGL SERPL-MCNC: 102 MG/DL (ref 0–150)
TSH SERPL DL<=0.05 MIU/L-ACNC: 35.3 UIU/ML (ref 0.27–4.2)
VIT B12 BLD-MCNC: 462 PG/ML (ref 211–946)
VLDLC SERPL-MCNC: 18 MG/DL (ref 5–40)

## 2022-04-04 PROCEDURE — 84439 ASSAY OF FREE THYROXINE: CPT | Performed by: NURSE PRACTITIONER

## 2022-04-04 PROCEDURE — 82746 ASSAY OF FOLIC ACID SERUM: CPT | Performed by: NURSE PRACTITIONER

## 2022-04-04 PROCEDURE — 99396 PREV VISIT EST AGE 40-64: CPT | Performed by: NURSE PRACTITIONER

## 2022-04-04 PROCEDURE — 83036 HEMOGLOBIN GLYCOSYLATED A1C: CPT | Performed by: NURSE PRACTITIONER

## 2022-04-04 PROCEDURE — 82607 VITAMIN B-12: CPT | Performed by: NURSE PRACTITIONER

## 2022-04-04 PROCEDURE — 82306 VITAMIN D 25 HYDROXY: CPT | Performed by: NURSE PRACTITIONER

## 2022-04-04 PROCEDURE — 80061 LIPID PANEL: CPT | Performed by: NURSE PRACTITIONER

## 2022-04-04 PROCEDURE — 80053 COMPREHEN METABOLIC PANEL: CPT | Performed by: NURSE PRACTITIONER

## 2022-04-04 PROCEDURE — 84443 ASSAY THYROID STIM HORMONE: CPT | Performed by: NURSE PRACTITIONER

## 2022-04-04 PROCEDURE — 83735 ASSAY OF MAGNESIUM: CPT | Performed by: NURSE PRACTITIONER

## 2022-04-04 PROCEDURE — 86803 HEPATITIS C AB TEST: CPT | Performed by: NURSE PRACTITIONER

## 2022-04-04 RX ORDER — AMOXICILLIN 875 MG/1
875 TABLET, COATED ORAL 2 TIMES DAILY
Qty: 20 TABLET | Refills: 0 | Status: SHIPPED | OUTPATIENT
Start: 2022-04-04 | End: 2022-04-14

## 2022-04-04 RX ORDER — FLUCONAZOLE 150 MG/1
TABLET ORAL
Qty: 2 TABLET | Refills: 0 | Status: SHIPPED | OUTPATIENT
Start: 2022-04-04 | End: 2022-10-17

## 2022-04-04 NOTE — PROGRESS NOTES
Chief Complaint  Annual Exam    Subjective          Beena Osborne presents to [facility]  History of Present Illness   41-year-old female presents today for an annual physical.  Her last Pap smear was in 2020, she follows with gynecology.    She also is complaining of a sinus infection for the past 3 weeks.  She complains of pain and pressure in her face.    Diabetes type 2, non-insulin-dependent: She was started on Farxiga last month and states she is tolerating it well.  Her last A1c was 7.1% in November.    Hypothyroidism: Currently on NP thyroid 120 mg daily, her last thyroid levels were elevated.    She has vitamin B12 deficiency and folate deficiency: She is taking B12 and folate.    Vitamin D deficiency: She is currently taking vitamin D 2000 units daily.    She is complaining of some muscle cramps and would like to have her potassium and magnesium levels checked today.    Current Outpatient Medications on File Prior to Visit   Medication Sig Dispense Refill   • Adalimumab (Humira Pen) 40 MG/0.4ML Pen-injector Kit Inject 40 mg under the skin into the appropriate area as directed Every 14 (Fourteen) Days.     • albuterol sulfate  (90 Base) MCG/ACT inhaler Inhale 2 puffs Every 4 (Four) Hours As Needed for Wheezing.     • azelastine (ASTELIN) 0.1 % nasal spray      • cetirizine (zyrTEC) 10 MG tablet Take 1 tablet by mouth Daily. 30 tablet 5   • Cholecalciferol (Vitamin D) 50 MCG (2000 UT) capsule Take 1 capsule by mouth Daily. 30 capsule 5   • dapagliflozin (Farxiga) 5 MG tablet tablet Take 1 tablet by mouth Daily. 14 tablet 0   • folic acid (FOLVITE) 1 MG tablet Take 1 tablet by mouth Daily. 90 tablet 3   • Iodine Strong, Lug[place], (IODINE STRONG PO) Take  by mouth Daily.     • Jaimiess 0.15-0.03 &0.01 MG tablet Take 1 tablet by mouth Daily. 84 each 3   • losartan (COZAAR) 50 MG tablet Take 1 tablet by mouth Daily. 30 tablet 5   • metoprolol succinate XL (TOPROL-XL)  "50 MG 24 hr tablet Take 1 tablet by mouth Daily. 30 tablet 5   • montelukast (SINGULAIR) 10 MG tablet Take 1 tablet by mouth Every Night. 30 tablet 5   • predniSONE (DELTASONE) 5 MG tablet As Needed.     • Selenium 200 MCG tablet Take  by mouth Daily.     • Thyroid (NP THYROID) 120 MG PO tablet Take 1 tablet by mouth Daily. 30 tablet 5   • Zinc 15 MG capsule Take 15 mg by mouth Daily.       No current facility-administered medications on file prior to visit.       Objective   Vital Signs:   /88   Pulse 103   Temp 98.9 °F (37.2 °C)   Ht 162.6 cm (64\")   Wt (!) 140 kg (309 lb 9.6 oz)   SpO2 97%   BMI 53.14 kg/m²     Physical Exam  Vitals reviewed.   Constitutional:       Appearance: Normal appearance. She is well-developed.   HENT:      Right Ear: Tympanic membrane, ear canal and external ear normal.      Left Ear: Tympanic membrane, ear canal and external ear normal.      Nose:      Right Sinus: Maxillary sinus tenderness and frontal sinus tenderness present.      Left Sinus: Maxillary sinus tenderness and frontal sinus tenderness present.      Mouth/Throat:      Mouth: Mucous membranes are moist.      Pharynx: No pharyngeal swelling, oropharyngeal exudate or posterior oropharyngeal erythema.   Neck:      Thyroid: No thyroid mass, thyromegaly or thyroid tenderness.   Cardiovascular:      Rate and Rhythm: Normal rate and regular rhythm.      Heart sounds: No murmur heard.    No friction rub. No gallop.   Pulmonary:      Effort: Pulmonary effort is normal.      Breath sounds: Normal breath sounds. No wheezing or rhonchi.   Lymphadenopathy:      Cervical: No cervical adenopathy.   Skin:     General: Skin is warm and dry.   Neurological:      Mental Status: She is alert and oriented to person, place, and time.      Cranial Nerves: No cranial nerve deficit.   Psychiatric:         Mood and Affect: Mood and affect normal.         Behavior: Behavior normal.         Thought Content: Thought content normal. Thought " content does not include homicidal or suicidal ideation.         Judgment: Judgment normal.        Result Review :     CMP    CMP 7/1/21 11/23/21 1/24/22   Glucose 126 (A) 130 (A)    BUN 10 7    Creatinine 0.79 0.77    eGFR Non African Am 81 83 >60   eGFR African Am   >60   Sodium 137 139    Potassium 4.2 4.6    Chloride 102 103    Calcium 9.3 9.7    Albumin 4.00 4.00    Total Bilirubin 0.2 0.2    Alkaline Phosphatase 57 57    AST (SGOT) 12 25    ALT (SGPT) 12 23    (A) Abnormal value            CBC    CBC 7/1/21   WBC 12.69 (A)   RBC 4.57   Hemoglobin 13.5   Hematocrit 40.6   MCV 88.8   MCH 29.5   MCHC 33.3   RDW 13.4   Platelets 451 (A)   (A) Abnormal value            Lipid Panel    Lipid Panel 7/1/21 11/23/21   Total Cholesterol 199 179   Triglycerides 148 121   HDL Cholesterol 57 54   VLDL Cholesterol 26 22   LDL Cholesterol  116 (A) 103 (A)   LDL/HDL Ratio 1.97 1.87   (A) Abnormal value            TSH    TSH 7/1/21 11/23/21 1/5/22   TSH 15.000 (A) 27.300 (A) 9.820 (A)   (A) Abnormal value            A1C Last 3 Results    HGBA1C Last 3 Results 7/1/21 11/23/21   Hemoglobin A1C 7.00 (A) 7.10 (A)   (A) Abnormal value            Microalbumin    Microalbumin 11/23/21   Microalbumin, Urine 64.7                     Assessment and Plan    Diagnoses and all orders for this visit:    1. Annual physical exam (Primary)  Comments:  We discussed hepatitis C screening, need for pneumococcal 23 vaccine but not available today so she will get it on her next visit.  Advised to schedule Pap apt.    2. Type 2 diabetes mellitus with hyperglycemia, without long-term current use of insulin (HCC)  Assessment & Plan:  Diabetes is improving with treatment.   Continue current treatment regimen.  Reminded to get yearly retinal exam.  Diabetes will be reassessed in 3 months.    Orders:  -     Hemoglobin A1c  -     Comprehensive Metabolic Panel  -     Lipid Panel    3. Secondary hypothyroidism  Assessment & Plan:  We will recheck thyroid  levels today, patient will continue NP thyroid as listed.    Orders:  -     TSH+Free T4    4. B12 deficiency  -     Vitamin B12 & Folate    5. Muscle cramps  Comments:  We will check CMP and magnesium today with her labs.  Orders:  -     Comprehensive Metabolic Panel  -     Magnesium    6. Vitamin D deficiency  Assessment & Plan:  We will check vitamin D level today with her labs, patient will continue vitamin D.    Orders:  -     Vitamin D 25 Hydroxy    7. Acute non-recurrent pansinusitis  Comments:  I will start her on amoxicillin, Diflucan given due to complaints of yeast infections with antibiotics.    8. Need for hepatitis C screening test  -     Hepatitis C Antibody    Other orders  -     Discontinue: pneumococcal polysaccharide 23-valent (PNEUMOVAX-23) vaccine 0.5 mL  -     amoxicillin (AMOXIL) 875 MG tablet; Take 1 tablet by mouth 2 (Two) Times a Day for 10 days.  Dispense: 20 tablet; Refill: 0  -     fluconazole (Diflucan) 150 MG tablet; One immediately and repeat dose in 72 hours  Dispense: 2 tablet; Refill: 0      Follow Up   Return in about 3 months (around 7/4/2022) for Next scheduled follow up diabetes.  Patient was given instructions and counseling regarding her condition or for health maintenance advice. Please see specific information pulled into the AVS if appropriate.

## 2022-04-05 ENCOUNTER — TELEPHONE (OUTPATIENT)
Dept: FAMILY MEDICINE CLINIC | Facility: CLINIC | Age: 42
End: 2022-04-05

## 2022-04-05 RX ORDER — ATORVASTATIN CALCIUM 20 MG/1
20 TABLET, FILM COATED ORAL NIGHTLY
Qty: 30 TABLET | Refills: 3 | Status: SHIPPED | OUTPATIENT
Start: 2022-04-05 | End: 2022-07-08 | Stop reason: SDUPTHER

## 2022-04-05 RX ORDER — LEVOTHYROXINE AND LIOTHYRONINE 76; 18 UG/1; UG/1
TABLET ORAL
Qty: 30 TABLET | Refills: 1 | Status: SHIPPED | OUTPATIENT
Start: 2022-04-05 | End: 2022-07-27

## 2022-04-05 RX ORDER — DAPAGLIFLOZIN 10 MG/1
10 TABLET, FILM COATED ORAL DAILY
Qty: 30 TABLET | Refills: 5 | Status: SHIPPED | OUTPATIENT
Start: 2022-04-05 | End: 2022-10-17

## 2022-04-05 RX ORDER — LEVOTHYROXINE AND LIOTHYRONINE 9.5; 2.25 UG/1; UG/1
15 TABLET ORAL
Qty: 30 TABLET | Refills: 1 | Status: SHIPPED | OUTPATIENT
Start: 2022-04-05 | End: 2022-05-31

## 2022-04-05 NOTE — TELEPHONE ENCOUNTER
Caller: [facility], [facility] - [address] - [phone]  - [phone] FX    Relationship to patient: Pharmacy    Best call back number: [phone]    Patient is needing: Thyroid 130 MG PO tablet  IS NO LONGER AVAILABLE, TO KEEP IT AT 1 TABLET PER DAY IT WOULD NEED TO BE INCREASED  MG, THE OTHER OPTION IS 2 TABLETS PER DAY, 120 MG AND 15 MG.  PLEASE CONTACT PHARMACY AND ADVISE

## 2022-04-05 NOTE — TELEPHONE ENCOUNTER
I sent in a new prescription for 120 mg and a 15 mg tablet.  Please let patient know that she will have to take both tablets together first thing in the morning on an empty stomach at least an hour before eating or taking any other medications.

## 2022-05-16 ENCOUNTER — HOSPITAL ENCOUNTER (OUTPATIENT)
Dept: BONE DENSITY | Facility: HOSPITAL | Age: 42
Discharge: HOME | End: 2022-05-16

## 2022-05-16 ENCOUNTER — HOSPITAL ENCOUNTER (OUTPATIENT)
Dept: MAMMOGRAPHY | Facility: HOSPITAL | Age: 42
Discharge: HOME | End: 2022-05-16

## 2022-05-16 ENCOUNTER — TRANSCRIBE ORDERS (OUTPATIENT)
Dept: LAB | Facility: HOSPITAL | Age: 42
End: 2022-05-16

## 2022-05-16 DIAGNOSIS — Z12.31 ENCOUNTER FOR SCREENING MAMMOGRAM FOR MALIGNANT NEOPLASM OF BREAST: ICD-10-CM

## 2022-05-16 DIAGNOSIS — Z79.52 LONG TERM SYSTEMIC STEROID USER: ICD-10-CM

## 2022-05-16 DIAGNOSIS — R29.818 CUSHING PHENOMENON: Primary | ICD-10-CM

## 2022-05-16 DIAGNOSIS — Z82.62 FAMILY HISTORY OF OSTEOPOROSIS: ICD-10-CM

## 2022-05-16 DIAGNOSIS — R92.8 ABNORMAL MAMMOGRAM OF RIGHT BREAST: Primary | ICD-10-CM

## 2022-05-16 PROCEDURE — 77063 BREAST TOMOSYNTHESIS BI: CPT

## 2022-05-16 PROCEDURE — 77067 SCR MAMMO BI INCL CAD: CPT

## 2022-05-16 PROCEDURE — 77080 DXA BONE DENSITY AXIAL: CPT

## 2022-05-31 RX ORDER — LEVOTHYROXINE, LIOTHYRONINE 9.5; 2.25 UG/1; UG/1
TABLET ORAL
Qty: 30 TABLET | Refills: 1 | Status: SHIPPED | OUTPATIENT
Start: 2022-05-31 | End: 2022-07-27

## 2022-06-13 ENCOUNTER — LAB (OUTPATIENT)
Dept: LAB | Facility: HOSPITAL | Age: 42
End: 2022-06-13

## 2022-06-13 DIAGNOSIS — R29.818 CUSHING PHENOMENON: ICD-10-CM

## 2022-06-13 PROCEDURE — 82530 CORTISOL FREE: CPT

## 2022-06-13 PROCEDURE — 81050 URINALYSIS VOLUME MEASURE: CPT

## 2022-06-16 LAB
CORTIS F 24H UR-MCNC: 17 UG/L
CORTIS F 24H UR-MRATE: NORMAL UG/24 HR (ref 6–42)

## 2022-07-05 ENCOUNTER — HOSPITAL ENCOUNTER (OUTPATIENT)
Dept: MAMMOGRAPHY | Facility: HOSPITAL | Age: 42
Discharge: HOME | End: 2022-07-05

## 2022-07-05 ENCOUNTER — HOSPITAL ENCOUNTER (OUTPATIENT)
Dept: ULTRASOUND IMAGING | Facility: HOSPITAL | Age: 42
Discharge: HOME | End: 2022-07-05

## 2022-07-05 DIAGNOSIS — R92.8 ABNORMAL MAMMOGRAM OF RIGHT BREAST: ICD-10-CM

## 2022-07-05 PROCEDURE — G0279 TOMOSYNTHESIS, MAMMO: HCPCS

## 2022-07-05 PROCEDURE — 77065 DX MAMMO INCL CAD UNI: CPT

## 2022-07-07 ENCOUNTER — OFFICE VISIT (OUTPATIENT)
Dept: FAMILY MEDICINE CLINIC | Facility: CLINIC | Age: 42
End: 2022-07-07

## 2022-07-07 VITALS
DIASTOLIC BLOOD PRESSURE: 82 MMHG | WEIGHT: 293 LBS | OXYGEN SATURATION: 93 % | HEIGHT: 64 IN | BODY MASS INDEX: 50.02 KG/M2 | TEMPERATURE: 98.2 F | SYSTOLIC BLOOD PRESSURE: 144 MMHG | HEART RATE: 104 BPM

## 2022-07-07 DIAGNOSIS — Z09 FOLLOW-UP EXAM, 3-6 MONTHS SINCE PREVIOUS EXAM: Primary | ICD-10-CM

## 2022-07-07 DIAGNOSIS — E78.2 MIXED HYPERLIPIDEMIA: ICD-10-CM

## 2022-07-07 DIAGNOSIS — I10 ESSENTIAL HYPERTENSION: ICD-10-CM

## 2022-07-07 DIAGNOSIS — E11.65 UNCONTROLLED TYPE 2 DIABETES MELLITUS WITH HYPERGLYCEMIA: ICD-10-CM

## 2022-07-07 LAB
ALBUMIN SERPL-MCNC: 4.3 G/DL (ref 3.5–5.2)
ALBUMIN UR-MCNC: 45.9 MG/DL
ALBUMIN/GLOB SERPL: 1.3 G/DL
ALP SERPL-CCNC: 85 U/L (ref 39–117)
ALT SERPL W P-5'-P-CCNC: 57 U/L (ref 1–33)
ANION GAP SERPL CALCULATED.3IONS-SCNC: 11.3 MMOL/L (ref 5–15)
AST SERPL-CCNC: 79 U/L (ref 1–32)
BASOPHILS # BLD AUTO: 0.11 10*3/MM3 (ref 0–0.2)
BASOPHILS NFR BLD AUTO: 1.2 % (ref 0–1.5)
BILIRUB SERPL-MCNC: 0.5 MG/DL (ref 0–1.2)
BUN SERPL-MCNC: 6 MG/DL (ref 6–20)
BUN/CREAT SERPL: 8.1 (ref 7–25)
CALCIUM SPEC-SCNC: 9.6 MG/DL (ref 8.6–10.5)
CHLORIDE SERPL-SCNC: 97 MMOL/L (ref 98–107)
CHOLEST SERPL-MCNC: 214 MG/DL (ref 0–200)
CO2 SERPL-SCNC: 29.7 MMOL/L (ref 22–29)
CREAT SERPL-MCNC: 0.74 MG/DL (ref 0.57–1)
DEPRECATED RDW RBC AUTO: 40.3 FL (ref 37–54)
EGFRCR SERPLBLD CKD-EPI 2021: 105 ML/MIN/1.73
EOSINOPHIL # BLD AUTO: 0.8 10*3/MM3 (ref 0–0.4)
EOSINOPHIL NFR BLD AUTO: 8.8 % (ref 0.3–6.2)
ERYTHROCYTE [DISTWIDTH] IN BLOOD BY AUTOMATED COUNT: 12.3 % (ref 12.3–15.4)
GLOBULIN UR ELPH-MCNC: 3.3 GM/DL
GLUCOSE SERPL-MCNC: 172 MG/DL (ref 65–99)
HBA1C MFR BLD: 8.8 % (ref 4.8–5.6)
HCT VFR BLD AUTO: 44.1 % (ref 34–46.6)
HDLC SERPL-MCNC: 49 MG/DL (ref 40–60)
HGB BLD-MCNC: 14.3 G/DL (ref 12–15.9)
IMM GRANULOCYTES # BLD AUTO: 0.06 10*3/MM3 (ref 0–0.05)
IMM GRANULOCYTES NFR BLD AUTO: 0.7 % (ref 0–0.5)
LDLC SERPL CALC-MCNC: 141 MG/DL (ref 0–100)
LDLC/HDLC SERPL: 2.82 {RATIO}
LYMPHOCYTES # BLD AUTO: 3.41 10*3/MM3 (ref 0.7–3.1)
LYMPHOCYTES NFR BLD AUTO: 37.3 % (ref 19.6–45.3)
MCH RBC QN AUTO: 29.3 PG (ref 26.6–33)
MCHC RBC AUTO-ENTMCNC: 32.4 G/DL (ref 31.5–35.7)
MCV RBC AUTO: 90.4 FL (ref 79–97)
MONOCYTES # BLD AUTO: 0.58 10*3/MM3 (ref 0.1–0.9)
MONOCYTES NFR BLD AUTO: 6.3 % (ref 5–12)
NEUTROPHILS NFR BLD AUTO: 4.18 10*3/MM3 (ref 1.7–7)
NEUTROPHILS NFR BLD AUTO: 45.7 % (ref 42.7–76)
NRBC BLD AUTO-RTO: 0 /100 WBC (ref 0–0.2)
PLATELET # BLD AUTO: 459 10*3/MM3 (ref 140–450)
PMV BLD AUTO: 11.1 FL (ref 6–12)
POTASSIUM SERPL-SCNC: 4.3 MMOL/L (ref 3.5–5.2)
PROT SERPL-MCNC: 7.6 G/DL (ref 6–8.5)
RBC # BLD AUTO: 4.88 10*6/MM3 (ref 3.77–5.28)
SODIUM SERPL-SCNC: 138 MMOL/L (ref 136–145)
TRIGL SERPL-MCNC: 133 MG/DL (ref 0–150)
TSH SERPL DL<=0.05 MIU/L-ACNC: 5.43 UIU/ML (ref 0.27–4.2)
VLDLC SERPL-MCNC: 24 MG/DL (ref 5–40)
WBC NRBC COR # BLD: 9.14 10*3/MM3 (ref 3.4–10.8)

## 2022-07-07 PROCEDURE — 99214 OFFICE O/P EST MOD 30 MIN: CPT | Performed by: NURSE PRACTITIONER

## 2022-07-07 PROCEDURE — 80053 COMPREHEN METABOLIC PANEL: CPT | Performed by: NURSE PRACTITIONER

## 2022-07-07 PROCEDURE — 80061 LIPID PANEL: CPT | Performed by: NURSE PRACTITIONER

## 2022-07-07 PROCEDURE — 82043 UR ALBUMIN QUANTITATIVE: CPT | Performed by: NURSE PRACTITIONER

## 2022-07-07 PROCEDURE — 83036 HEMOGLOBIN GLYCOSYLATED A1C: CPT | Performed by: NURSE PRACTITIONER

## 2022-07-07 PROCEDURE — 84443 ASSAY THYROID STIM HORMONE: CPT | Performed by: NURSE PRACTITIONER

## 2022-07-07 PROCEDURE — 85025 COMPLETE CBC W/AUTO DIFF WBC: CPT | Performed by: NURSE PRACTITIONER

## 2022-07-07 NOTE — PROGRESS NOTES
Chief Complaint  Establish Care (Transfer of care from Brandi HAILE), 3 month follow up, and Diabetes    Subjective          Medical History: has a past medical history of ADHD (attention deficit hyperactivity disorder), Allergic, Allergic rhinitis (07/10/2015), Anemia, Anxiety, Asthma, B12 deficiency, Depression, Difficulty concentrating (07/15/2016), Graves disease (07/10/2015), HLD (hyperlipidemia) (07/10/2015), Hyperlipidemia LDL goal <100 (7/10/2015), Hypertension, Hyperthyroidism, Inappropriate sinus tachycardia (10/7/2021), Low back pain, Morbid obesity ([facility]), PCOS (polycystic ovarian syndrome), Pneumonia, Primary insomnia (10/25/2015), Psoriasis (07/10/2015), RA (rheumatoid arthritis) ([facility]), 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     Surgical History: has a past surgical history that includes Appendectomy (1984); Cyst Removal; Incision and Drainage Abscess; Ovarian cyst removal (1999); Septoplasty (2012); Sinus surgery (2012); and Other surgical history.     Family History: family history includes Alcohol abuse in her maternal grandfather, maternal uncle, paternal uncle, and sister; Arrhythmia in her father; COPD in her father; Cancer in her father; Hypothyroidism in her father and mother; Lung cancer in her paternal grandmother; Mental illness in her brother, maternal grandfather, maternal grandmother, mother, paternal uncle, sister, and sister; Osteopenia in her mother; Osteoporosis in her father; Other in her mother; Stomach cancer in her maternal grandfather; Testicular cancer in her brother.     Social History: reports that she has never smoked. She has never used smokeless tobacco. She reports previous alcohol use. She reports that she does not use drugs.    Beena Osborne presents to [facility]  Patient is a 41-year-old female who was previously seen by Brandi Foote nurse practitioner.  She comes in  today to establish care with me .  Patient has multiple comorbidities including Graves' disease, type 2 diabetes with hyperglycemia, PCOS, rheumatoid and psoriatic arthritis, Cushing's disease.  She she is seen multiple specialists at [facility] including neurology, endocrinology, and rheumatology.  She comes in today to establish care and check labs.    Diabetes  She presents for her follow-up diabetic visit. She has type 2 diabetes mellitus. There are no hypoglycemic associated symptoms. There are no diabetic associated symptoms. Pertinent negatives for diabetes include no chest pain. There are no hypoglycemic complications. Risk factors for coronary artery disease include diabetes mellitus, dyslipidemia and obesity. Current diabetic treatments: was perscribed Farxiga but shortly stopped it due to concern that it would cause vaginal rash and hyperlipidemia. Her weight is fluctuating minimally. She is following a generally unhealthy diet. When asked about meal planning, she reported none. She has not had a previous visit with a dietitian. She never participates in exercise. Her overall blood glucose range is 140-180 mg/dl. An ACE inhibitor/angiotensin II receptor blocker is being taken. She does not see a podiatrist.Eye exam is not current.   Hyperlipidemia  This is a chronic problem. The current episode started more than 1 year ago. Recent lipid tests were reviewed and are variable. Exacerbating diseases include diabetes and obesity. Pertinent negatives include no chest pain, myalgias or shortness of breath. Current antihyperlipidemic treatment includes statins. Compliance problems: Patient stopped medication.  Risk factors for coronary artery disease include diabetes mellitus, dyslipidemia, hypertension and obesity.   Hypertension  This is a chronic problem. The current episode started more than 1 year ago. The problem has been waxing and waning since onset. Pertinent negatives include no anxiety,  "chest pain, peripheral edema or shortness of breath. Risk factors for coronary artery disease include diabetes mellitus, dyslipidemia and obesity. Past treatments include beta blockers and angiotensin blockers. Current antihypertension treatment includes angiotensin blockers and beta blockers. The current treatment provides significant improvement. There are no compliance problems.  Identifiable causes of hypertension include hypercortisolism and a thyroid problem.       Objective   Vital Signs:   /82 (BP Location: Left arm, Patient Position: Sitting)   Pulse 104   Temp 98.2 °F (36.8 °C)   Ht 162.6 cm (64\")   Wt (!) 138 kg (304 lb 9.6 oz)   SpO2 93%   BMI 52.28 kg/m²     Physical Exam  Vitals reviewed.   Constitutional:       Appearance: Normal appearance. She is well-developed. She is morbidly obese.   HENT:      Head: Normocephalic and atraumatic.      Right Ear: External ear normal.      Left Ear: External ear normal.   Eyes:      Conjunctiva/sclera: Conjunctivae normal.      Pupils: Pupils are equal, round, and reactive to light.   Cardiovascular:      Rate and Rhythm: Regular rhythm. Tachycardia present.      Heart sounds: No murmur heard.    No friction rub.   Pulmonary:      Effort: Pulmonary effort is normal.      Breath sounds: Normal breath sounds. No wheezing or rhonchi.   Musculoskeletal:      Right lower leg: No edema.      Left lower leg: No edema.   Skin:     General: Skin is warm and dry.   Neurological:      Mental Status: She is alert and oriented to person, place, and time.   Psychiatric:         Mood and Affect: Mood and affect normal.         Behavior: Behavior normal.         Thought Content: Thought content normal.         Judgment: Judgment normal.        Result Review :   The following data was reviewed by: MIC Tarango on 07/07/2022:  Common labs    Common Labsle 4/4/22 4/4/22 4/4/22 4/8/22 4/8/22 7/7/22 7/7/22 7/7/22 7/7/22 7/7/22    0955 0955 0955 1414 1414 0955 " 0955 0955 0955 0955   Glucose 173 (A)       172 (A)     Glucose     110 (A)        BUN 8    7   6     Creatinine 0.86    0.81   0.74     eGFR Non African Am     >60        eGFR  Am     >60        Sodium 139    139   138     Potassium 4.4    4.5   4.3     Chloride 100    100   97 (A)     Calcium 10.0    10.0   9.6     Albumin 4.20    4.0   4.30     Total Bilirubin 0.2    0.3   0.5     Alkaline Phosphatase 64    67   85     AST (SGOT) 32    42 (A)   79 (A)     ALT (SGPT) 26    37 (A)   57 (A)     WBC    10.22  9.14       Hemoglobin    13.3  14.3       Hematocrit    43.1  44.1       Platelets    466 (A)  459 (A)       Total Cholesterol  209 (A)       214 (A)    Triglycerides  102       133    HDL Cholesterol  51       49    LDL Cholesterol   140 (A)       141 (A)    Hemoglobin A1C   7.90 (A)    8.80 (A)      Microalbumin, Urine          45.9   (A) Abnormal value       Comments are available for some flowsheets but are not being displayed.           Data reviewed: [facility] notes from endocrinology, neurology, gastroenterology,             Current Outpatient Medications on File Prior to Visit   Medication Sig Dispense Refill   • Adalimumab (Humira Pen) 40 MG/0.4ML Pen-injector Kit Inject 40 mg under the skin into the appropriate area as directed Every 14 (Fourteen) Days.     • albuterol sulfate  (90 Base) MCG/ACT inhaler Inhale 2 puffs Every 4 (Four) Hours As Needed for Wheezing.     • atorvastatin (LIPITOR) 20 MG tablet Take 1 tablet by mouth Every Night. 30 tablet 3   • azelastine (ASTELIN) 0.1 % nasal spray      • cetirizine (zyrTEC) 10 MG tablet Take 1 tablet by mouth Daily. 30 tablet 5   • Cholecalciferol (Vitamin D) 50 MCG (2000 UT) capsule Take 1 capsule by mouth Daily. 30 capsule 5   • Dapagliflozin Propanediol (Farxiga) 10 MG tablet Take 10 mg by mouth Daily. 30 tablet 5   • folic acid (FOLVITE) 1 MG tablet Take 1 tablet by mouth Daily. 90 tablet 3   • Iodine Strong, Lugols, (IODINE STRONG PO) Take  by  mouth Daily.     • losartan (COZAAR) 50 MG tablet Take 1 tablet by mouth Daily. 30 tablet 5   • metoprolol succinate XL (TOPROL-XL) 50 MG 24 hr tablet Take 1 tablet by mouth Daily. 30 tablet 5   • montelukast (SINGULAIR) 10 MG tablet Take 1 tablet by mouth Every Night. (Patient taking differently: Take 20 mg by mouth Every Night.) 30 tablet 5   • NP Thyroid 15 MG tablet TAKE ONE TABLET BY MOUTH ONCE EVERY MORNING BEFORE BREAKFAST. TAKE WITH 120 MG DOSE. 30 tablet 1   • predniSONE (DELTASONE) 5 MG tablet As Needed.     • Selenium 200 MCG tablet Take  by mouth Daily.     • Thyroid 120 MG PO tablet Take with 15 mg tablet every a.m. 30 tablet 1   • Zinc 15 MG capsule Take 15 mg by mouth Daily.     • fluconazole (Diflucan) 150 MG tablet One immediately and repeat dose in 72 hours 2 tablet 0   • Jaimiess 0.15-0.03 &0.01 MG tablet Take 1 tablet by mouth Daily. 84 each 3     No current facility-administered medications on file prior to visit.        Assessment and Plan    Diagnoses and all orders for this visit:    1. Follow-up exam, 3-6 months since previous exam (Primary)    2. Uncontrolled type 2 diabetes mellitus with hyperglycemia (HCC)  Comments:  Will check labs, adjust medication, patient is agreeable treatment plan  Orders:  -     CBC Auto Differential  -     Comprehensive Metabolic Panel  -     Hemoglobin A1c  -     Lipid Panel  -     MicroAlbumin, Urine, Random - Urine, Clean Catch  -     TSH    3. Mixed hyperlipidemia  Comments:  Stopped due to concern of having CALDERON and concerned medication would hurt the liver more, will check labs, if needed start on a different medication.     4. Essential hypertension  Comments:  Blood pressure slightly elevated one 144/82.  Patient denies any headache, chest pain or change in vision.  We will continue to monitor    Other orders  -     SITagliptin (Januvia) 100 MG tablet; Take 1 tablet by mouth Daily.  Dispense: 90 tablet; Refill: 1        Follow Up   Return in about 3  months (around 10/7/2022) for Recheck.  Patient was given instructions and counseling regarding her condition or for health maintenance advice. Please see specific information pulled into the AVS if appropriate.

## 2022-07-08 RX ORDER — ATORVASTATIN CALCIUM 20 MG/1
20 TABLET, FILM COATED ORAL NIGHTLY
Qty: 30 TABLET | Refills: 3 | Status: SHIPPED | OUTPATIENT
Start: 2022-07-08 | End: 2022-09-26

## 2022-07-27 RX ORDER — LEVOTHYROXINE, LIOTHYRONINE 76; 18 UG/1; UG/1
TABLET ORAL
Qty: 30 TABLET | Refills: 5 | Status: SHIPPED | OUTPATIENT
Start: 2022-07-27 | End: 2022-10-17 | Stop reason: SDUPTHER

## 2022-07-27 RX ORDER — LEVOTHYROXINE, LIOTHYRONINE 9.5; 2.25 UG/1; UG/1
TABLET ORAL
Qty: 30 TABLET | Refills: 1 | Status: SHIPPED | OUTPATIENT
Start: 2022-07-27 | End: 2022-09-08

## 2022-07-27 RX ORDER — LOSARTAN POTASSIUM 50 MG/1
TABLET ORAL
Qty: 30 TABLET | Refills: 5 | Status: SHIPPED | OUTPATIENT
Start: 2022-07-27 | End: 2022-10-17 | Stop reason: SDUPTHER

## 2022-07-27 RX ORDER — CETIRIZINE HYDROCHLORIDE 10 MG/1
TABLET ORAL
Qty: 30 TABLET | Refills: 5 | Status: SHIPPED | OUTPATIENT
Start: 2022-07-27 | End: 2022-10-17 | Stop reason: SDUPTHER

## 2022-07-27 RX ORDER — MONTELUKAST SODIUM 10 MG/1
TABLET ORAL
Qty: 30 TABLET | Refills: 5 | Status: SHIPPED | OUTPATIENT
Start: 2022-07-27 | End: 2022-10-17 | Stop reason: SDUPTHER

## 2022-07-27 RX ORDER — METOPROLOL SUCCINATE 50 MG/1
TABLET, EXTENDED RELEASE ORAL
Qty: 30 TABLET | Refills: 5 | Status: SHIPPED | OUTPATIENT
Start: 2022-07-27 | End: 2022-10-17 | Stop reason: SDUPTHER

## 2022-07-27 RX ORDER — ACETAMINOPHEN 160 MG
TABLET,DISINTEGRATING ORAL
Qty: 30 CAPSULE | Refills: 5 | Status: SHIPPED | OUTPATIENT
Start: 2022-07-27 | End: 2022-10-17 | Stop reason: SDUPTHER

## 2022-08-29 ENCOUNTER — TRANSCRIBE ORDERS (OUTPATIENT)
Dept: LAB | Facility: HOSPITAL | Age: 42
End: 2022-08-29

## 2022-08-29 ENCOUNTER — LAB (OUTPATIENT)
Dept: LAB | Facility: HOSPITAL | Age: 42
End: 2022-08-29

## 2022-08-29 DIAGNOSIS — T78.1XXA OTHER ADVERSE FOOD REACTIONS, NOT ELSEWHERE CLASSIFIED, INITIAL ENCOUNTER: ICD-10-CM

## 2022-08-29 DIAGNOSIS — T78.1XXA OTHER ADVERSE FOOD REACTIONS, NOT ELSEWHERE CLASSIFIED, INITIAL ENCOUNTER: Primary | ICD-10-CM

## 2022-08-29 PROCEDURE — 86003 ALLG SPEC IGE CRUDE XTRC EA: CPT

## 2022-08-29 PROCEDURE — 86008 ALLG SPEC IGE RECOMB EA: CPT

## 2022-08-29 PROCEDURE — 82785 ASSAY OF IGE: CPT

## 2022-08-29 PROCEDURE — 36415 COLL VENOUS BLD VENIPUNCTURE: CPT

## 2022-08-30 LAB
ALMOND IGE QN: <0.1 KU/L
BRAZIL NUT IGE QN: <0.1 KU/L
CASHEW NUT IGE QN: <0.1 KU/L
CHOCOLATE IGE QN: <0.1 KU/L
CODFISH IGE QN: <0.1 KU/L
EGG WHITE IGE QN: <0.1 KU/L
EGG YOLK IGE QN: <0.1 KU/L
HAZELNUT IGE QN: <0.1 KU/L
IGE SERPL-ACNC: 5.75 KU/L
PEANUT IGE QN: <0.1 KU/L
PECAN/HICK NUT IGE QN: <0.1 KU/L
PISTACHIO IGE QN: <0.1 KU/L
SESAME SEED IGE QN: <0.1 KU/L
SOYBEAN IGE QN: <0.1 KU/L
WALNUT IGE QN: <0.1 KU/L
WHEAT IGE QN: <0.1 KU/L

## 2022-09-02 LAB
A-LACTALB IGE QN: <0.1 KU/L
B-LACTOGLOB IGE QN: <0.1 KU/L
BOG CRANBERRY IGE AB [UNITS/VOLUME] IN SERUM: <0.1 KU/L
CASEIN IGE QN: <0.1 KU/L
CHEDDAR IGE QN: <0.1 KU/L
CHEESE MOLD IGE QN: <0.1 KU/L
CONV CLASS DESCRIPTION: NORMAL
COW MILK IGE QN: <0.1 KU/L
EGGPLANT IGE QN: <0.1 KU/L

## 2022-09-08 RX ORDER — LEVOTHYROXINE, LIOTHYRONINE 9.5; 2.25 UG/1; UG/1
TABLET ORAL
Qty: 30 TABLET | Refills: 1 | Status: SHIPPED | OUTPATIENT
Start: 2022-09-08 | End: 2022-10-17 | Stop reason: SDUPTHER

## 2022-09-26 RX ORDER — ATORVASTATIN CALCIUM 20 MG/1
20 TABLET, FILM COATED ORAL NIGHTLY
Qty: 30 TABLET | Refills: 3 | Status: SHIPPED | OUTPATIENT
Start: 2022-09-26 | End: 2022-10-17 | Stop reason: SDUPTHER

## 2022-10-17 ENCOUNTER — OFFICE VISIT (OUTPATIENT)
Dept: FAMILY MEDICINE CLINIC | Facility: CLINIC | Age: 42
End: 2022-10-17

## 2022-10-17 VITALS
TEMPERATURE: 97.3 F | DIASTOLIC BLOOD PRESSURE: 86 MMHG | HEIGHT: 64 IN | SYSTOLIC BLOOD PRESSURE: 118 MMHG | OXYGEN SATURATION: 97 % | BODY MASS INDEX: 50.02 KG/M2 | WEIGHT: 293 LBS | HEART RATE: 73 BPM

## 2022-10-17 DIAGNOSIS — U07.1 COVID-19: ICD-10-CM

## 2022-10-17 DIAGNOSIS — Z09 FOLLOW-UP EXAM, 3-6 MONTHS SINCE PREVIOUS EXAM: Primary | ICD-10-CM

## 2022-10-17 DIAGNOSIS — E66.01 MORBID OBESITY WITH BMI OF 50.0-59.9, ADULT: ICD-10-CM

## 2022-10-17 DIAGNOSIS — E03.9 ACQUIRED HYPOTHYROIDISM: ICD-10-CM

## 2022-10-17 DIAGNOSIS — J10.1 INFLUENZA A: ICD-10-CM

## 2022-10-17 DIAGNOSIS — E11.65 TYPE 2 DIABETES MELLITUS WITH HYPERGLYCEMIA, WITHOUT LONG-TERM CURRENT USE OF INSULIN: ICD-10-CM

## 2022-10-17 LAB
EXPIRATION DATE: ABNORMAL
FLUAV AG UPPER RESP QL IA.RAPID: DETECTED
FLUBV AG UPPER RESP QL IA.RAPID: NOT DETECTED
INTERNAL CONTROL: ABNORMAL
Lab: ABNORMAL
SARS-COV-2 AG UPPER RESP QL IA.RAPID: DETECTED

## 2022-10-17 PROCEDURE — 99214 OFFICE O/P EST MOD 30 MIN: CPT | Performed by: NURSE PRACTITIONER

## 2022-10-17 PROCEDURE — 87428 SARSCOV & INF VIR A&B AG IA: CPT | Performed by: NURSE PRACTITIONER

## 2022-10-17 RX ORDER — ALBUTEROL SULFATE 90 UG/1
2 AEROSOL, METERED RESPIRATORY (INHALATION) EVERY 4 HOURS PRN
Qty: 8 G | Refills: 2 | Status: SHIPPED | OUTPATIENT
Start: 2022-10-17 | End: 2022-12-21

## 2022-10-17 RX ORDER — LOSARTAN POTASSIUM 50 MG/1
50 TABLET ORAL DAILY
Qty: 30 TABLET | Refills: 5 | Status: SHIPPED | OUTPATIENT
Start: 2022-10-17

## 2022-10-17 RX ORDER — CETIRIZINE HYDROCHLORIDE 10 MG/1
10 TABLET ORAL DAILY
Qty: 90 TABLET | Refills: 1 | Status: SHIPPED | OUTPATIENT
Start: 2022-10-17 | End: 2023-01-16

## 2022-10-17 RX ORDER — METOPROLOL SUCCINATE 50 MG/1
50 TABLET, EXTENDED RELEASE ORAL DAILY
Qty: 30 TABLET | Refills: 5 | Status: SHIPPED | OUTPATIENT
Start: 2022-10-17 | End: 2023-01-16

## 2022-10-17 RX ORDER — CHOLECALCIFEROL (VITAMIN D3) 125 MCG
CAPSULE ORAL
COMMUNITY
Start: 2022-09-26 | End: 2023-01-16

## 2022-10-17 RX ORDER — LEVOTHYROXINE AND LIOTHYRONINE 76; 18 UG/1; UG/1
120 TABLET ORAL DAILY
Qty: 30 TABLET | Refills: 5 | Status: SHIPPED | OUTPATIENT
Start: 2022-10-17 | End: 2023-01-16

## 2022-10-17 RX ORDER — LEVOTHYROXINE AND LIOTHYRONINE 9.5; 2.25 UG/1; UG/1
15 TABLET ORAL DAILY
Qty: 30 TABLET | Refills: 5 | Status: SHIPPED | OUTPATIENT
Start: 2022-10-17

## 2022-10-17 RX ORDER — DEXAMETHASONE 1 MG
TABLET ORAL
COMMUNITY
Start: 2022-09-15

## 2022-10-17 RX ORDER — FOLIC ACID 1 MG/1
1 TABLET ORAL DAILY
Qty: 90 TABLET | Refills: 3 | Status: SHIPPED | OUTPATIENT
Start: 2022-10-17

## 2022-10-17 RX ORDER — MONTELUKAST SODIUM 10 MG/1
10 TABLET ORAL
Qty: 30 TABLET | Refills: 5 | Status: SHIPPED | OUTPATIENT
Start: 2022-10-17 | End: 2023-01-16

## 2022-10-17 RX ORDER — HYDROXYZINE 50 MG/1
50 TABLET, FILM COATED ORAL NIGHTLY
Qty: 30 TABLET | Refills: 0 | Status: SHIPPED | OUTPATIENT
Start: 2022-10-17

## 2022-10-17 RX ORDER — ATORVASTATIN CALCIUM 20 MG/1
20 TABLET, FILM COATED ORAL NIGHTLY
Qty: 30 TABLET | Refills: 3 | Status: SHIPPED | OUTPATIENT
Start: 2022-10-17 | End: 2023-04-03

## 2022-10-17 RX ORDER — ADALIMUMAB 40MG/0.4ML
40 KIT SUBCUTANEOUS
Qty: 2 EACH | Refills: 1 | Status: SHIPPED | OUTPATIENT
Start: 2022-10-17

## 2022-10-17 RX ORDER — ACETAMINOPHEN 160 MG
2000 TABLET,DISINTEGRATING ORAL DAILY
Qty: 30 CAPSULE | Refills: 5 | Status: SHIPPED | OUTPATIENT
Start: 2022-10-17

## 2022-10-17 NOTE — PROGRESS NOTES
Chief Complaint  Cough, Nasal Congestion, and Follow-up (Diabetes, hyperlipidemia, hypertension, needs medication refill)    Subjective          Medical History: has a past medical history of ADHD (attention deficit hyperactivity disorder), Allergic, Allergic rhinitis (07/10/2015), Anemia, Anxiety, Asthma, B12 deficiency, Depression, Difficulty concentrating (07/15/2016), Graves disease (07/10/2015), HLD (hyperlipidemia) (07/10/2015), Hyperlipidemia LDL goal <100 (7/10/2015), Hypertension, Hyperthyroidism, Inappropriate sinus tachycardia (10/7/2021), Low back pain, Morbid obesity ([facility]), PCOS (polycystic ovarian syndrome), Pneumonia, Primary insomnia (10/25/2015), Psoriasis (07/10/2015), RA (rheumatoid arthritis) ([facility]), 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     Surgical History: has a past surgical history that includes Appendectomy (1984); Cyst Removal; Incision and Drainage Abscess; Ovarian cyst removal (1999); Septoplasty (2012); Sinus surgery (2012); and Other surgical history.     Family History: family history includes Alcohol abuse in her maternal grandfather, maternal uncle, paternal uncle, and sister; Arrhythmia in her father; COPD in her father; Cancer in her father; Hypothyroidism in her father and mother; Lung cancer in her paternal grandmother; Mental illness in her brother, maternal grandfather, maternal grandmother, mother, paternal uncle, sister, and sister; Osteopenia in her mother; Osteoporosis in her father; Other in her mother; Stomach cancer in her maternal grandfather; Testicular cancer in her brother.     Social History: reports that she has never smoked. She has never used smokeless tobacco. She reports that she does not currently use alcohol. She reports that she does not use drugs.    Beena HERNANDEZ [place] presents to [facility]  History of Present Illness  Diabetes  She presents for her follow-up diabetic visit.  She has type 2 diabetes mellitus. There are no hypoglycemic associated symptoms. There are no diabetic associated symptoms. Pertinent negatives for diabetes include no chest pain. There are no hypoglycemic complications. Risk factors for coronary artery disease include diabetes mellitus, dyslipidemia and obesity. Current diabetic treatments: was perscribed Farxiga but shortly stopped it due to concern that it would cause vaginal rash and hyperlipidemia. Her weight is fluctuating minimally. She is following a generally unhealthy diet. When asked about meal planning, she reported none. She has not had a previous visit with a dietitian. She never participates in exercise. Her overall blood glucose range is 140-180 mg/dl. An ACE inhibitor/angiotensin II receptor blocker is being taken. She does not see a podiatrist.Eye exam is not current.     Hyperlipidemia  This is a chronic problem. The current episode started more than 1 year ago. Recent lipid tests were reviewed and are variable. Exacerbating diseases include diabetes and obesity. Pertinent negatives include no chest pain, myalgias or shortness of breath. Current antihyperlipidemic treatment includes statins. Compliance problems: Patient stopped medication.  Risk factors for coronary artery disease include diabetes mellitus, dyslipidemia, hypertension and obesity.     Hypertension  This is a chronic problem. The current episode started more than 1 year ago. The problem has been waxing and waning since onset. Pertinent negatives include no anxiety, chest pain, peripheral edema or shortness of breath. Risk factors for coronary artery disease include diabetes mellitus, dyslipidemia and obesity. Past treatments include beta blockers and angiotensin blockers. Current antihypertension treatment includes angiotensin blockers and beta blockers. The current treatment provides significant improvement. There are no compliance problems.  Identifiable causes of hypertension  "include hypercortisolism and a thyroid problem.      Cough  This is a new problem. The current episode started in the past 7 days (5 days ago). The problem has been waxing and waning. The problem occurs every few minutes. Associated symptoms include myalgias, postnasal drip and rhinorrhea. Pertinent negatives include no ear pain, fever or shortness of breath. She has tried OTC cough suppressant for the symptoms. The treatment provided moderate relief.       Objective   Vital Signs:   /86   Pulse 73   Temp 97.3 °F (36.3 °C)   Ht 162.6 cm (64\")   Wt 133 kg (294 lb)   SpO2 97%   BMI 50.46 kg/m²     Physical Exam  Vitals reviewed.   Constitutional:       Appearance: Normal appearance. She is well-developed. She is morbidly obese.   HENT:      Head: Normocephalic and atraumatic.      Right Ear: External ear normal.      Left Ear: External ear normal.      Mouth/Throat:      Mouth: Mucous membranes are moist.      Comments: Post nasal drip  Eyes:      Conjunctiva/sclera: Conjunctivae normal.      Pupils: Pupils are equal, round, and reactive to light.   Cardiovascular:      Rate and Rhythm: Normal rate and regular rhythm.      Heart sounds: No murmur heard.    No friction rub.   Pulmonary:      Effort: Pulmonary effort is normal.      Breath sounds: Normal breath sounds. No wheezing or rhonchi.   Skin:     General: Skin is warm and dry.   Neurological:      Mental Status: She is alert and oriented to person, place, and time.   Psychiatric:         Mood and Affect: Mood and affect normal.         Behavior: Behavior normal.         Thought Content: Thought content normal.         Judgment: Judgment normal.        Result Review :   The following data was reviewed by: MIC Tarango on 10/17/2022:  Common labs    Common Labs 4/4/22 4/4/22 4/4/22 4/8/22 4/8/22 7/7/22 7/7/22 7/7/22 7/7/22 7/7/22    0955 0955 0955 1414 1414 0955 0955 0955 0955 0955   Glucose 173 (A)       172 (A)     Glucose     110 (A)   "      BUN 8    7   6     Creatinine 0.86    0.81   0.74     eGFR Non African Am     >60        eGFR  Am     >60        Sodium 139    139   138     Potassium 4.4    4.5   4.3     Chloride 100    100   97 (A)     Calcium 10.0    10.0   9.6     Albumin 4.20    4.0   4.30     Total Bilirubin 0.2    0.3   0.5     Alkaline Phosphatase 64    67   85     AST (SGOT) 32    42 (A)   79 (A)     ALT (SGPT) 26    37 (A)   57 (A)     WBC    10.22  9.14       Hemoglobin    13.3  14.3       Hematocrit    43.1  44.1       Platelets    466 (A)  459 (A)       Total Cholesterol  209 (A)       214 (A)    Triglycerides  102       133    HDL Cholesterol  51       49    LDL Cholesterol   140 (A)       141 (A)    Hemoglobin A1C   7.90 (A)    8.80 (A)      Microalbumin, Urine          45.9   (A) Abnormal value       Comments are available for some flowsheets but are not being displayed.                       Current Outpatient Medications on File Prior to Visit   Medication Sig Dispense Refill   • azelastine (ASTELIN) 0.1 % nasal spray      • dexamethasone (DECADRON) 1 MG tablet Take 1 tablet when directed     • Iodine Strong, Lugols, (IODINE STRONG PO) Take  by mouth Daily.     • Jaimiess 0.15-0.03 &0.01 MG tablet Take 1 tablet by mouth Daily. 84 each 3   • Liraglutide (VICTOZA) 18 MG/3ML solution pen-injector injection Inject 0.6 mg under the skin into the appropriate area as directed Daily for 90 days. 3 pen 1   • predniSONE (DELTASONE) 5 MG tablet As Needed.     • Selenium 200 MCG tablet Take  by mouth Daily.     • Zinc 15 MG capsule Take 15 mg by mouth Daily.     • [DISCONTINUED] Adalimumab (Humira Pen) 40 MG/0.4ML Pen-injector Kit Inject 40 mg under the skin into the appropriate area as directed Every 14 (Fourteen) Days.     • [DISCONTINUED] albuterol sulfate  (90 Base) MCG/ACT inhaler Inhale 2 puffs Every 4 (Four) Hours As Needed for Wheezing.     • [DISCONTINUED] atorvastatin (LIPITOR) 20 MG tablet TAKE 1 TABLET BY MOUTH  EVERY NIGHT. 30 tablet 3   • [DISCONTINUED] cetirizine (zyrTEC) 10 MG tablet TAKE ONE TABLET BY MOUTH ONCE DAILY 30 tablet 5   • [DISCONTINUED] Cholecalciferol (Vitamin D3) 50 MCG (2000 UT) capsule TAKE ONE CAPSULE BY MOUTH ONCE DAILY 30 capsule 5   • [DISCONTINUED] Dapagliflozin Propanediol (Farxiga) 10 MG tablet Take 10 mg by mouth Daily. 30 tablet 5   • [DISCONTINUED] folic acid (FOLVITE) 1 MG tablet Take 1 tablet by mouth Daily. 90 tablet 3   • [DISCONTINUED] losartan (COZAAR) 50 MG tablet TAKE ONE TABLET BY MOUTH ONCE DAILY 30 tablet 5   • [DISCONTINUED] metoprolol succinate XL (TOPROL-XL) 50 MG 24 hr tablet TAKE ONE TABLET BY MOUTH ONCE DAILY 30 tablet 5   • [DISCONTINUED] montelukast (SINGULAIR) 10 MG tablet TAKE ONE TABLET BY MOUTH EVERY NIGHT AT BEDTIME 30 tablet 5   • [DISCONTINUED] NP Thyroid 120 MG tablet TAKE ONE TABLET BY MOUTH ONCE DAILY 30 tablet 5   • [DISCONTINUED] NP Thyroid 15 MG tablet TAKE ONE TABLET BY MOUTH ONCE EVERY MORNING BEFORE BREAKFAST. TAKE WITH 120 MG DOSE. 30 tablet 1   • Cholecalciferol (Vitamin D3) 50 MCG (2000 UT) tablet      • [DISCONTINUED] fluconazole (Diflucan) 150 MG tablet One immediately and repeat dose in 72 hours 2 tablet 0     No current facility-administered medications on file prior to visit.        Assessment and Plan    Diagnoses and all orders for this visit:    1. Follow-up exam, 3-6 months since previous exam (Primary)    2. Type 2 diabetes mellitus with hyperglycemia, without long-term current use of insulin (HCC)  Comments:  Hold on labs we will have patient follow-up in 2 weeks as a walk-in to get her labs done since she has COVID and influenza a at today's visit.  Orders:  -     CBC Auto Differential; Future  -     Comprehensive Metabolic Panel; Future  -     Hemoglobin A1c; Future  -     Lipid Panel; Future  -     MicroAlbumin, Urine, Random - Urine, Clean Catch; Future  -     Thyroid Panel With TSH; Future    3. Acquired hypothyroidism  Comments:  We will  follow-up as a walk-in in 2 weeks patient has been diagnosed with COVID and influenza A a at today's visit.  Orders:  -     CBC Auto Differential; Future  -     Comprehensive Metabolic Panel; Future  -     Thyroid Panel With TSH; Future    4. COVID-19  Comments:  Patient is having trouble sleeping while she has had the COVID we will send over some hydroxyzine 50 mg at bedtime to help with sleep discussed discontinue once  Orders:  -     POCT SARS-CoV-2 Antigen JAMES + Flu    5. Influenza A  Comments:  Onset symptoms 5 days, rest, drink plenty of fluids, Tylenol as needed for fever.  Patient is agreeable treatment plan  Orders:  -     POCT SARS-CoV-2 Antigen JAMES + Flu    6. Morbid obesity with BMI of 50.0-59.9, adult (HCC)    Other orders  -     Cancel: FluLaval/Fluzone >6 mos ([id number])  -     Adalimumab (Humira Pen) 40 MG/0.4ML Pen-injector Kit; Inject 40 mg under the skin into the appropriate area as directed Every 14 (Fourteen) Days.  Dispense: 2 each; Refill: 1  -     albuterol sulfate  (90 Base) MCG/ACT inhaler; Inhale 2 puffs Every 4 (Four) Hours As Needed for Wheezing.  Dispense: 8 g; Refill: 2  -     atorvastatin (LIPITOR) 20 MG tablet; Take 1 tablet by mouth Every Night.  Dispense: 30 tablet; Refill: 3  -     cetirizine (zyrTEC) 10 MG tablet; Take 1 tablet by mouth Daily.  Dispense: 90 tablet; Refill: 1  -     Cholecalciferol (Vitamin D3) 50 MCG (2000 UT) capsule; Take 1 capsule by mouth Daily.  Dispense: 30 capsule; Refill: 5  -     folic acid (FOLVITE) 1 MG tablet; Take 1 tablet by mouth Daily.  Dispense: 90 tablet; Refill: 3  -     losartan (COZAAR) 50 MG tablet; Take 1 tablet by mouth Daily.  Dispense: 30 tablet; Refill: 5  -     metoprolol succinate XL (TOPROL-XL) 50 MG 24 hr tablet; Take 1 tablet by mouth Daily.  Dispense: 30 tablet; Refill: 5  -     montelukast (SINGULAIR) 10 MG tablet; Take 1 tablet by mouth every night at bedtime.  Dispense: 30 tablet; Refill: 5  -     Thyroid (NP THYROID)  120 MG PO tablet; Take 1 tablet by mouth Daily.  Dispense: 30 tablet; Refill: 5  -     thyroid (NP Thyroid) 15 MG tablet; Take 1 tablet by mouth Daily.  Dispense: 30 tablet; Refill: 5  -     hydrOXYzine (ATARAX) 50 MG tablet; Take 1 tablet by mouth Every Night.  Dispense: 30 tablet; Refill: 0        Follow Up   No follow-ups on file.  Patient was given instructions and counseling regarding her condition or for health maintenance advice. Please see specific information pulled into the AVS if appropriate.

## 2022-10-31 ENCOUNTER — LAB (OUTPATIENT)
Dept: FAMILY MEDICINE CLINIC | Facility: CLINIC | Age: 42
End: 2022-10-31

## 2022-10-31 DIAGNOSIS — E03.9 ACQUIRED HYPOTHYROIDISM: ICD-10-CM

## 2022-10-31 DIAGNOSIS — E11.65 TYPE 2 DIABETES MELLITUS WITH HYPERGLYCEMIA, WITHOUT LONG-TERM CURRENT USE OF INSULIN: ICD-10-CM

## 2022-10-31 LAB
ALBUMIN SERPL-MCNC: 4.4 G/DL (ref 3.5–5.2)
ALBUMIN UR-MCNC: 14.3 MG/DL
ALBUMIN/GLOB SERPL: 1.2 G/DL
ALP SERPL-CCNC: 96 U/L (ref 39–117)
ALT SERPL W P-5'-P-CCNC: 37 U/L (ref 1–33)
ANION GAP SERPL CALCULATED.3IONS-SCNC: 10.8 MMOL/L (ref 5–15)
AST SERPL-CCNC: 38 U/L (ref 1–32)
BILIRUB SERPL-MCNC: 0.5 MG/DL (ref 0–1.2)
BUN SERPL-MCNC: 6 MG/DL (ref 6–20)
BUN/CREAT SERPL: 7.7 (ref 7–25)
CALCIUM SPEC-SCNC: 10.5 MG/DL (ref 8.6–10.5)
CHLORIDE SERPL-SCNC: 98 MMOL/L (ref 98–107)
CHOLEST SERPL-MCNC: 161 MG/DL (ref 0–200)
CO2 SERPL-SCNC: 29.2 MMOL/L (ref 22–29)
CREAT SERPL-MCNC: 0.78 MG/DL (ref 0.57–1)
EGFRCR SERPLBLD CKD-EPI 2021: 98.6 ML/MIN/1.73
GLOBULIN UR ELPH-MCNC: 3.6 GM/DL
GLUCOSE SERPL-MCNC: 217 MG/DL (ref 65–99)
HBA1C MFR BLD: 9.9 % (ref 4.8–5.6)
HDLC SERPL-MCNC: 59 MG/DL (ref 40–60)
LDLC SERPL CALC-MCNC: 85 MG/DL (ref 0–100)
LDLC/HDLC SERPL: 1.42 {RATIO}
POTASSIUM SERPL-SCNC: 4.7 MMOL/L (ref 3.5–5.2)
PROT SERPL-MCNC: 8 G/DL (ref 6–8.5)
SODIUM SERPL-SCNC: 138 MMOL/L (ref 136–145)
TRIGL SERPL-MCNC: 90 MG/DL (ref 0–150)
VLDLC SERPL-MCNC: 17 MG/DL (ref 5–40)

## 2022-10-31 PROCEDURE — 82043 UR ALBUMIN QUANTITATIVE: CPT | Performed by: NURSE PRACTITIONER

## 2022-10-31 PROCEDURE — 80053 COMPREHEN METABOLIC PANEL: CPT | Performed by: NURSE PRACTITIONER

## 2022-10-31 PROCEDURE — 83036 HEMOGLOBIN GLYCOSYLATED A1C: CPT | Performed by: NURSE PRACTITIONER

## 2022-10-31 PROCEDURE — 84436 ASSAY OF TOTAL THYROXINE: CPT | Performed by: NURSE PRACTITIONER

## 2022-10-31 PROCEDURE — 85025 COMPLETE CBC W/AUTO DIFF WBC: CPT | Performed by: NURSE PRACTITIONER

## 2022-10-31 PROCEDURE — 80061 LIPID PANEL: CPT | Performed by: NURSE PRACTITIONER

## 2022-10-31 PROCEDURE — 36415 COLL VENOUS BLD VENIPUNCTURE: CPT | Performed by: NURSE PRACTITIONER

## 2022-10-31 PROCEDURE — 84479 ASSAY OF THYROID (T3 OR T4): CPT | Performed by: NURSE PRACTITIONER

## 2022-10-31 PROCEDURE — 84443 ASSAY THYROID STIM HORMONE: CPT | Performed by: NURSE PRACTITIONER

## 2022-11-01 ENCOUNTER — TELEPHONE (OUTPATIENT)
Dept: FAMILY MEDICINE CLINIC | Facility: CLINIC | Age: 42
End: 2022-11-01

## 2022-11-01 LAB
BASOPHILS # BLD AUTO: 0.08 10*3/MM3 (ref 0–0.2)
BASOPHILS NFR BLD AUTO: 0.8 % (ref 0–1.5)
DEPRECATED RDW RBC AUTO: 38.4 FL (ref 37–54)
EOSINOPHIL # BLD AUTO: 0.3 10*3/MM3 (ref 0–0.4)
EOSINOPHIL NFR BLD AUTO: 3 % (ref 0.3–6.2)
ERYTHROCYTE [DISTWIDTH] IN BLOOD BY AUTOMATED COUNT: 12 % (ref 12.3–15.4)
HCT VFR BLD AUTO: 41.7 % (ref 34–46.6)
HGB BLD-MCNC: 13.8 G/DL (ref 12–15.9)
IMM GRANULOCYTES # BLD AUTO: 0.05 10*3/MM3 (ref 0–0.05)
IMM GRANULOCYTES NFR BLD AUTO: 0.5 % (ref 0–0.5)
LYMPHOCYTES # BLD AUTO: 3.25 10*3/MM3 (ref 0.7–3.1)
LYMPHOCYTES NFR BLD AUTO: 32.7 % (ref 19.6–45.3)
MCH RBC QN AUTO: 29.5 PG (ref 26.6–33)
MCHC RBC AUTO-ENTMCNC: 33.1 G/DL (ref 31.5–35.7)
MCV RBC AUTO: 89.1 FL (ref 79–97)
MONOCYTES # BLD AUTO: 0.51 10*3/MM3 (ref 0.1–0.9)
MONOCYTES NFR BLD AUTO: 5.1 % (ref 5–12)
NEUTROPHILS NFR BLD AUTO: 5.76 10*3/MM3 (ref 1.7–7)
NEUTROPHILS NFR BLD AUTO: 57.9 % (ref 42.7–76)
NRBC BLD AUTO-RTO: 0 /100 WBC (ref 0–0.2)
PLATELET # BLD AUTO: 391 10*3/MM3 (ref 140–450)
PMV BLD AUTO: 11.7 FL (ref 6–12)
RBC # BLD AUTO: 4.68 10*6/MM3 (ref 3.77–5.28)
T-UPTAKE NFR SERPL: 1.27 TBI (ref 0.8–1.3)
T4 SERPL-MCNC: 7.78 MCG/DL (ref 4.5–11.7)
TSH SERPL DL<=0.05 MIU/L-ACNC: 2.94 UIU/ML (ref 0.27–4.2)
WBC NRBC COR # BLD: 9.95 10*3/MM3 (ref 3.4–10.8)

## 2022-11-01 NOTE — TELEPHONE ENCOUNTER
Patient is requesting DM supplies be submitted to pharmacy on file:     [facility], [facility] - [place], [place] - [address] - [phone]  - [phone] FX  [phone]    Glucose Monitor Device  Glucose Test Strips  Alcohol Pads  Lancets

## 2022-11-02 RX ORDER — PEN NEEDLE, DIABETIC 31 GX5/16"
1 NEEDLE, DISPOSABLE MISCELLANEOUS 2 TIMES DAILY
Qty: 200 EACH | Refills: 3 | Status: SHIPPED | OUTPATIENT
Start: 2022-11-02 | End: 2023-01-31

## 2022-11-02 RX ORDER — LANCETS 23 GAUGE
1 EACH MISCELLANEOUS 2 TIMES DAILY
Qty: 180 EACH | Refills: 1 | Status: SHIPPED | OUTPATIENT
Start: 2022-11-02 | End: 2023-04-03

## 2022-11-02 RX ORDER — BLOOD-GLUCOSE METER
1 KIT MISCELLANEOUS 2 TIMES DAILY
Qty: 1 EACH | Refills: 0 | Status: SHIPPED | OUTPATIENT
Start: 2022-11-02 | End: 2025-07-28

## 2022-11-03 RX ORDER — PEN NEEDLE, DIABETIC 30 GX3/16"
1 NEEDLE, DISPOSABLE MISCELLANEOUS 2 TIMES DAILY
Qty: 100 EACH | Refills: 2 | Status: SHIPPED | OUTPATIENT
Start: 2022-11-03

## 2022-11-22 ENCOUNTER — LAB (OUTPATIENT)
Dept: LAB | Facility: HOSPITAL | Age: 42
End: 2022-11-22

## 2022-11-22 ENCOUNTER — TRANSCRIBE ORDERS (OUTPATIENT)
Dept: ADMINISTRATIVE | Facility: HOSPITAL | Age: 42
End: 2022-11-22

## 2022-11-22 DIAGNOSIS — M05.79 SEROPOSITIVE RHEUMATOID ARTHRITIS OF MULTIPLE SITES: ICD-10-CM

## 2022-11-22 DIAGNOSIS — R29.818 CUSHING PHENOMENON: ICD-10-CM

## 2022-11-22 DIAGNOSIS — R29.818 CUSHING PHENOMENON: Primary | ICD-10-CM

## 2022-11-22 LAB
CORTIS SERPL-MCNC: 0.57 MCG/DL
CRP SERPL-MCNC: 1.99 MG/DL (ref 0–0.5)
ERYTHROCYTE [SEDIMENTATION RATE] IN BLOOD: 37 MM/HR (ref 0–20)

## 2022-11-22 PROCEDURE — 86140 C-REACTIVE PROTEIN: CPT

## 2022-11-22 PROCEDURE — 80299 QUANTITATIVE ASSAY DRUG: CPT

## 2022-11-22 PROCEDURE — 85652 RBC SED RATE AUTOMATED: CPT

## 2022-11-22 PROCEDURE — 82533 TOTAL CORTISOL: CPT

## 2022-11-22 PROCEDURE — 36415 COLL VENOUS BLD VENIPUNCTURE: CPT

## 2022-11-24 VITALS
SYSTOLIC BLOOD PRESSURE: 131 MMHG | HEIGHT: 65 IN | TEMPERATURE: 98 F | HEART RATE: 80 BPM | RESPIRATION RATE: 18 BRPM | BODY MASS INDEX: 48.71 KG/M2 | OXYGEN SATURATION: 98 % | WEIGHT: 292.33 LBS | DIASTOLIC BLOOD PRESSURE: 83 MMHG

## 2022-11-24 LAB
ALBUMIN SERPL-MCNC: 4.2 G/DL (ref 3.5–5.2)
ALBUMIN/GLOB SERPL: 1.2 G/DL
ALP SERPL-CCNC: 88 U/L (ref 39–117)
ALT SERPL W P-5'-P-CCNC: 45 U/L (ref 1–33)
ANION GAP SERPL CALCULATED.3IONS-SCNC: 11.7 MMOL/L (ref 5–15)
AST SERPL-CCNC: 44 U/L (ref 1–32)
BASOPHILS # BLD AUTO: 0.12 10*3/MM3 (ref 0–0.2)
BASOPHILS NFR BLD AUTO: 0.8 % (ref 0–1.5)
BILIRUB SERPL-MCNC: 0.3 MG/DL (ref 0–1.2)
BUN SERPL-MCNC: 13 MG/DL (ref 6–20)
BUN/CREAT SERPL: 11.3 (ref 7–25)
CALCIUM SPEC-SCNC: 10.2 MG/DL (ref 8.6–10.5)
CHLORIDE SERPL-SCNC: 99 MMOL/L (ref 98–107)
CO2 SERPL-SCNC: 26.3 MMOL/L (ref 22–29)
CREAT SERPL-MCNC: 1.15 MG/DL (ref 0.57–1)
DEPRECATED RDW RBC AUTO: 43.3 FL (ref 37–54)
EGFRCR SERPLBLD CKD-EPI 2021: 61.5 ML/MIN/1.73
EOSINOPHIL # BLD AUTO: 0.4 10*3/MM3 (ref 0–0.4)
EOSINOPHIL NFR BLD AUTO: 2.8 % (ref 0.3–6.2)
ERYTHROCYTE [DISTWIDTH] IN BLOOD BY AUTOMATED COUNT: 13 % (ref 12.3–15.4)
GLOBULIN UR ELPH-MCNC: 3.5 GM/DL
GLUCOSE SERPL-MCNC: 118 MG/DL (ref 65–99)
HCG INTACT+B SERPL-ACNC: <0.5 MIU/ML
HCT VFR BLD AUTO: 43 % (ref 34–46.6)
HGB BLD-MCNC: 14 G/DL (ref 12–15.9)
HOLD SPECIMEN: NORMAL
HOLD SPECIMEN: NORMAL
IMM GRANULOCYTES # BLD AUTO: 0.06 10*3/MM3 (ref 0–0.05)
IMM GRANULOCYTES NFR BLD AUTO: 0.4 % (ref 0–0.5)
LIPASE SERPL-CCNC: 30 U/L (ref 13–60)
LYMPHOCYTES # BLD AUTO: 3.11 10*3/MM3 (ref 0.7–3.1)
LYMPHOCYTES NFR BLD AUTO: 21.4 % (ref 19.6–45.3)
MCH RBC QN AUTO: 29.7 PG (ref 26.6–33)
MCHC RBC AUTO-ENTMCNC: 32.6 G/DL (ref 31.5–35.7)
MCV RBC AUTO: 91.3 FL (ref 79–97)
MONOCYTES # BLD AUTO: 0.99 10*3/MM3 (ref 0.1–0.9)
MONOCYTES NFR BLD AUTO: 6.8 % (ref 5–12)
NEUTROPHILS NFR BLD AUTO: 67.8 % (ref 42.7–76)
NEUTROPHILS NFR BLD AUTO: 9.84 10*3/MM3 (ref 1.7–7)
NRBC BLD AUTO-RTO: 0 /100 WBC (ref 0–0.2)
PLATELET # BLD AUTO: 465 10*3/MM3 (ref 140–450)
PMV BLD AUTO: 9.8 FL (ref 6–12)
POTASSIUM SERPL-SCNC: 4.5 MMOL/L (ref 3.5–5.2)
PROT SERPL-MCNC: 7.7 G/DL (ref 6–8.5)
RBC # BLD AUTO: 4.71 10*6/MM3 (ref 3.77–5.28)
SODIUM SERPL-SCNC: 137 MMOL/L (ref 136–145)
WBC NRBC COR # BLD: 14.52 10*3/MM3 (ref 3.4–10.8)
WHOLE BLOOD HOLD COAG: NORMAL
WHOLE BLOOD HOLD SPECIMEN: NORMAL

## 2022-11-24 PROCEDURE — 83690 ASSAY OF LIPASE: CPT

## 2022-11-24 PROCEDURE — 85025 COMPLETE CBC W/AUTO DIFF WBC: CPT

## 2022-11-24 PROCEDURE — 80053 COMPREHEN METABOLIC PANEL: CPT

## 2022-11-24 PROCEDURE — 99283 EMERGENCY DEPT VISIT LOW MDM: CPT

## 2022-11-24 PROCEDURE — 36415 COLL VENOUS BLD VENIPUNCTURE: CPT

## 2022-11-24 PROCEDURE — 84702 CHORIONIC GONADOTROPIN TEST: CPT

## 2022-11-24 RX ORDER — SODIUM CHLORIDE 0.9 % (FLUSH) 0.9 %
10 SYRINGE (ML) INJECTION AS NEEDED
Status: DISCONTINUED | OUTPATIENT
Start: 2022-11-24 | End: 2022-11-25 | Stop reason: HOSPADM

## 2022-11-25 ENCOUNTER — HOSPITAL ENCOUNTER (EMERGENCY)
Facility: HOSPITAL | Age: 42
Discharge: HOME | End: 2022-11-25
Attending: EMERGENCY MEDICINE | Admitting: EMERGENCY MEDICINE

## 2022-11-25 ENCOUNTER — APPOINTMENT (OUTPATIENT)
Dept: CT IMAGING | Facility: HOSPITAL | Age: 42
End: 2022-11-25

## 2022-11-25 DIAGNOSIS — D27.1 DERMOID CYST OF LEFT OVARY: ICD-10-CM

## 2022-11-25 DIAGNOSIS — N20.1 URETEROLITHIASIS: Primary | ICD-10-CM

## 2022-11-25 LAB
BACTERIA UR QL AUTO: ABNORMAL /HPF
BILIRUB UR QL STRIP: NEGATIVE
CLARITY UR: CLEAR
COLOR UR: YELLOW
GLUCOSE UR STRIP-MCNC: NEGATIVE MG/DL
HGB UR QL STRIP.AUTO: ABNORMAL
HYALINE CASTS UR QL AUTO: ABNORMAL /LPF
KETONES UR QL STRIP: NEGATIVE
LEUKOCYTE ESTERASE UR QL STRIP.AUTO: NEGATIVE
NITRITE UR QL STRIP: NEGATIVE
PH UR STRIP.AUTO: 6 [PH] (ref 5–8)
PROT UR QL STRIP: ABNORMAL
RBC # UR STRIP: ABNORMAL /HPF
REF LAB TEST METHOD: ABNORMAL
SP GR UR STRIP: 1.02 (ref 1–1.03)
SQUAMOUS #/AREA URNS HPF: ABNORMAL /HPF
UROBILINOGEN UR QL STRIP: ABNORMAL
WBC # UR STRIP: ABNORMAL /HPF

## 2022-11-25 PROCEDURE — 74176 CT ABD & PELVIS W/O CONTRAST: CPT

## 2022-11-25 PROCEDURE — 25010000002 MORPHINE PER 10 MG: Performed by: EMERGENCY MEDICINE

## 2022-11-25 PROCEDURE — 25010000002 KETOROLAC TROMETHAMINE PER 15 MG: Performed by: EMERGENCY MEDICINE

## 2022-11-25 PROCEDURE — 96374 THER/PROPH/DIAG INJ IV PUSH: CPT

## 2022-11-25 PROCEDURE — 81001 URINALYSIS AUTO W/SCOPE: CPT | Performed by: EMERGENCY MEDICINE

## 2022-11-25 PROCEDURE — 96375 TX/PRO/DX INJ NEW DRUG ADDON: CPT

## 2022-11-25 RX ORDER — IBUPROFEN 600 MG/1
600 TABLET ORAL EVERY 8 HOURS PRN
Qty: 21 TABLET | Refills: 0 | Status: SHIPPED | OUTPATIENT
Start: 2022-11-25 | End: 2022-12-02

## 2022-11-25 RX ORDER — HYDROCODONE BITARTRATE AND ACETAMINOPHEN 5; 325 MG/1; MG/1
1 TABLET ORAL EVERY 4 HOURS PRN
Qty: 18 TABLET | Refills: 0 | Status: SHIPPED | OUTPATIENT
Start: 2022-11-25 | End: 2022-11-28

## 2022-11-25 RX ORDER — SODIUM CHLORIDE 0.9 % (FLUSH) 0.9 %
10 SYRINGE (ML) INJECTION AS NEEDED
Status: DISCONTINUED | OUTPATIENT
Start: 2022-11-25 | End: 2022-11-25 | Stop reason: HOSPADM

## 2022-11-25 RX ORDER — PROMETHAZINE HYDROCHLORIDE 25 MG/1
25 TABLET ORAL EVERY 6 HOURS PRN
Qty: 20 TABLET | Refills: 0 | Status: SHIPPED | OUTPATIENT
Start: 2022-11-25 | End: 2022-11-30

## 2022-11-25 RX ORDER — KETOROLAC TROMETHAMINE 30 MG/ML
30 INJECTION, SOLUTION INTRAMUSCULAR; INTRAVENOUS ONCE
Status: COMPLETED | OUTPATIENT
Start: 2022-11-25 | End: 2022-11-25

## 2022-11-25 RX ORDER — TAMSULOSIN HYDROCHLORIDE 0.4 MG/1
1 CAPSULE ORAL DAILY
Qty: 5 CAPSULE | Refills: 0 | Status: SHIPPED | OUTPATIENT
Start: 2022-11-25 | End: 2022-11-30

## 2022-11-25 RX ADMIN — KETOROLAC TROMETHAMINE 30 MG: 30 INJECTION, SOLUTION INTRAMUSCULAR; INTRAVENOUS at 01:24

## 2022-11-25 RX ADMIN — MORPHINE SULFATE 4 MG: 4 INJECTION, SOLUTION INTRAMUSCULAR; INTRAVENOUS at 01:24

## 2022-11-25 NOTE — ED PROVIDER NOTES
"Time: 12:27 AM EST  Arrived by: private car  Chief Complaint: flank pain  History provided by: pt  History is limited by: N/A     History of Present Illness:  Patient is a 42 y.o. year old female that presents to the emergency department with flank pain.    Pt has been having constant Right-sided flank pain that onset today around 5:00 PM. She notes nausea and 4 episodes of emesis. Pt denies any bloody or black emesis. Pt notes hesitancy and frequency but denies any dysuria or gross hematuria. Pt denies any fever. Her last BM was yesterday. Pt notes normal BM, denies diarrhea, hematochezia and melena.     Her last period was before 11/11/22. She says she is not sexually active.     She notes similar symptoms with past kidney stones. Her last CT scan was 10 years ago.           Similar Symptoms Previously: Yes  Recently seen: No      Patient Care Team  Primary Care Provider: Renee Hogue APRN    Past Medical History:     Allergies   Allergen Reactions   • Meperidine Hallucinations   • Tetanus Immune Globulin GI Intolerance   • Amlodipine Diarrhea   • Ondansetron Hcl Other (See Comments)     Fever     • Tetanus Toxoid Other (See Comments)   • Zofran [Ondansetron] GI Intolerance   • Barley Grass Other (See Comments)   • Metformin Other (See Comments)     Did not work     • Molds & Smuts Other (See Comments)   • Ozempic (0.25 Or 0.5 Mg-Dose) [Semaglutide(0.25 Or 0.5mg-Dos)] Other (See Comments)     \"brain fog\"    • Victoza [Liraglutide] GI Intolerance     Past Medical History:   Diagnosis Date   • ADHD (attention deficit hyperactivity disorder)    • Allergic    • Allergic rhinitis 07/10/2015    WILL ADD FLONASE 2 CETIRIZINE   • Anemia    • Anxiety    • Asthma    • B12 deficiency    • Depression    • Difficulty concentrating 07/15/2016    WILL TRY STRATERA   • Graves disease 07/10/2015   • HLD (hyperlipidemia) 07/10/2015    WILL CHECK LABS AND ADJUST MEDS ACCORDINGLY   • Hyperlipidemia LDL goal <100 7/10/2015 "   • Hypertension    • Hyperthyroidism    • Inappropriate sinus tachycardia 10/7/2021    05/03/21 Holter:  1.  Normal average heart rate of 98 beats per minute. 2.  There was one PVC recorded.    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   • Visual impairment    • Vitamin D deficiency      Past Surgical History:   Procedure Laterality Date   • APPENDECTOMY  1984   • CYST REMOVAL      NECK   • INCISION AND DRAINAGE ABSCESS      NECK   • OTHER SURGICAL HISTORY      thyroid ablation   • OVARIAN CYST REMOVAL  1999   • SEPTOPLASTY  2012   • SINUS SURGERY  2012     Family History   Problem Relation Age of Onset   • COPD Father    • Cancer Father    • Osteoporosis Father    • Arrhythmia Father    • Hypothyroidism Father    • Mental illness Maternal Grandmother    • Stomach cancer Maternal Grandfather    • Alcohol abuse Maternal Grandfather    • Mental illness Maternal Grandfather    • Other Mother         cognitave impairment   • Osteopenia Mother    • Hypothyroidism Mother    • Mental illness Mother    • Testicular cancer Brother    • Lung cancer Paternal Grandmother    • Alcohol abuse Maternal Uncle    • Alcohol abuse Paternal Uncle    • Alcohol abuse Sister    • Mental illness Paternal Uncle    • Mental illness Sister    • Mental illness Sister    • Mental illness Brother        Home Medications:  Prior to Admission medications    Medication Sig Start Date End Date Taking? Authorizing Provider   Adalimumab (Humira Pen) 40 MG/0.4ML Pen-injector Kit Inject 40 mg under the skin into the appropriate area as directed Every 14  (Fourteen) Days. 10/17/22   Renee Hogue APRN   albuterol sulfate  (90 Base) MCG/ACT inhaler Inhale 2 puffs Every 4 (Four) Hours As Needed for Wheezing. 10/17/22   Renee Hogue APRN   Alcohol Swabs (Alcohol Prep) pads 1 pad 2 (Two) Times a Day for 90 days. 11/2/22 1/31/23  Renee Hogue APRN   atorvastatin (LIPITOR) 20 MG tablet Take 1 tablet by mouth Every Night. 10/17/22   Renee Hogue APRN   azelastine (ASTELIN) 0.1 % nasal spray  4/20/21   ProviderEdilberto MD   cetirizine (zyrTEC) 10 MG tablet Take 1 tablet by mouth Daily. 10/17/22   Renee Hogue APRN   Cholecalciferol (Vitamin D3) 50 MCG (2000 UT) capsule Take 1 capsule by mouth Daily. 10/17/22   Renee Hogue APRN   Cholecalciferol (Vitamin D3) 50 MCG (2000 UT) tablet  9/26/22   Edilberto Damon MD   dexamethasone (DECADRON) 1 MG tablet Take 1 tablet when directed 9/15/22   Edilberot Damon MD   folic acid (FOLVITE) 1 MG tablet Take 1 tablet by mouth Daily. 10/17/22   Renee Hogue APRN   glucose blood test strip BID fasting and 2hr after largest meal E11.9 11/2/22   Renee Hogue APRN   glucose monitor monitoring kit 1 each 2 (Two) Times a Day. E11.9 BID fasting and 2hr after largest meal 11/2/22 7/28/25  Renee Hogue APRN   hydrOXYzine (ATARAX) 50 MG tablet Take 1 tablet by mouth Every Night. 10/17/22   Renee Hogue APRN   Insulin Pen Needle (Pen Needles) 32G X 4 MM misc 1 each 2 (Two) Times a Day. 11/3/22   Renee Hogue APRN   Iodine Strong, Lugols, (IODINE STRONG PO) Take  by mouth Daily.    ProviderEdilberto MD   Jaimiess 0.15-0.03 &0.01 MG tablet Take 1 tablet by mouth Daily. 3/1/22   Brandi Foote APRN   Lancets 28G misc 1 each 2 (Two) Times a Day. 11/2/22   Renee Hogue APRN   Liraglutide (VICTOZA) 18 MG/3ML solution pen-injector injection Inject 0.6 mg under the skin into the appropriate area as directed  Daily for 90 days. 8/29/22 11/27/22  Renee Hogue APRN   losartan (COZAAR) 50 MG tablet Take 1 tablet by mouth Daily. 10/17/22   Renee Hogue APRN   metoprolol succinate XL (TOPROL-XL) 50 MG 24 hr tablet Take 1 tablet by mouth Daily. 10/17/22   Renee Hogue APRN   montelukast (SINGULAIR) 10 MG tablet Take 1 tablet by mouth every night at bedtime. 10/17/22   Renee Hogue APRN   predniSONE (DELTASONE) 5 MG tablet As Needed. 8/9/21   ProviderEdilberto MD   Selenium 200 MCG tablet Take  by mouth Daily.    ProviderEdilberto MD   Thyroid (NP THYROID) 120 MG PO tablet Take 1 tablet by mouth Daily. 10/17/22   Renee Hogue APRN   thyroid (NP Thyroid) 15 MG tablet Take 1 tablet by mouth Daily. 10/17/22   Renee Hogue APRN   Zinc 15 MG capsule Take 15 mg by mouth Daily.    ProviderEdilberto MD        Social History:   Social History     Tobacco Use   • Smoking status: Never   • Smokeless tobacco: Never   Vaping Use   • Vaping Use: Never used   Substance Use Topics   • Alcohol use: Not Currently     Comment: rarely   • Drug use: Never         Review of Systems:  Review of Systems   Constitutional: Negative for chills, diaphoresis and fever.   HENT: Negative for congestion, postnasal drip, rhinorrhea and sore throat.    Eyes: Negative for photophobia.   Respiratory: Negative for cough, chest tightness and shortness of breath.    Cardiovascular: Negative for chest pain, palpitations and leg swelling.   Gastrointestinal: Positive for nausea and vomiting. Negative for abdominal pain and diarrhea.   Genitourinary: Positive for difficulty urinating, flank pain and frequency. Negative for dysuria, hematuria and urgency.   Musculoskeletal: Negative for neck pain and neck stiffness.   Skin: Negative for pallor and rash.   Neurological: Negative for dizziness, syncope, weakness, numbness and headaches.   Hematological: Negative for adenopathy. Does not bruise/bleed  "easily.   Psychiatric/Behavioral: Negative.         Physical Exam:  /83 (BP Location: Left arm, Patient Position: Sitting)   Pulse 80   Temp 98 °F (36.7 °C) (Oral)   Resp 18   Ht 165.1 cm (65\")   Wt 133 kg (292 lb 5.3 oz)   SpO2 98%   BMI 48.65 kg/m²     Physical Exam  Vitals and nursing note reviewed.   Constitutional:       General: She is not in acute distress.     Appearance: Normal appearance. She is not ill-appearing, toxic-appearing or diaphoretic.   HENT:      Head: Normocephalic and atraumatic.      Mouth/Throat:      Mouth: Mucous membranes are moist.   Eyes:      Pupils: Pupils are equal, round, and reactive to light.   Cardiovascular:      Rate and Rhythm: Normal rate and regular rhythm.      Pulses: Normal pulses.           Carotid pulses are 2+ on the right side and 2+ on the left side.       Radial pulses are 2+ on the right side and 2+ on the left side.        Femoral pulses are 2+ on the right side and 2+ on the left side.       Popliteal pulses are 2+ on the right side and 2+ on the left side.        Dorsalis pedis pulses are 2+ on the right side and 2+ on the left side.        Posterior tibial pulses are 2+ on the right side and 2+ on the left side.      Heart sounds: Normal heart sounds. No murmur heard.  Pulmonary:      Effort: Pulmonary effort is normal. No accessory muscle usage, respiratory distress or retractions.      Breath sounds: Normal breath sounds. No wheezing, rhonchi or rales.   Abdominal:      General: Abdomen is flat. There is no distension.      Palpations: Abdomen is soft. There is no mass.      Tenderness: There is abdominal tenderness in the right upper quadrant and right lower quadrant. There is right CVA tenderness. There is no left CVA tenderness, guarding or rebound. Positive signs include McBurney's sign.      Comments: No rigidity. Tenderness laterally and superiorly to McBurney's point.   Musculoskeletal:         General: No swelling, tenderness or " deformity.      Cervical back: Neck supple. No tenderness.      Right lower leg: No edema.      Left lower leg: No edema.   Skin:     General: Skin is warm and dry.      Capillary Refill: Capillary refill takes less than 2 seconds.      Coloration: Skin is not jaundiced or pale.      Findings: No erythema.   Neurological:      General: No focal deficit present.      Mental Status: She is alert and oriented to person, place, and time. Mental status is at baseline.      Sensory: No sensory deficit.      Motor: No weakness.   Psychiatric:         Mood and Affect: Mood normal.         Behavior: Behavior normal.                Medications in the Emergency Department:  Medications   sodium chloride 0.9 % flush 10 mL (has no administration in time range)   sodium chloride 0.9 % flush 10 mL (has no administration in time range)   ketorolac (TORADOL) injection 30 mg (30 mg Intravenous Given 11/25/22 0124)   morphine injection 4 mg (4 mg Intravenous Given 11/25/22 0124)        Labs  Lab Results (last 24 hours)     Procedure Component Value Units Date/Time    CBC & Differential [[id number]]  (Abnormal) Collected: 11/24/22 2239    Specimen: Blood Updated: 11/24/22 2244    Narrative:      The following orders were created for panel order CBC & Differential.  Procedure                               Abnormality         Status                     ---------                               -----------         ------                     CBC Auto Differential[[id number]]        Abnormal            Final result                 Please view results for these tests on the individual orders.    Comprehensive Metabolic Panel [[id number]]  (Abnormal) Collected: 11/24/22 2239    Specimen: Blood Updated: 11/24/22 2315     Glucose 118 mg/dL      BUN 13 mg/dL      Creatinine 1.15 mg/dL      Sodium 137 mmol/L      Potassium 4.5 mmol/L      Chloride 99 mmol/L      CO2 26.3 mmol/L      Calcium 10.2 mg/dL      Total Protein 7.7 g/dL      Albumin 4.20 g/dL       ALT (SGPT) 45 U/L      AST (SGOT) 44 U/L      Alkaline Phosphatase 88 U/L      Total Bilirubin 0.3 mg/dL      Globulin 3.5 gm/dL      A/G Ratio 1.2 g/dL      BUN/Creatinine Ratio 11.3     Anion Gap 11.7 mmol/L      eGFR 61.5 mL/min/1.73      Comment: National Kidney Foundation and American Society of Nephrology (ASN) Task Force recommended calculation based on the Chronic Kidney Disease Epidemiology Collaboration (CKD-EPI) equation refit without adjustment for race.       Narrative:      GFR Normal >60  Chronic Kidney Disease <60  Kidney Failure <15      Lipase [[id number]]  (Normal) Collected: 11/24/22 2239    Specimen: Blood Updated: 11/24/22 2315     Lipase 30 U/L     hCG, Quantitative, Pregnancy [[id number]] Collected: 11/24/22 2239    Specimen: Blood Updated: 11/24/22 2307     HCG Quantitative <0.50 mIU/mL     Narrative:      HCG Ranges by Gestational Age    Females - non-pregnant premenopausal   </= 1mIU/mL HCG  Females - postmenopausal               </= 7mIU/mL HCG    3 Weeks       5.4   -      72 mIU/mL  4 Weeks      10.2   -     708 mIU/mL  5 Weeks       217   -   8,245 mIU/mL  6 Weeks       152   -  32,177 mIU/mL  7 Weeks     4,059   - 153,767 mIU/mL  8 Weeks    31,366   - 149,094 mIU/mL  9 Weeks    59,109   - 135,901 mIU/mL  10 Weeks   44,186   - 170,409 mIU/mL  12 Weeks   27,107   - 201,615 mIU/mL  14 Weeks   24,302   -  93,646 mIU/mL  15 Weeks   12,540   -  69,747 mIU/mL  16 Weeks    8,904   -  55,332 mIU/mL  17 Weeks    8,240   -  51,793 mIU/mL  18 Weeks    9,649   -  55,271 mIU/mL    Results may be falsely decreased if patient taking Biotin.      CBC Auto Differential [[id number]]  (Abnormal) Collected: 11/24/22 2239    Specimen: Blood Updated: 11/24/22 2244     WBC 14.52 10*3/mm3      RBC 4.71 10*6/mm3      Hemoglobin 14.0 g/dL      Hematocrit 43.0 %      MCV 91.3 fL      MCH 29.7 pg      MCHC 32.6 g/dL      RDW 13.0 %      RDW-SD 43.3 fl      MPV 9.8 fL      Platelets 465 10*3/mm3      Neutrophil  % 67.8 %      Lymphocyte % 21.4 %      Monocyte % 6.8 %      Eosinophil % 2.8 %      Basophil % 0.8 %      Immature Grans % 0.4 %      Neutrophils, Absolute 9.84 10*3/mm3      Lymphocytes, Absolute 3.11 10*3/mm3      Monocytes, Absolute 0.99 10*3/mm3      Eosinophils, Absolute 0.40 10*3/mm3      Basophils, Absolute 0.12 10*3/mm3      Immature Grans, Absolute 0.06 10*3/mm3      nRBC 0.0 /100 WBC     Urinalysis With Microscopic If Indicated (No Culture) - Urine, Clean Catch [[id number]]  (Abnormal) Collected: 11/25/22 0127    Specimen: Urine, Clean Catch Updated: 11/25/22 0143     Color, UA Yellow     Appearance, UA Clear     pH, UA 6.0     Specific Gravity, UA 1.020     Glucose, UA Negative     Ketones, UA Negative     Bilirubin, UA Negative     Blood, UA Moderate (2+)     Protein, UA Trace     Leuk Esterase, UA Negative     Nitrite, UA Negative     Urobilinogen, UA 0.2 E.U./dL    Urinalysis, Microscopic Only - Urine, Clean Catch [[id number]]  (Abnormal) Collected: 11/25/22 0127    Specimen: Urine, Clean Catch Updated: 11/25/22 0143     RBC, UA 6-12 /HPF      WBC, UA 0-2 /HPF      Bacteria, UA 1+ /HPF      Squamous Epithelial Cells, UA 3-6 /HPF      Hyaline Casts, UA 3-6 /LPF      Methodology Automated Microscopy           Imaging:  CT Abdomen Pelvis Without Contrast    Result Date: 11/25/2022  PROCEDURE: CT ABDOMEN PELVIS WO CONTRAST  COMPARISON: 11/17/2017.  INDICATIONS: RIGHT FLANK PAIN; DIFFICULTY URINATING X 1 DAY.  TECHNIQUE: 758 CT images were created without intravenous contrast.   PROTOCOL:   Standard CT imaging protocol performed.    RADIATION:   Total DLP: 1,491.7 mGy*cm.   Automated exposure control was utilized to minimize radiation dose.  FINDINGS: There is obstructive uropathy on the right due to a 5 mm distal right ureteral calculus, which is just upstream from the right ureterovesical junction (UVJ).  There is moderate-to-severe right hydronephrosis and right hydroureter associated with the findings.   Nonobstructing right-sided renal calyceal stones are seen, which measure about 8 mm in greatest diameter.  There also left-sided renal calyceal stones, which measure about 7 mm in greatest size.  No left hydronephrosis or obstructive uropathy.  No left ureterolithiasis.  No definite urinary bladder calculi are seen.  A mixed attenuation left adnexal mass is seen, measuring about 4.2 cm, as on image 175 of series 201.  It is most suggestive of a dermoid cyst.  It is new since the prior study.  The appendix is not clearly identified.  It may be surgically absent.  There are sclerotic changes of the sacroiliac joints.  Otherwise, no acute findings are appreciated.        1. There is obstructive uropathy on the right due to a 5 mm distal right ureteral calculus, which is just upstream from the ureterovesical junction (UVJ) with moderate-to-severe right hydronephrosis and right hydroureter.  2. Bilateral nonobstructing renal calyceal stones are seen which measure about 8 mm in greatest diameter.  3. No hydronephrosis or obstructive uropathy is seen on the left. 4. Please see above comments for further detail.   1.  Please note that portions of this note were completed with a voice recognition program.  YANNICK PRASAD JR, MD       Electronically Signed and Approved By: YANNICK PRASAD JR, MD on 11/25/2022 at 1:06                Procedures:  Procedures    Progress                            Medical Decision Making:  MDM             Final diagnoses:   Ureterolithiasis   Dermoid cyst of left ovary        Disposition:  ED Disposition     ED Disposition   Discharge    Condition   Stable    Comment   --             Documentation assistance provided by Adam Rogers acting as scribe for Chetan Jo DO. Information recorded by the scribe was done at my direction and has been verified and validated by me.        Adam Rogers  11/25/22 0035       Chetan Jo DO  11/25/22 [id number]

## 2022-11-25 NOTE — DISCHARGE INSTRUCTIONS
Please strain your urine until you pass the kidney stone.  If you passed the kidney stone please take it to your an appointment with urology    Please review CAT scan performed today with your gynecologist.  Please discuss the possible dermoid left ovarian cyst and discuss need for follow-up ultrasound    Return to emergency room for intractable pain, intractable vomiting, fever, near passing out, passing out, altered mental status or any new symptoms you are concerned with

## 2022-11-28 ENCOUNTER — TELEPHONE (OUTPATIENT)
Dept: UROLOGY | Facility: CLINIC | Age: 42
End: 2022-11-28

## 2022-11-28 DIAGNOSIS — N20.0 NEPHROLITHIASIS: Primary | ICD-10-CM

## 2022-11-28 NOTE — TELEPHONE ENCOUNTER
Spoke to patient regarding referral received for kidney stones. Patient said she has passed the 5mm stone and is not having as much discomfort on the right side anymore. Patient would like to schedule appt in a few weeks with RIMA prior to ensure proper healing and presence of stones. Patient agreed to come in 01/09/23 at 0845 with RIMA prior. RIMA order placed and patient is aware hospital will call her to schedule this.

## 2022-12-01 LAB — DEXAMETHASONE SERPL-MCNC: 451 NG/DL

## 2022-12-21 RX ORDER — ALBUTEROL SULFATE 90 UG/1
AEROSOL, METERED RESPIRATORY (INHALATION)
Qty: 18 G | Refills: 2 | Status: SHIPPED | OUTPATIENT
Start: 2022-12-21

## 2023-01-03 ENCOUNTER — HOSPITAL ENCOUNTER (OUTPATIENT)
Dept: ULTRASOUND IMAGING | Facility: HOSPITAL | Age: 43
Discharge: HOME | End: 2023-01-03
Admitting: UROLOGY
Payer: COMMERCIAL

## 2023-01-03 DIAGNOSIS — N20.0 NEPHROLITHIASIS: ICD-10-CM

## 2023-01-03 PROCEDURE — 76775 US EXAM ABDO BACK WALL LIM: CPT

## 2023-01-16 RX ORDER — CHOLECALCIFEROL (VITAMIN D3) 125 MCG
CAPSULE ORAL
Qty: 30 TABLET | Refills: 5 | Status: SHIPPED | OUTPATIENT
Start: 2023-01-16

## 2023-01-16 RX ORDER — MONTELUKAST SODIUM 10 MG/1
TABLET ORAL
Qty: 30 TABLET | Refills: 5 | Status: SHIPPED | OUTPATIENT
Start: 2023-01-16

## 2023-01-16 RX ORDER — CETIRIZINE HYDROCHLORIDE 10 MG/1
TABLET ORAL
Qty: 30 TABLET | Refills: 5 | Status: SHIPPED | OUTPATIENT
Start: 2023-01-16

## 2023-01-16 RX ORDER — METOPROLOL SUCCINATE 50 MG/1
TABLET, EXTENDED RELEASE ORAL
Qty: 30 TABLET | Refills: 5 | Status: SHIPPED | OUTPATIENT
Start: 2023-01-16

## 2023-01-16 RX ORDER — LEVOTHYROXINE, LIOTHYRONINE 76; 18 UG/1; UG/1
TABLET ORAL
Qty: 30 TABLET | Refills: 5 | Status: SHIPPED | OUTPATIENT
Start: 2023-01-16

## 2023-01-18 ENCOUNTER — TELEPHONE (OUTPATIENT)
Dept: UROLOGY | Facility: CLINIC | Age: 43
End: 2023-01-18
Payer: COMMERCIAL

## 2023-01-18 NOTE — TELEPHONE ENCOUNTER
LVM asking pt to call back to discuss upcoming appt 01/24/23. Pt has passed stone, RIMA was completely negative. Therefore, appt with Barakat is not needed. If she still wants an appt, it would only be to discuss kidney stone prevention. We can move her to NP schedule for this if she wants the education. If not, we can cancel the referral.

## 2023-01-18 NOTE — TELEPHONE ENCOUNTER
Pt called back and was given the message. She does not feel like she needs an appt for education if the renal us was negative. I told her she could always call back if she has trouble with stones in the future.

## 2023-01-19 ENCOUNTER — TELEPHONE (OUTPATIENT)
Dept: UROLOGY | Facility: CLINIC | Age: 43
End: 2023-01-19
Payer: COMMERCIAL

## 2023-01-19 NOTE — TELEPHONE ENCOUNTER
Called pt to see how she is doing. She said she is doing well and not having any pain. She has no abnormal s/s at all. She would like to move appt out. Scheduled her for 02/28/23 at 0845. She is aware to call with new/worsening s/s and we could possibly order imaging or ua.    ----- Message from Onesimo Barakat MD sent at 1/18/2023  2:04 PM EST -----  Does need to be seen by me because of the large stone in her kidney.  If she is having pain I should see her, if she passed her stone and is not having stone I could see her not emergently  ----- Message -----  From: Adam Bird RN  Sent: 1/18/2023  11:21 AM EST  To: Onesimo Barakat MD    Will you review CT and RIMA and let me know if this pt needs appt with you 01/24? I offered her education for kidney stone prevention since RIMA was negative. Thanks

## 2023-02-25 PROBLEM — N20.0 NEPHROLITHIASIS: Status: ACTIVE | Noted: 2023-02-25

## 2023-02-25 NOTE — PROGRESS NOTES
Chief Complaint: Urologic complaint    Subjective         History of Present Illness  Beena Osborne is a 42 y.o. female       Nephrolithiasis    1/23 renal ultrasound-negative    Patient passed a 5 mm stone in November.    No gross hematuria or pain currently.    11/25/2022 CT abdomen/pelvis without - 5 mm distal right UVJ stone.  5 mm nonobstructing stone on the left, 8 mm lower pole nonobstructing stone on the right.  Images reviewed    2/23 serum calcium 10.5       has passed 3 kidney stone spontaneously, no surgeries    Father with history of bladder cancer and nephrolithiasis, brother with nephrolithiasis.       no history of urologic surgery.    No cardiopulmonary history.  Non-smoker.  No anticoagulation                Objective     Past Medical History:   Diagnosis Date   • ADHD (attention deficit hyperactivity disorder)    • Allergic    • Allergic rhinitis 07/10/2015    WILL ADD FLONASE 2 CETIRIZINE   • Anemia    • Anxiety    • Asthma    • B12 deficiency    • Depression    • Difficulty concentrating 07/15/2016    WILL TRY STRATERA   • Graves disease 07/10/2015   • HLD (hyperlipidemia) 07/10/2015    WILL CHECK LABS AND ADJUST MEDS ACCORDINGLY   • Hyperlipidemia LDL goal <100 7/10/2015   • Hypertension    • Hyperthyroidism    • Inappropriate sinus tachycardia 10/7/2021    05/03/21 Holter:  1.  Normal average heart rate of 98 beats per minute. 2.  There was one PVC recorded.    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   • Visual impairment    • Vitamin D deficiency        Past Surgical History:   Procedure  Laterality Date   • APPENDECTOMY  1984   • CYST REMOVAL      NECK   • INCISION AND DRAINAGE ABSCESS      NECK   • OTHER SURGICAL HISTORY      thyroid ablation   • OVARIAN CYST REMOVAL  1999   • SEPTOPLASTY  2012   • SINUS SURGERY  2012         Current Outpatient Medications:   •  Adalimumab (Humira Pen) 40 MG/0.4ML Pen-injector Kit, Inject 40 mg under the skin into the appropriate area as directed Every 14 (Fourteen) Days., Disp: 2 each, Rfl: 1  •  atorvastatin (LIPITOR) 20 MG tablet, Take 1 tablet by mouth Every Night., Disp: 30 tablet, Rfl: 3  •  azelastine (ASTELIN) 0.1 % nasal spray, , Disp: , Rfl:   •  cetirizine (zyrTEC) 10 MG tablet, TAKE ONE TABLET BY MOUTH ONCE DAILY, Disp: 30 tablet, Rfl: 5  •  Cholecalciferol (Vitamin D3) 50 MCG (2000 UT) capsule, Take 1 capsule by mouth Daily., Disp: 30 capsule, Rfl: 5  •  Cholecalciferol (Vitamin D3) 50 MCG (2000 UT) tablet, TAKE ONE TABLET BY MOUTH ONCE DAILY, Disp: 30 tablet, Rfl: 5  •  dexamethasone (DECADRON) 1 MG tablet, Take 1 tablet when directed, Disp: , Rfl:   •  folic acid (FOLVITE) 1 MG tablet, Take 1 tablet by mouth Daily., Disp: 90 tablet, Rfl: 3  •  glucose blood test strip, BID fasting and 2hr after largest meal E11.9, Disp: 180 each, Rfl: 2  •  glucose monitor monitoring kit, 1 each 2 (Two) Times a Day. E11.9 BID fasting and 2hr after largest meal, Disp: 1 each, Rfl: 0  •  hydrOXYzine (ATARAX) 50 MG tablet, Take 1 tablet by mouth Every Night., Disp: 30 tablet, Rfl: 0  •  Insulin Pen Needle (Pen Needles) 32G X 4 MM misc, 1 each 2 (Two) Times a Day., Disp: 100 each, Rfl: 2  •  Iodine Strong, Lug[place], (IODINE STRONG PO), Take  by mouth Daily., Disp: , Rfl:   •  Jaimiess 0.15-0.03 &0.01 MG tablet, Take 1 tablet by mouth Daily., Disp: 84 each, Rfl: 3  •  Lancets 28G misc, 1 each 2 (Two) Times a Day., Disp: 180 each, Rfl: 1  •  Liraglutide (VICTOZA) 18 MG/3ML solution pen-injector injection, Inject 0.6 mg under the skin into the appropriate area as directed  "Daily for 90 days., Disp: 3 pen, Rfl: 1  •  losartan (COZAAR) 50 MG tablet, Take 1 tablet by mouth Daily., Disp: 30 tablet, Rfl: 5  •  metoprolol succinate XL (TOPROL-XL) 50 MG 24 hr tablet, TAKE ONE TABLET BY MOUTH ONCE DAILY, Disp: 30 tablet, Rfl: 5  •  montelukast (SINGULAIR) 10 MG tablet, TAKE ONE TABLET BY MOUTH EVERY NIGHT AT BEDTIME, Disp: 30 tablet, Rfl: 5  •  NP Thyroid 120 MG tablet, TAKE ONE TABLET BY MOUTH ONCE DAILY, Disp: 30 tablet, Rfl: 5  •  predniSONE (DELTASONE) 5 MG tablet, As Needed., Disp: , Rfl:   •  Selenium 200 MCG tablet, Take  by mouth Daily., Disp: , Rfl:   •  thyroid (NP Thyroid) 15 MG tablet, Take 1 tablet by mouth Daily., Disp: 30 tablet, Rfl: 5  •  Ventolin  (90 Base) MCG/ACT inhaler, INHALE 2 PUFFS EVERY 4 (FOUR) HOURS AS NEEDED FOR WHEEZING., Disp: 18 g, Rfl: 2  •  Zinc 15 MG capsule, Take 15 mg by mouth Daily., Disp: , Rfl:     Allergies   Allergen Reactions   • Meperidine Hallucinations   • Tetanus Immune Globulin GI Intolerance   • Amlodipine Diarrhea   • Ondansetron Hcl Other (See Comments)     Fever     • Tetanus Toxoid Other (See Comments)   • Zofran [Ondansetron] GI Intolerance   • Barley Grass Other (See Comments)   • Metformin Other (See Comments)     Did not work     • Molds & Smuts Other (See Comments)   • Ozempic (0.25 Or 0.5 Mg-Dose) [Semaglutide(0.25 Or 0.5mg-Dos)] Other (See Comments)     \"brain fog\"    • Victoza [Liraglutide] GI Intolerance        Family History   Problem Relation Age of Onset   • COPD Father    • Cancer Father    • Osteoporosis Father    • Arrhythmia Father    • Hypothyroidism Father    • Mental illness Maternal Grandmother    • Stomach cancer Maternal Grandfather    • Alcohol abuse Maternal Grandfather    • Mental illness Maternal Grandfather    • Other Mother         cognitave impairment   • Osteopenia Mother    • Hypothyroidism Mother    • Mental illness Mother    • Testicular cancer Brother    • Lung cancer Paternal Grandmother    • " Alcohol abuse Maternal Uncle    • Alcohol abuse Paternal Uncle    • Alcohol abuse Sister    • Mental illness Paternal Uncle    • Mental illness Sister    • Mental illness Sister    • Mental illness Brother        Social History     Socioeconomic History   • Marital status: Single   Tobacco Use   • Smoking status: Never   • Smokeless tobacco: Never   Vaping Use   • Vaping Use: Never used   Substance and Sexual Activity   • Alcohol use: Not Currently     Comment: rarely   • Drug use: Never   • Sexual activity: Never       Vital Signs:   There were no vitals taken for this visit.     Physical exam    Alert and orient x3  Well appearing, well developed, in no acute distress   Unlabored respirations  Nontender/nondistended    Grossly oriented to person, place and time, judgment is intact, normal mood and affect             Assessment and Plan    Diagnoses and all orders for this visit:    1. Nephrolithiasis (Primary)      Records reviewed today and summarized in the chart  CT images and read reviewed and discussed with the patient    Patient passed her ureteral stone -  she still has some nonobstructing stones bilaterally.  We discussed removal versus conservative monitoring.  At this time patient would like to conservatively monitor.      Patient's serum calcium was normal we will order parathyroid hormone       Patient brings stone today we will send for stone analysis    Follow-up with nurse practitioner in 2 years with [facility] to monitor stone.

## 2023-02-28 ENCOUNTER — LAB (OUTPATIENT)
Dept: LAB | Facility: HOSPITAL | Age: 43
End: 2023-02-28
Payer: COMMERCIAL

## 2023-02-28 ENCOUNTER — OFFICE VISIT (OUTPATIENT)
Dept: UROLOGY | Facility: CLINIC | Age: 43
End: 2023-02-28
Payer: COMMERCIAL

## 2023-02-28 VITALS — HEIGHT: 65 IN | BODY MASS INDEX: 46.82 KG/M2 | WEIGHT: 281 LBS

## 2023-02-28 DIAGNOSIS — N20.0 NEPHROLITHIASIS: Primary | ICD-10-CM

## 2023-02-28 DIAGNOSIS — N20.0 NEPHROLITHIASIS: ICD-10-CM

## 2023-02-28 PROCEDURE — 82365 CALCULUS SPECTROSCOPY: CPT | Performed by: UROLOGY

## 2023-02-28 PROCEDURE — 88300 SURGICAL PATH GROSS: CPT | Performed by: UROLOGY

## 2023-02-28 PROCEDURE — 99203 OFFICE O/P NEW LOW 30 MIN: CPT | Performed by: UROLOGY

## 2023-02-28 PROCEDURE — 83970 ASSAY OF PARATHORMONE: CPT

## 2023-02-28 PROCEDURE — 36415 COLL VENOUS BLD VENIPUNCTURE: CPT

## 2023-03-01 LAB — PTH-INTACT SERPL-MCNC: 21.4 PG/ML (ref 15–65)

## 2023-03-02 LAB
LAB AP CASE REPORT: NORMAL
LAB AP CLINICAL INFORMATION: NORMAL
PATH REPORT.FINAL DX SPEC: NORMAL
PATH REPORT.GROSS SPEC: NORMAL

## 2023-03-09 DIAGNOSIS — D21.9 FIBROIDS: Primary | ICD-10-CM

## 2023-03-09 DIAGNOSIS — Z87.42 HISTORY OF OVARIAN CYST: ICD-10-CM

## 2023-03-09 LAB
CALCIUM OXALATE DIHYDRATE MFR STONE IR: 20 %
COLOR STONE: NORMAL
COM MFR STONE: 80 %
COMPN STONE: NORMAL
LABORATORY COMMENT REPORT: NORMAL
Lab: NORMAL
Lab: NORMAL
PHOTO: NORMAL
SIZE STONE: NORMAL MM
SPEC SOURCE SUBJ: NORMAL
WT STONE: 30 MG

## 2023-04-03 RX ORDER — LANCETS 28 GAUGE
EACH MISCELLANEOUS
Qty: 100 EACH | Refills: 1 | Status: SHIPPED | OUTPATIENT
Start: 2023-04-03

## 2023-04-03 RX ORDER — ATORVASTATIN CALCIUM 20 MG/1
20 TABLET, FILM COATED ORAL NIGHTLY
Qty: 30 TABLET | Refills: 3 | Status: SHIPPED | OUTPATIENT
Start: 2023-04-03

## 2023-06-12 ENCOUNTER — OFFICE VISIT (OUTPATIENT)
Dept: FAMILY MEDICINE CLINIC | Facility: CLINIC | Age: 43
End: 2023-06-12
Payer: COMMERCIAL

## 2023-06-12 VITALS
OXYGEN SATURATION: 100 % | DIASTOLIC BLOOD PRESSURE: 74 MMHG | WEIGHT: 268 LBS | BODY MASS INDEX: 45.75 KG/M2 | HEART RATE: 60 BPM | TEMPERATURE: 98.1 F | SYSTOLIC BLOOD PRESSURE: 124 MMHG | HEIGHT: 64 IN

## 2023-06-12 DIAGNOSIS — Z12.31 ENCOUNTER FOR SCREENING MAMMOGRAM FOR MALIGNANT NEOPLASM OF BREAST: ICD-10-CM

## 2023-06-12 DIAGNOSIS — K52.9 CHRONIC DIARRHEA: ICD-10-CM

## 2023-06-12 DIAGNOSIS — G93.9 BRAIN LESION: ICD-10-CM

## 2023-06-12 DIAGNOSIS — Z80.8 FAMILY HISTORY OF BRAIN CANCER: ICD-10-CM

## 2023-06-12 DIAGNOSIS — E03.8 SECONDARY HYPOTHYROIDISM: ICD-10-CM

## 2023-06-12 DIAGNOSIS — Z80.1 FAMILY HISTORY OF LUNG CANCER: ICD-10-CM

## 2023-06-12 DIAGNOSIS — E78.2 MIXED HYPERLIPIDEMIA: ICD-10-CM

## 2023-06-12 DIAGNOSIS — Z80.0 FAMILY HISTORY OF LIVER CANCER: ICD-10-CM

## 2023-06-12 DIAGNOSIS — D49.7 PITUITARY TUMOR: ICD-10-CM

## 2023-06-12 DIAGNOSIS — Z00.00 ANNUAL PHYSICAL EXAM: Primary | ICD-10-CM

## 2023-06-12 DIAGNOSIS — E66.01 MORBID OBESITY WITH BMI OF 45.0-49.9, ADULT: ICD-10-CM

## 2023-06-12 DIAGNOSIS — I10 ESSENTIAL HYPERTENSION: ICD-10-CM

## 2023-06-12 DIAGNOSIS — Z80.3 FAMILY HISTORY OF BREAST CANCER: ICD-10-CM

## 2023-06-12 DIAGNOSIS — E55.9 VITAMIN D DEFICIENCY: ICD-10-CM

## 2023-06-12 LAB
CHOLEST SERPL-MCNC: 149 MG/DL (ref 0–200)
HDLC SERPL-MCNC: 56 MG/DL (ref 40–60)
LDLC SERPL CALC-MCNC: 77 MG/DL (ref 0–100)
LDLC/HDLC SERPL: 1.36 {RATIO}
TRIGL SERPL-MCNC: 83 MG/DL (ref 0–150)
VLDLC SERPL-MCNC: 16 MG/DL (ref 5–40)

## 2023-06-12 PROCEDURE — 80061 LIPID PANEL: CPT | Performed by: NURSE PRACTITIONER

## 2023-06-12 RX ORDER — SUMATRIPTAN 100 MG/1
TABLET, FILM COATED ORAL
COMMUNITY
Start: 2023-01-31

## 2023-06-12 RX ORDER — TOPIRAMATE 50 MG/1
50 TABLET, FILM COATED ORAL
COMMUNITY

## 2023-06-12 RX ORDER — BECLOMETHASONE DIPROPIONATE 80 UG/1
AEROSOL, METERED NASAL
COMMUNITY
Start: 2023-05-02

## 2023-06-12 NOTE — PROGRESS NOTES
"  ENCOUNTER DATE:  06/12/2023    Beena Osborne / 42 y.o. / female      CHIEF COMPLAINT     referral request (Neurosurgery), mammogram, and genetic testing (Family history of various cancer)      VITALS     Visit Vitals  /74 (BP Location: Left arm, Patient Position: Sitting, Cuff Size: Adult)   Pulse 60   Temp 98.1 °F (36.7 °C) (Temporal)   Ht 162.6 cm (64\")   Wt 122 kg (268 lb)   SpO2 100%   BMI 46.00 kg/m²       BP Readings from Last 3 Encounters:   06/12/23 124/74   11/24/22 131/83   10/17/22 118/86     Wt Readings from Last 3 Encounters:   06/12/23 122 kg (268 lb)   02/28/23 127 kg (281 lb)   11/24/22 133 kg (292 lb 5.3 oz)      Body mass index is 46 kg/m².    MEDICATIONS     Current Outpatient Medications on File Prior to Visit   Medication Sig Dispense Refill    Adalimumab (Humira Pen) 40 MG/0.4ML Pen-injector Kit Inject 40 mg under the skin into the appropriate area as directed Every 14 (Fourteen) Days. 2 each 1    atorvastatin (LIPITOR) 20 MG tablet TAKE 1 TABLET BY MOUTH EVERY NIGHT. 30 tablet 3    azelastine (ASTELIN) 0.1 % nasal spray       cetirizine (zyrTEC) 10 MG tablet TAKE ONE TABLET BY MOUTH ONCE DAILY 30 tablet 5    Cholecalciferol (Vitamin D3) 50 MCG (2000 UT) capsule Take 1 capsule by mouth Daily. 30 capsule 5    folic acid (FOLVITE) 1 MG tablet Take 1 tablet by mouth Daily. 90 tablet 3    glucose blood test strip BID fasting and 2hr after largest meal E11.9 180 each 2    glucose monitor monitoring kit 1 each 2 (Two) Times a Day. E11.9 BID fasting and 2hr after largest meal 1 each 0    Lancets (freestyle) lancets USE TO TEST BLOOD SUGAR TWO TIMES A  each 1    losartan (COZAAR) 50 MG tablet Take 1 tablet by mouth Daily. 30 tablet 5    metoprolol succinate XL (TOPROL-XL) 50 MG 24 hr tablet TAKE ONE TABLET BY MOUTH ONCE DAILY 30 tablet 5    montelukast (SINGULAIR) 10 MG tablet TAKE ONE TABLET BY MOUTH EVERY NIGHT AT BEDTIME 30 tablet 5    NP Thyroid 120 MG tablet TAKE ONE TABLET BY " MOUTH ONCE DAILY (Patient taking differently: Take 1 tablet by mouth. Pt reports taking Monday - Friday, none on Saturday/Sunday) 30 tablet 5    predniSONE (DELTASONE) 5 MG tablet As Needed.      Qnasl 80 MCG/ACT aerosol solution       SUMAtriptan (IMITREX) 100 MG tablet       topiramate (TOPAMAX) 50 MG tablet Take 1 tablet by mouth. Pt reports taking 2.5 tabs (125mg)      Ventolin  (90 Base) MCG/ACT inhaler INHALE 2 PUFFS EVERY 4 (FOUR) HOURS AS NEEDED FOR WHEEZING. 18 g 2    Liraglutide (VICTOZA) 18 MG/3ML solution pen-injector injection Inject 0.6 mg under the skin into the appropriate area as directed Daily for 90 days. 3 pen 1    [DISCONTINUED] Cholecalciferol (Vitamin D3) 50 MCG (2000 UT) tablet TAKE ONE TABLET BY MOUTH ONCE DAILY 30 tablet 5    [DISCONTINUED] dexamethasone (DECADRON) 1 MG tablet Take 1 tablet when directed (Patient not taking: Reported on 6/12/2023)      [DISCONTINUED] hydrOXYzine (ATARAX) 50 MG tablet Take 1 tablet by mouth Every Night. (Patient not taking: Reported on 6/12/2023) 30 tablet 0    [DISCONTINUED] Insulin Pen Needle (Pen Needles) 32G X 4 MM misc 1 each 2 (Two) Times a Day. (Patient not taking: Reported on 6/12/2023) 100 each 2    [DISCONTINUED] Iodine Strong, Lugols, (IODINE STRONG PO) Take  by mouth Daily. (Patient not taking: Reported on 6/12/2023)      [DISCONTINUED] Selenium 200 MCG tablet Take  by mouth Daily. (Patient not taking: Reported on 6/12/2023)      [DISCONTINUED] thyroid (NP Thyroid) 15 MG tablet Take 1 tablet by mouth Daily. (Patient not taking: Reported on 6/12/2023) 30 tablet 5    [DISCONTINUED] Zinc 15 MG capsule Take 15 mg by mouth Daily. (Patient not taking: Reported on 6/12/2023)       No current facility-administered medications on file prior to visit.         HISTORY OF PRESENT ILLNESS     Beena presents for annual health maintenance visit.  No results found for: HPVAPTIMA   Last health maintenance visit: approximately 1 year ago  General health:  multiple medical problems  Lifestyle:  Attempting to lose weight?: Yes   Diet: eats moderately healthy  Exercise: walks regularly  Tobacco: Never used   Alcohol: does not drink  Work: Full-time  Reproductive health:  Sexually active?: No   Sexual problems?: No problems  Concern for STD?: No    Sees Gynecologist?: Yes   Luz/Postmenopausal?: No   Domestic abuse concerns: No   Depression Screening:          PHQ-9 Depression Screening  Little interest or pleasure in doing things? 0-->not at all   Feeling down, depressed, or hopeless? 1-->several days   Trouble falling or staying asleep, or sleeping too much?     Feeling tired or having little energy?     Poor appetite or overeating?     Feeling bad about yourself - or that you are a failure or have let yourself or your family down?     Trouble concentrating on things, such as reading the newspaper or watching television?     Moving or speaking so slowly that other people could have noticed? Or the opposite - being so fidgety or restless that you have been moving around a lot more than usual?     Thoughts that you would be better off dead, or of hurting yourself in some way?     PHQ-9 Total Score 1   If you checked off any problems, how difficult have these problems made it for you to do your work, take care of things at home, or get along with other people?           [id number]           Patient Care Team:  Renee Hogue APRN as PCP - General (Nurse Practitioner)  Onesimo Barakat MD as Consulting Physician (Urology)      ALLERGIES  Allergies   Allergen Reactions    Meperidine Hallucinations    Tetanus Immune Globulin GI Intolerance    Amlodipine Diarrhea    Ondansetron Hcl Other (See Comments)     Fever      Tetanus Toxoid Other (See Comments)    Zofran [Ondansetron] GI Intolerance    Barley Grass Other (See Comments)    Metformin Other (See Comments)     Did not work      Molds & Smuts Other (See Comments)    Ozempic (0.25 Or 0.5 Mg-Dose) [Semaglutide(0.25 Or  "0.5mg-Dos)] Other (See Comments)     \"brain fog\"     Victoza [Liraglutide] GI Intolerance        PFSH:     The following portions of the patient's history were reviewed and updated as appropriate: Allergies / Current Medications / Past Medical History / Surgical History / Social History / Family History    PROBLEM LIST   Patient Active Problem List   Diagnosis    Allergic rhinitis    Anemia    Anxiety    Asthma    B12 deficiency    Depression    Difficulty concentrating    Graves' disease in remission    Hyperlipidemia LDL goal <100    Type 2 diabetes mellitus with hyperglycemia, without long-term current use of insulin    Morbid obesity    Polycystic ovaries    Primary insomnia    RA (rheumatoid arthritis)    Psoriatic arthritis    Secondary hypothyroidism    Vitamin D deficiency    Essential hypertension    Inappropriate sinus tachycardia    Morbid obesity with body mass index (BMI) of 40.0 or higher    Pituitary lesion    Bilateral occipital neuralgia    Family history of osteoporosis    Long term systemic steroid user    Nephrolithiasis       PAST MEDICAL HISTORY  Past Medical History:   Diagnosis Date    ADHD (attention deficit hyperactivity disorder)     Allergic     Allergic rhinitis 07/10/2015    WILL ADD FLONASE 2 CETIRIZINE    Anemia     Anxiety     Asthma     B12 deficiency     Depression     Difficulty concentrating 07/15/2016    WILL TRY STRATERA    Graves disease 07/10/2015    HLD (hyperlipidemia) 07/10/2015    WILL CHECK LABS AND ADJUST MEDS ACCORDINGLY    Hyperlipidemia LDL goal <100 07/10/2015    Hypertension     Hyperthyroidism     Inappropriate sinus tachycardia 10/07/2021    05/03/21 Holter:  1.  Normal average heart rate of 98 beats per minute. 2.  There was one PVC recorded.     Low back pain     Migraines     Morbid obesity     PCOS (polycystic ovarian syndrome)     Pneumonia     Primary insomnia 10/25/2015    WILL REFER TO SLEEP STUDY    Psoriasis 07/10/2015    SEEMS TO BE IMPROVING, I " BELIEVE SHE WAS ON STERIODS AT THE HOSPITAL WHICH IS NO LONGER ON, I DO NO WANT HER TO BE ON STERIODS FURTHER UNTIL HER BLOOL PRESSURE IS WELL CONTROLLLED    RA (rheumatoid arthritis)     Secondary hypothyroidism 07/10/2015    Visual impairment     Vitamin D deficiency        SURGICAL HISTORY  Past Surgical History:   Procedure Laterality Date    APPENDECTOMY  1984    CYST REMOVAL      NECK    INCISION AND DRAINAGE ABSCESS      NECK    OTHER SURGICAL HISTORY      thyroid ablation    OVARIAN CYST REMOVAL  1999    SEPTOPLASTY  2012    SINUS SURGERY  2012       SOCIAL HISTORY  Social History     Socioeconomic History    Marital status: Single   Tobacco Use    Smoking status: Never    Smokeless tobacco: Never   Vaping Use    Vaping Use: Never used   Substance and Sexual Activity    Alcohol use: Not Currently     Comment: rarely    Drug use: Never    Sexual activity: Never       FAMILY HISTORY  Family History   Problem Relation Age of Onset    COPD Father     Cancer Father     Osteoporosis Father     Arrhythmia Father     Hypothyroidism Father     Arthritis Father     Thyroid disease Father     Vision loss Father     Mental illness Maternal Grandmother     Thyroid disease Maternal Grandmother     Stomach cancer Maternal Grandfather     Alcohol abuse Maternal Grandfather     Mental illness Maternal Grandfather     Other Mother         cognitave impairment    Osteopenia Mother     Hypothyroidism Mother     Mental illness Mother     Arthritis Mother     Hyperlipidemia Mother     Learning disabilities Mother     Thyroid disease Mother     Testicular cancer Brother     Lung cancer Paternal Grandmother     Arthritis Paternal Grandmother     Alcohol abuse Maternal Uncle     Alcohol abuse Paternal Uncle     Alcohol abuse Sister     Thyroid disease Sister     Mental illness Paternal Uncle     Mental illness Sister     Learning disabilities Sister     Thyroid disease Sister     Mental illness Sister     Mental illness Brother      Arthritis Brother     Thyroid disease Maternal Aunt        IMMUNIZATION HISTORY  Immunization History   Administered Date(s) Administered    COVID-19 (PFIZER) Purple Cap Monovalent 03/04/2021, 03/25/2021, 08/22/2021    FluLaval/Fluzone >6mos 11/23/2021    Hepatitis A 05/02/2018, 02/11/2019    Hepatitis B Adult/Adolescent IM 10/31/2019    Influenza, Unspecified 10/03/2019       Diarrhea   This is a chronic problem. The current episode started more than 1 year ago. The problem occurs 2 to 4 times per day. The problem has been unchanged. The stool consistency is described as Watery. The patient states that diarrhea does not awaken her from sleep. Pertinent negatives include no arthralgias, coughing or vomiting. Nothing aggravates the symptoms. There are no known risk factors. She has tried nothing for the symptoms. The treatment provided no relief.   Patient has a current history of Cushing syndrome, postoperative hypothyroidism, type 2 diabetes, she sees endocrinology in [facility].  Her diabetes are well controlled last A1c was done March 2023 it was 6.3. Most current labs were done May 2023, they remain stable at this time.  Patient comes in today needing a referral.  Patient needs a referral to neurosurgery, she would like to see Dr. Rubio, she has 3 brain lesions that are not being followed at this time.  1 lesions on the pituitary and the other 2 lesions are within the brain.  Patient seems to neurology for migraines.    Patient has multiple autoimmune disorders, and a very strong family history of cancers.  Per patient her father has a glioblastoma, and bladder cancer, she has multiple aunt and uncles especially on her father side that has had lung cancer, liver cancer, and brain cancer.  Patient is interested in seeing a  to see if she can get genetically checked for any type of cancer markers.  Patient is also interested in likely being checked for certain syndromes that could cause patient to  grow multiple tumors.  Patient states she has had tumors in bilateral breast.     Diabetes  She presents for her follow-up diabetic visit. She has type 2 diabetes mellitus. There are no hypoglycemic associated symptoms. There are no diabetic associated symptoms. Pertinent negatives for diabetes include no chest pain. There are no hypoglycemic complications. Risk factors for coronary artery disease include diabetes mellitus, dyslipidemia and obesity. Current diabetic treatments: was perscribed Farxiga but shortly stopped it due to concern that it would cause vaginal rash and hyperlipidemia. Her weight is fluctuating minimally. She is following a generally unhealthy diet. When asked about meal planning, she reported none. She has not had a previous visit with a dietitian. She never participates in exercise. Her overall blood glucose range is 140-180 mg/dl. An ACE inhibitor/angiotensin II receptor blocker is being taken. She does not see a podiatrist.Eye exam is not current.,  Se[facility]ocr[facility]ology at [facility]  Hyperlipidemia  This is a chronic problem. The current episode started more than 1 year ago. Recent lipid tests were reviewed and are variable. Exacerbating diseases include diabetes and obesity. Pertinent negatives include no chest pain, myalgias or shortness of breath. Current antihyperlipidemic treatment includes statins. Compliance problems: Patient stopped medication.  Risk factors for coronary artery disease include diabetes mellitus, dyslipidemia, hypertension and obesity.    Hypertension  This is a chronic problem. The current episode started more than 1 year ago. The problem has been waxing and waning since onset. Pertinent negatives include no anxiety, chest pain, peripheral edema or shortness of breath. Risk factors for coronary artery disease include diabetes mellitus, dyslipidemia and obesity. Past treatments include beta blockers and angiotensin blockers. Current antihypertension  treatment includes angiotensin blockers and beta blockers. The current treatment provides significant improvement. There are no compliance problems.  Identifiable causes of hypertension include hypercortisolism and a thyroid problem.        Physical Exam  Vitals reviewed.   Constitutional:       Appearance: Normal appearance. She is well-developed. She is morbidly obese.   HENT:      Head: Normocephalic and atraumatic.   Eyes:      Conjunctiva/sclera: Conjunctivae normal.      Pupils: Pupils are equal, round, and reactive to light.   Cardiovascular:      Rate and Rhythm: Normal rate and regular rhythm.      Heart sounds: No murmur heard.  Pulmonary:      Effort: Pulmonary effort is normal.      Breath sounds: Normal breath sounds. No wheezing or rhonchi.   Skin:     General: Skin is warm and dry.   Neurological:      Mental Status: She is alert and oriented to person, place, and time.   Psychiatric:         Mood and Affect: Mood and affect normal.         Behavior: Behavior normal.         Thought Content: Thought content normal.         Judgment: Judgment normal.     Class 3 Severe Obesity (BMI >=40). Obesity-related health conditions include the following: obstructive sleep apnea, hypertension, diabetes mellitus, dyslipidemias, and osteoarthritis. Obesity is improving with treatment. BMI is is above average; BMI management plan is completed. We discussed low calorie, low carb based diet program, portion control, and increasing exercise.   REVIEWED DATA      Labs:    Lab Results   Component Value Date     11/24/2022    K 4.5 11/24/2022    CALCIUM 10.2 11/24/2022    AST 44 (H) 11/24/2022    ALT 45 (H) 11/24/2022    BUN 13 11/24/2022    CREATININE 1.15 (H) 11/24/2022    CREATININE 0.78 10/31/2022    CREATININE 0.74 07/07/2022    EGFRIFNONA >60 04/08/2022    EGFRIFAFRI 95 05/02/2023       Lab Results   Component Value Date     (H) 04/08/2022    HGBA1C 6.3 03/15/2023    HGBA1C 9.90 (H) 10/31/2022    HGBA1C  8.80 (H) 07/07/2022    TSH 2.940 10/31/2022    FREET4 1.1 05/02/2023       Lab Results   Component Value Date    LDL 85 10/31/2022    HDL 59 10/31/2022    TRIG 90 10/31/2022    CHOLHDLRATIO 3.5 08/31/2020       Lab Results   Component Value Date    BMQB68IB 46.1 05/02/2023        Lab Results   Component Value Date    WBC 9.96 05/02/2023    HGB 13.8 05/02/2023    MCV 90 05/02/2023     (H) 05/02/2023       Lab Results   Component Value Date    GLUCOSEU Negative 11/25/2022    BLOODU Moderate (2+) (A) 11/25/2022    NITRITEU Negative 11/25/2022    LEUKOCYTESUR Negative 11/25/2022          Lab Results   Component Value Date    HEPCVIRUSABY Non-Reactive 04/04/2022           ASSESSMENT & PLAN     Diagnoses and all orders for this visit:    1. Annual physical exam (Primary)    2. Secondary hypothyroidism  Comments:  Patient being followed by endocrinology out of [facility], doing well, no concerns    3. Essential hypertension  Comments:  Blood pressure stable at 124/74, will continue on current medication regimen    4. Mixed hyperlipidemia  Comments:  Will check labs, adjust medication if needed  Orders:  -     Lipid Panel    5. Vitamin D deficiency    6. Encounter for screening mammogram for malignant neoplasm of breast  -     Mammo Screening Digital Tomosynthesis Bilateral With CAD; Future    7. Pituitary tumor  Comments:  Will refer over to Dr. Rubio for further work-up and management  Orders:  -     Ambulatory Referral to Neurosurgery    8. Brain lesion  -     Ambulatory Referral to Neurosurgery    9. Chronic diarrhea  Comments:  We will schedule for colonoscopy to rule out any abnormalities within the colon.  Orders:  -     Ambulatory Referral For Screening Colonoscopy    10. Family history of brain cancer  Comments:  Will refer over to a  for further work-up and management  Orders:  -     Ambulatory Referral to Genetic Counseling/Testing    11. Family history of breast cancer  -      Ambulatory Referral to Genetic Counseling/Testing    12. Family history of lung cancer  -     Ambulatory Referral to Genetic Counseling/Testing    13. Family history of liver cancer  -     Ambulatory Referral to Genetic Counseling/Testing    14. Morbid obesity with BMI of 45.0-49.9, adult        HEALTHCARE MAINTENANCE ISSUES     Cancer Screening:  Colon: Initial/Next screening at age: 46  Repeat colon cancer screening: N/A at this time  Breast: Recommended monthly self exams; annual professional exam  Mammogram: every 1 year  Cervical: pelvic exam as recommended by gyne  Skin: Monthly self skin examination, annual exam by health professional      Lifestyle Counseling:  Lifestyle Modifications: Attempt to lose weight, Continue good lifestyle choices/modifications, and Improve dietary compliance  Safety Issues: Always wear seatbelt, Avoid texting while driving   Use sunscreen, regular skin examination  Recommended annual dental/vision examination.  Emotional/Stress/Sleep: Reviewed and  given when appropriate    Part of this note may be electronic transcription/translation of spoken language to printed text using the Dragon dictation system

## 2023-06-14 RX ORDER — BLOOD-GLUCOSE METER
KIT MISCELLANEOUS
Qty: 300 EACH | Refills: 2 | Status: SHIPPED | OUTPATIENT
Start: 2023-06-14

## 2023-08-07 ENCOUNTER — LAB (OUTPATIENT)
Dept: LAB | Facility: HOSPITAL | Age: 43
End: 2023-08-07
Payer: COMMERCIAL

## 2023-08-07 ENCOUNTER — TRANSCRIBE ORDERS (OUTPATIENT)
Dept: ENDOCRINOLOGY | Age: 43
End: 2023-08-07
Payer: COMMERCIAL

## 2023-08-07 DIAGNOSIS — E24.0 PITUITARY DEPENDENT HYPERCORTISOLISM: Primary | ICD-10-CM

## 2023-08-07 DIAGNOSIS — E24.0 PITUITARY DEPENDENT HYPERCORTISOLISM: ICD-10-CM

## 2023-08-07 PROCEDURE — 36415 COLL VENOUS BLD VENIPUNCTURE: CPT

## 2023-08-07 PROCEDURE — 82533 TOTAL CORTISOL: CPT

## 2023-08-08 LAB — CORTIS SERPL-MCNC: 9.6 UG/DL (ref 6.2–19.4)

## 2023-08-14 RX ORDER — METOPROLOL SUCCINATE 50 MG/1
TABLET, EXTENDED RELEASE ORAL
Qty: 30 TABLET | Refills: 5 | Status: SHIPPED | OUTPATIENT
Start: 2023-08-14

## 2023-08-14 RX ORDER — ATORVASTATIN CALCIUM 20 MG/1
TABLET, FILM COATED ORAL
Qty: 30 TABLET | Refills: 3 | Status: SHIPPED | OUTPATIENT
Start: 2023-08-14

## 2023-08-14 RX ORDER — MONTELUKAST SODIUM 10 MG/1
TABLET ORAL
Qty: 30 TABLET | Refills: 5 | Status: SHIPPED | OUTPATIENT
Start: 2023-08-14

## 2023-08-14 RX ORDER — CHOLECALCIFEROL (VITAMIN D3) 125 MCG
CAPSULE ORAL
Qty: 30 TABLET | Refills: 5 | Status: SHIPPED | OUTPATIENT
Start: 2023-08-14

## 2023-08-14 RX ORDER — CETIRIZINE HYDROCHLORIDE 10 MG/1
TABLET ORAL
Qty: 30 TABLET | Refills: 5 | Status: SHIPPED | OUTPATIENT
Start: 2023-08-14

## 2023-08-14 RX ORDER — LOSARTAN POTASSIUM 50 MG/1
50 TABLET ORAL DAILY
Qty: 30 TABLET | Refills: 5 | Status: SHIPPED | OUTPATIENT
Start: 2023-08-14

## 2023-08-14 RX ORDER — LEVOTHYROXINE, LIOTHYRONINE 9.5; 2.25 UG/1; UG/1
TABLET ORAL
Qty: 30 TABLET | Refills: 5 | Status: SHIPPED | OUTPATIENT
Start: 2023-08-14

## 2023-08-14 RX ORDER — LEVOTHYROXINE, LIOTHYRONINE 76; 18 UG/1; UG/1
TABLET ORAL
Qty: 30 TABLET | Refills: 5 | Status: SHIPPED | OUTPATIENT
Start: 2023-08-14

## 2023-11-14 RX ORDER — FOLIC ACID 1 MG/1
1000 TABLET ORAL DAILY
Qty: 30 TABLET | Refills: 3 | Status: SHIPPED | OUTPATIENT
Start: 2023-11-14

## 2023-12-13 RX ORDER — KETOROLAC TROMETHAMINE 10 MG/1
10 TABLET, FILM COATED ORAL EVERY 6 HOURS PRN
Qty: 21 TABLET | Refills: 0 | Status: SHIPPED | OUTPATIENT
Start: 2023-12-13

## 2023-12-21 ENCOUNTER — LAB (OUTPATIENT)
Dept: LAB | Facility: HOSPITAL | Age: 43
End: 2023-12-21
Payer: COMMERCIAL

## 2023-12-21 ENCOUNTER — TRANSCRIBE ORDERS (OUTPATIENT)
Dept: LAB | Facility: HOSPITAL | Age: 43
End: 2023-12-21
Payer: COMMERCIAL

## 2023-12-21 DIAGNOSIS — M05.79 SEROPOSITIVE RHEUMATOID ARTHRITIS OF MULTIPLE SITES: ICD-10-CM

## 2023-12-21 DIAGNOSIS — Z79.899 ENCOUNTER FOR LONG-TERM (CURRENT) USE OF OTHER MEDICATIONS: ICD-10-CM

## 2023-12-21 DIAGNOSIS — Z79.899 ENCOUNTER FOR LONG-TERM (CURRENT) USE OF OTHER MEDICATIONS: Primary | ICD-10-CM

## 2023-12-21 LAB
ALBUMIN SERPL-MCNC: 4.3 G/DL (ref 3.5–5.2)
ALBUMIN/GLOB SERPL: 1.4 G/DL
ALP SERPL-CCNC: 79 U/L (ref 39–117)
ALT SERPL W P-5'-P-CCNC: 23 U/L (ref 1–33)
ANION GAP SERPL CALCULATED.3IONS-SCNC: 8.7 MMOL/L (ref 5–15)
AST SERPL-CCNC: 19 U/L (ref 1–32)
BASOPHILS # BLD AUTO: 0.08 10*3/MM3 (ref 0–0.2)
BASOPHILS NFR BLD AUTO: 1.1 % (ref 0–1.5)
BILIRUB SERPL-MCNC: 0.4 MG/DL (ref 0–1.2)
BUN SERPL-MCNC: 7 MG/DL (ref 6–20)
BUN/CREAT SERPL: 6.9 (ref 7–25)
CALCIUM SPEC-SCNC: 10 MG/DL (ref 8.6–10.5)
CHLORIDE SERPL-SCNC: 108 MMOL/L (ref 98–107)
CO2 SERPL-SCNC: 23.3 MMOL/L (ref 22–29)
CREAT SERPL-MCNC: 1.02 MG/DL (ref 0.57–1)
CRP SERPL-MCNC: 0.51 MG/DL (ref 0–0.5)
DEPRECATED RDW RBC AUTO: 41.9 FL (ref 37–54)
EGFRCR SERPLBLD CKD-EPI 2021: 70.6 ML/MIN/1.73
EOSINOPHIL # BLD AUTO: 0.47 10*3/MM3 (ref 0–0.4)
EOSINOPHIL NFR BLD AUTO: 6.6 % (ref 0.3–6.2)
ERYTHROCYTE [DISTWIDTH] IN BLOOD BY AUTOMATED COUNT: 12.9 % (ref 12.3–15.4)
ERYTHROCYTE [SEDIMENTATION RATE] IN BLOOD: 7 MM/HR (ref 0–20)
GLOBULIN UR ELPH-MCNC: 3.1 GM/DL
GLUCOSE SERPL-MCNC: 93 MG/DL (ref 65–99)
HCT VFR BLD AUTO: 41.2 % (ref 34–46.6)
HGB BLD-MCNC: 14.1 G/DL (ref 12–15.9)
IMM GRANULOCYTES # BLD AUTO: 0.02 10*3/MM3 (ref 0–0.05)
IMM GRANULOCYTES NFR BLD AUTO: 0.3 % (ref 0–0.5)
LYMPHOCYTES # BLD AUTO: 3.7 10*3/MM3 (ref 0.7–3.1)
LYMPHOCYTES NFR BLD AUTO: 51.9 % (ref 19.6–45.3)
MCH RBC QN AUTO: 30.9 PG (ref 26.6–33)
MCHC RBC AUTO-ENTMCNC: 34.2 G/DL (ref 31.5–35.7)
MCV RBC AUTO: 90.4 FL (ref 79–97)
MONOCYTES # BLD AUTO: 0.39 10*3/MM3 (ref 0.1–0.9)
MONOCYTES NFR BLD AUTO: 5.5 % (ref 5–12)
NEUTROPHILS NFR BLD AUTO: 2.47 10*3/MM3 (ref 1.7–7)
NEUTROPHILS NFR BLD AUTO: 34.6 % (ref 42.7–76)
NRBC BLD AUTO-RTO: 0 /100 WBC (ref 0–0.2)
PLATELET # BLD AUTO: 372 10*3/MM3 (ref 140–450)
PMV BLD AUTO: 10.9 FL (ref 6–12)
POTASSIUM SERPL-SCNC: 4.4 MMOL/L (ref 3.5–5.2)
PROT SERPL-MCNC: 7.4 G/DL (ref 6–8.5)
RBC # BLD AUTO: 4.56 10*6/MM3 (ref 3.77–5.28)
SODIUM SERPL-SCNC: 140 MMOL/L (ref 136–145)
WBC NRBC COR # BLD AUTO: 7.13 10*3/MM3 (ref 3.4–10.8)

## 2023-12-21 PROCEDURE — 86140 C-REACTIVE PROTEIN: CPT

## 2023-12-21 PROCEDURE — 80053 COMPREHEN METABOLIC PANEL: CPT

## 2023-12-21 PROCEDURE — 85025 COMPLETE CBC W/AUTO DIFF WBC: CPT

## 2023-12-21 PROCEDURE — 85652 RBC SED RATE AUTOMATED: CPT

## 2023-12-21 PROCEDURE — 36415 COLL VENOUS BLD VENIPUNCTURE: CPT

## 2024-01-08 ENCOUNTER — CLINICAL SUPPORT (OUTPATIENT)
Dept: GENETICS | Facility: HOSPITAL | Age: 44
End: 2024-01-08
Payer: COMMERCIAL

## 2024-01-08 DIAGNOSIS — Z80.51 FAMILY HISTORY OF CARCINOMA OF KIDNEY: ICD-10-CM

## 2024-01-08 DIAGNOSIS — Z13.79 GENETIC TESTING: ICD-10-CM

## 2024-01-08 DIAGNOSIS — Z80.8 FAMILY HISTORY OF GLIOBLASTOMA: ICD-10-CM

## 2024-01-08 DIAGNOSIS — Z80.0 FAMILY HISTORY OF STOMACH CANCER: Primary | ICD-10-CM

## 2024-01-08 DIAGNOSIS — Z80.49 FAMILY HISTORY OF UTERINE CANCER: ICD-10-CM

## 2024-01-10 NOTE — PROGRESS NOTES
Beena Osborne is a 43-year-old female who was referred for genetic counseling due to a family history of cancer.   Genetic counseling was provided via telehealth.  Patient confirmed her name, date of birth, and that she was located in the [facility].  Ms. Osborne does not have a personal history of cancer.  Ms. Osborne has not had a colonoscopy.  She has had screening mammograms, and no breast biopsies. Ms. Osborne was concerned about her family history of cancer and was interested in pursuing genetic testing.  The [facility] Multi-Cancer Panel was ordered which analyzes 70 genes associated with hereditary cancer risk.  Mobile phlebotomy was requested for sample collection. Once the sample has been sent in, results are expected in 2-3 weeks.    PERTINENT FAMILY HISTORY (see attached pedigree):    Brother:  Testicular cancer, 22  Mat. Uncle: Brain cancer    Lung cancer    Stomach cancer, 49    Kidney cancer    Bladder cancer    Bone cancer    Reported to be separate primaries rather than metastatic disease  Mat. 1st cousin: Uterine cancer, 23  Father:  Glioblastoma, 75    Bladder cancer, 54  Pat. Uncle: Skin cancer, 73  Pat. Grandmother: Glioblastoma, 85     Lung cancer, 85  Pat. Great-Uncle: Prostate cancer         We do not have medical records confirming the diagnoses in Ms. Osborne's family.    GENETIC COUNSELING:  We reviewed the family history information in detail. Cases of cancer follow three general patterns: sporadic, familial, and hereditary.  While most cancer is sporadic (75-80%), some cases appear to occur in family clusters.  These cases are said to be familial and account for 10-20% of cancer cases.  Familial cases may be due to a combination of shared genes and environmental factors among family members.  In even fewer cases (5-10%), the risk for cancer is inherited, and the genes responsible for the increased cancer risk are known.      Family histories typical of hereditary cancer  syndromes usually include multiple first- and second-degree relatives diagnosed with cancer types that define a syndrome.  These cases tend to be diagnosed at younger-than-expected ages and can be bilateral or multifocal.  The cancer in these families follows an autosomal dominant inheritance pattern, which indicates the likely presence of a mutation in a cancer susceptibility gene.  Children and siblings of an individual believed to carry this mutation have a 50% chance of inheriting that mutation, thereby inheriting the increased risk to develop cancer.  These mutations can be passed down from the maternal or the paternal lineage.      We discussed the criteria for genetic testing based on family history. The family would warrant consideration for testing for Rader syndrome bsed on the combination of stomach, bladder, and early onset uterine cancer on the maternal side of the family. We discussed that there are many genes that can influence the risk for cancer. The standard approach to genetic testing for hereditary cancer risk is via a multigene panel.  Genes included on these panels have varying degrees of risk associated, and management and screening guidelines vary based on the specific gene.  Hereditary cancer syndromes can demonstrate incomplete penetrance and variable expression within families.  Based on Ms. Osborne's family history, she opted to pursue testing through a panel evaluating multiple genes associated with hereditary cancer risk.    GENETIC TESTING:  The risks, benefits and limitations of genetic testing and implications for clinical management following testing were reviewed. DNA test results can influence decisions regarding screening and prevention.  Genetic testing can have significant psychological implications for both individuals and families. Also discussed was the possibility of employment and insurance discrimination based on genetic test results and the federal and states laws that  are in place to prevent this.         We discussed panel testing, which would involve testing 70 genes associated with hereditary cancer risk. The benefits and limitations of genetic testing were discussed.  The implications of a positive or negative test result were discussed.  We also discussed the importance of testing on an affected relative. We discussed the possibility that, in some cases, genetic test results may be ambiguous due to the identification of a genetic variant. These variants may or may not be associated with an increased cancer risk. With multigene panel testing, it is not uncommon for a variant of uncertain significance (VUS) to be identified.  If a VUS is identified, testing family members is not recommended and screening recommendations are made based on the family history.  The laboratories that perform genetic testing work to reclassify the VUS and send out an amended report if and when a VUS is reclassified.  The majority of variant findings are ultimately reclassified to a negative result. Given her family history, a negative test result does not eliminate all cancer risk, although the risk would not be as high as it would with positive genetic testing.     PLAN:  Genetic testing via the [facility] Multi-Cancer Panel was ordered and mobile phlebotomy service has been requested for sample collection.  Once the sample is sent in, results are expected in 2-3 weeks. We will contact MsPia Osborne with her results once they are received.      Michaelle Suresh MS, [facility] [place], [facility]  Licensed Certified Genetic Counselor

## 2024-01-15 RX ORDER — ATORVASTATIN CALCIUM 20 MG/1
TABLET, FILM COATED ORAL
Qty: 30 TABLET | Refills: 3 | Status: SHIPPED | OUTPATIENT
Start: 2024-01-15

## 2024-02-16 ENCOUNTER — TELEPHONE (OUTPATIENT)
Dept: GENETICS | Facility: HOSPITAL | Age: 44
End: 2024-02-16
Payer: COMMERCIAL

## 2024-02-16 ENCOUNTER — DOCUMENTATION (OUTPATIENT)
Dept: GENETICS | Facility: HOSPITAL | Age: 44
End: 2024-02-16
Payer: COMMERCIAL

## 2024-02-20 ENCOUNTER — APPOINTMENT (OUTPATIENT)
Dept: CT IMAGING | Facility: HOSPITAL | Age: 44
End: 2024-02-20
Payer: COMMERCIAL

## 2024-02-20 ENCOUNTER — HOSPITAL ENCOUNTER (EMERGENCY)
Facility: HOSPITAL | Age: 44
Discharge: HOME | End: 2024-02-20
Attending: EMERGENCY MEDICINE | Admitting: EMERGENCY MEDICINE
Payer: COMMERCIAL

## 2024-02-20 VITALS
TEMPERATURE: 98.7 F | BODY MASS INDEX: 40.95 KG/M2 | HEART RATE: 108 BPM | SYSTOLIC BLOOD PRESSURE: 115 MMHG | RESPIRATION RATE: 20 BRPM | DIASTOLIC BLOOD PRESSURE: 68 MMHG | HEIGHT: 64 IN | OXYGEN SATURATION: 98 % | WEIGHT: 239.86 LBS

## 2024-02-20 DIAGNOSIS — N13.2 URETERAL STONE WITH HYDRONEPHROSIS: Primary | ICD-10-CM

## 2024-02-20 DIAGNOSIS — N39.0 ACUTE UTI: ICD-10-CM

## 2024-02-20 LAB
ALBUMIN SERPL-MCNC: 3.6 G/DL (ref 3.5–5.2)
ALBUMIN/GLOB SERPL: 1.1 G/DL
ALP SERPL-CCNC: 77 U/L (ref 39–117)
ALT SERPL W P-5'-P-CCNC: 13 U/L (ref 1–33)
ANION GAP SERPL CALCULATED.3IONS-SCNC: 9.9 MMOL/L (ref 5–15)
AST SERPL-CCNC: 13 U/L (ref 1–32)
BACTERIA UR QL AUTO: ABNORMAL /HPF
BASOPHILS # BLD AUTO: 0.05 10*3/MM3 (ref 0–0.2)
BASOPHILS NFR BLD AUTO: 0.4 % (ref 0–1.5)
BILIRUB SERPL-MCNC: 0.6 MG/DL (ref 0–1.2)
BILIRUB UR QL STRIP: NEGATIVE
BUN SERPL-MCNC: 18 MG/DL (ref 6–20)
BUN/CREAT SERPL: 9.8 (ref 7–25)
CALCIUM SPEC-SCNC: 9.2 MG/DL (ref 8.6–10.5)
CHLORIDE SERPL-SCNC: 102 MMOL/L (ref 98–107)
CLARITY UR: CLEAR
CO2 SERPL-SCNC: 24.1 MMOL/L (ref 22–29)
COLOR UR: ABNORMAL
CREAT SERPL-MCNC: 1.84 MG/DL (ref 0.57–1)
DEPRECATED RDW RBC AUTO: 45.4 FL (ref 37–54)
EGFRCR SERPLBLD CKD-EPI 2021: 34.8 ML/MIN/1.73
EOSINOPHIL # BLD AUTO: 0.37 10*3/MM3 (ref 0–0.4)
EOSINOPHIL NFR BLD AUTO: 3.2 % (ref 0.3–6.2)
ERYTHROCYTE [DISTWIDTH] IN BLOOD BY AUTOMATED COUNT: 13.4 % (ref 12.3–15.4)
GLOBULIN UR ELPH-MCNC: 3.3 GM/DL
GLUCOSE SERPL-MCNC: 126 MG/DL (ref 65–99)
GLUCOSE UR STRIP-MCNC: NEGATIVE MG/DL
HCG INTACT+B SERPL-ACNC: <0.5 MIU/ML
HCT VFR BLD AUTO: 35.4 % (ref 34–46.6)
HGB BLD-MCNC: 11.8 G/DL (ref 12–15.9)
HGB UR QL STRIP.AUTO: NEGATIVE
HOLD SPECIMEN: NORMAL
HOLD SPECIMEN: NORMAL
HYALINE CASTS UR QL AUTO: ABNORMAL /LPF
IMM GRANULOCYTES # BLD AUTO: 0.07 10*3/MM3 (ref 0–0.05)
IMM GRANULOCYTES NFR BLD AUTO: 0.6 % (ref 0–0.5)
KETONES UR QL STRIP: NEGATIVE
LEUKOCYTE ESTERASE UR QL STRIP.AUTO: ABNORMAL
LIPASE SERPL-CCNC: 15 U/L (ref 13–60)
LYMPHOCYTES # BLD AUTO: 2.52 10*3/MM3 (ref 0.7–3.1)
LYMPHOCYTES NFR BLD AUTO: 21.8 % (ref 19.6–45.3)
MCH RBC QN AUTO: 30.6 PG (ref 26.6–33)
MCHC RBC AUTO-ENTMCNC: 33.3 G/DL (ref 31.5–35.7)
MCV RBC AUTO: 91.9 FL (ref 79–97)
MONOCYTES # BLD AUTO: 0.99 10*3/MM3 (ref 0.1–0.9)
MONOCYTES NFR BLD AUTO: 8.6 % (ref 5–12)
NEUTROPHILS NFR BLD AUTO: 65.4 % (ref 42.7–76)
NEUTROPHILS NFR BLD AUTO: 7.56 10*3/MM3 (ref 1.7–7)
NITRITE UR QL STRIP: POSITIVE
NRBC BLD AUTO-RTO: 0 /100 WBC (ref 0–0.2)
PH UR STRIP.AUTO: 7 [PH] (ref 5–8)
PLATELET # BLD AUTO: 323 10*3/MM3 (ref 140–450)
PMV BLD AUTO: 10.2 FL (ref 6–12)
POTASSIUM SERPL-SCNC: 3.7 MMOL/L (ref 3.5–5.2)
PROT SERPL-MCNC: 6.9 G/DL (ref 6–8.5)
PROT UR QL STRIP: NEGATIVE
RBC # BLD AUTO: 3.85 10*6/MM3 (ref 3.77–5.28)
RBC # UR STRIP: ABNORMAL /HPF
REF LAB TEST METHOD: ABNORMAL
SODIUM SERPL-SCNC: 136 MMOL/L (ref 136–145)
SP GR UR STRIP: 1.01 (ref 1–1.03)
SQUAMOUS #/AREA URNS HPF: ABNORMAL /HPF
UROBILINOGEN UR QL STRIP: ABNORMAL
WBC # UR STRIP: ABNORMAL /HPF
WBC NRBC COR # BLD AUTO: 11.56 10*3/MM3 (ref 3.4–10.8)
WHOLE BLOOD HOLD COAG: NORMAL
WHOLE BLOOD HOLD SPECIMEN: NORMAL

## 2024-02-20 PROCEDURE — 81001 URINALYSIS AUTO W/SCOPE: CPT

## 2024-02-20 PROCEDURE — 25010000002 CEFTRIAXONE PER 250 MG

## 2024-02-20 PROCEDURE — 25010000002 MORPHINE PER 10 MG: Performed by: EMERGENCY MEDICINE

## 2024-02-20 PROCEDURE — 80053 COMPREHEN METABOLIC PANEL: CPT

## 2024-02-20 PROCEDURE — 96365 THER/PROPH/DIAG IV INF INIT: CPT

## 2024-02-20 PROCEDURE — 87086 URINE CULTURE/COLONY COUNT: CPT

## 2024-02-20 PROCEDURE — 25510000001 IOPAMIDOL PER 1 ML: Performed by: EMERGENCY MEDICINE

## 2024-02-20 PROCEDURE — 84702 CHORIONIC GONADOTROPIN TEST: CPT

## 2024-02-20 PROCEDURE — 96375 TX/PRO/DX INJ NEW DRUG ADDON: CPT

## 2024-02-20 PROCEDURE — 74177 CT ABD & PELVIS W/CONTRAST: CPT

## 2024-02-20 PROCEDURE — 99285 EMERGENCY DEPT VISIT HI MDM: CPT

## 2024-02-20 PROCEDURE — 25010000002 METOCLOPRAMIDE PER 10 MG

## 2024-02-20 PROCEDURE — 25810000003 SODIUM CHLORIDE 0.9 % SOLUTION

## 2024-02-20 PROCEDURE — 83690 ASSAY OF LIPASE: CPT

## 2024-02-20 PROCEDURE — 25010000002 KETOROLAC TROMETHAMINE PER 15 MG

## 2024-02-20 PROCEDURE — 85025 COMPLETE CBC W/AUTO DIFF WBC: CPT

## 2024-02-20 PROCEDURE — 36415 COLL VENOUS BLD VENIPUNCTURE: CPT

## 2024-02-20 RX ORDER — CEPHALEXIN 500 MG/1
500 CAPSULE ORAL 2 TIMES DAILY
Qty: 14 CAPSULE | Refills: 0 | Status: SHIPPED | OUTPATIENT
Start: 2024-02-20 | End: 2024-02-27

## 2024-02-20 RX ORDER — SODIUM CHLORIDE 0.9 % (FLUSH) 0.9 %
10 SYRINGE (ML) INJECTION AS NEEDED
Status: DISCONTINUED | OUTPATIENT
Start: 2024-02-20 | End: 2024-02-21 | Stop reason: HOSPADM

## 2024-02-20 RX ORDER — KETOROLAC TROMETHAMINE 15 MG/ML
15 INJECTION, SOLUTION INTRAMUSCULAR; INTRAVENOUS ONCE
Status: COMPLETED | OUTPATIENT
Start: 2024-02-20 | End: 2024-02-20

## 2024-02-20 RX ORDER — METOCLOPRAMIDE HYDROCHLORIDE 5 MG/ML
5 INJECTION INTRAMUSCULAR; INTRAVENOUS ONCE
Status: COMPLETED | OUTPATIENT
Start: 2024-02-20 | End: 2024-02-20

## 2024-02-20 RX ADMIN — CEFTRIAXONE SODIUM 1000 MG: 1 INJECTION, POWDER, FOR SOLUTION INTRAMUSCULAR; INTRAVENOUS at 21:48

## 2024-02-20 RX ADMIN — SODIUM CHLORIDE 1000 ML: 9 INJECTION, SOLUTION INTRAVENOUS at 21:47

## 2024-02-20 RX ADMIN — METOCLOPRAMIDE HYDROCHLORIDE 5 MG: 5 INJECTION INTRAMUSCULAR; INTRAVENOUS at 21:48

## 2024-02-20 RX ADMIN — MORPHINE SULFATE 4 MG: 4 INJECTION, SOLUTION INTRAMUSCULAR; INTRAVENOUS at 22:00

## 2024-02-20 RX ADMIN — IOPAMIDOL 100 ML: 755 INJECTION, SOLUTION INTRAVENOUS at 20:54

## 2024-02-20 RX ADMIN — KETOROLAC TROMETHAMINE 15 MG: 15 INJECTION, SOLUTION INTRAMUSCULAR; INTRAVENOUS at 21:48

## 2024-02-21 NOTE — ED PROVIDER NOTES
"Time: 10:13 PM EST  Date of encounter:  2/20/2024  Independent Historian/Clinical History and Information was obtained by:   Patient    History is limited by: N/A    Chief Complaint: Right flank pain      History of Present Illness:  Patient is a 43 y.o. year old female who presents to the emergency department for evaluation of right flank pain that began 3 days ago.  Patient states she was seen at [facility] in [place] and diagnosed with a kidney stone and sent home on pain meds and Flomax.  Patient states she is still having pain and notes dysuria.  Patient denies fever.  Patient admits to nausea but denies vomiting.    HPI    Patient Care Team  Primary Care Provider: Beena Green MD    Past Medical History:     Allergies   Allergen Reactions    Meperidine Hallucinations    Tetanus Immune Globulin GI Intolerance    Amlodipine Diarrhea    Ondansetron Hcl Other (See Comments)     Fever      Tetanus Toxoid Other (See Comments)    Zofran [Ondansetron] GI Intolerance    Barley Grass Other (See Comments)    Metformin Other (See Comments)     Did not work      Molds & Smuts Other (See Comments)    Ozempic (0.25 Or 0.5 Mg-Dose) [Semaglutide(0.25 Or 0.5mg-Dos)] Other (See Comments)     \"brain fog\"     Victoza [Liraglutide] GI Intolerance     Past Medical History:   Diagnosis Date    ADHD (attention deficit hyperactivity disorder)     Allergic     Allergic rhinitis 07/10/2015    WILL ADD FLONASE 2 CETIRIZINE    Anemia     Anxiety     Asthma     B12 deficiency     Depression     Difficulty concentrating 07/15/2016    WILL TRY STRATERA    Graves disease 07/10/2015    HLD (hyperlipidemia) 07/10/2015    WILL CHECK LABS AND ADJUST MEDS ACCORDINGLY    Hyperlipidemia LDL goal <100 07/10/2015    Hypertension     Hyperthyroidism     Inappropriate sinus tachycardia 10/07/2021    05/03/21 Holter:  1.  Normal average heart rate of 98 beats per minute. 2.  There was one PVC recorded.     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    Visual impairment     Vitamin D deficiency      Past Surgical History:   Procedure Laterality Date    APPENDECTOMY  1984    CYST REMOVAL      NECK    INCISION AND DRAINAGE ABSCESS      NECK    OTHER SURGICAL HISTORY      thyroid ablation    OVARIAN CYST REMOVAL  1999    SEPTOPLASTY  2012    SINUS SURGERY  2012     Family History   Problem Relation Age of Onset    COPD Father     Cancer Father     Osteoporosis Father     Arrhythmia Father     Hypothyroidism Father     Arthritis Father     Thyroid disease Father     Vision loss Father     Mental illness Maternal Grandmother     Thyroid disease Maternal Grandmother     Stomach cancer Maternal Grandfather     Alcohol abuse Maternal Grandfather     Mental illness Maternal Grandfather     Other Mother         cognitave impairment    Osteopenia Mother     Hypothyroidism Mother     Mental illness Mother     Arthritis Mother     Hyperlipidemia Mother     Learning disabilities Mother     Thyroid disease Mother     Testicular cancer Brother     Lung cancer Paternal Grandmother     Arthritis Paternal Grandmother     Alcohol abuse Maternal Uncle     Alcohol abuse Paternal Uncle     Alcohol abuse Sister     Thyroid disease Sister     Mental illness Paternal Uncle     Mental illness Sister     Learning disabilities Sister     Thyroid disease Sister     Mental illness Sister     Mental illness Brother     Arthritis Brother     Thyroid disease Maternal Aunt        Home Medications:  Prior to Admission medications    Medication Sig Start Date End Date Taking? Authorizing Provider   NP  Thyroid 15 MG tablet TAKE 1 TABLET BY MOUTH DAILY. 8/14/23   Renee Hogue APRN   Adalimumab (Humira Pen) 40 MG/0.4ML Pen-injector Kit Inject 40 mg under the skin into the appropriate area as directed Every 14 (Fourteen) Days. 10/17/22   Renee Hogue APRN   atorvastatin (LIPITOR) 20 MG tablet TAKE ONE TABLET BY MOUTH EVERY NIGHT AT BEDTIME 1/15/24   Renee Hogue APRN   azelastine (ASTELIN) 0.1 % nasal spray  4/20/21   Provider, MD Edilberto   cetirizine (zyrTEC) 10 MG tablet TAKE ONE TABLET BY MOUTH ONCE DAILY 8/14/23   Renee Hogue APRN   Cholecalciferol (Vitamin D3) 50 MCG (2000 UT) capsule Take 1 capsule by mouth Daily. 10/17/22   Renee Hogue APRN   Cholecalciferol (Vitamin D3) 50 MCG (2000 UT) tablet TAKE ONE TABLET BY MOUTH ONCE DAILY 8/14/23   Renee Hogue APRN   folic acid (FOLVITE) 1 MG tablet TAKE ONE TABLET BY MOUTH ONCE DAILY 11/14/23   Renee Hogue APRN   glucose blood (FREESTYLE LITE) test strip USE AS DIRECTED TO CHECK BLOOD GLUCOSE ONCE BEFORE BREAKFAST AND AFTER LARGEST MEAL OF THE DAY 6/14/23   Renee Hogue APRN   glucose monitor monitoring kit 1 each 2 (Two) Times a Day. E11.9 BID fasting and 2hr after largest meal 11/2/22 7/28/25  Renee Hogue APRN   ketorolac (TORADOL) 10 MG tablet Take 1 tablet by mouth Every 6 (Six) Hours As Needed for Moderate Pain or Severe Pain. 12/13/23   Renee Hogue APRN   Lancets (freestyle) lancets USE TO TEST BLOOD SUGAR TWO TIMES A DAY 4/3/23   Renee Hogue APRN   Liraglutide (VICTOZA) 18 MG/3ML solution pen-injector injection Inject 0.6 mg under the skin into the appropriate area as directed Daily for 90 days. 8/29/22 11/27/22  Renee Hogue APRN   losartan (COZAAR) 50 MG tablet TAKE 1 TABLET BY MOUTH DAILY. 8/14/23   Renee Hogue APRN   metoprolol succinate XL (TOPROL-XL) 50 MG 24 hr tablet TAKE ONE TABLET BY MOUTH ONCE DAILY 8/14/23    "Renee Hogue APRN   montelukast (SINGULAIR) 10 MG tablet TAKE ONE TABLET BY MOUTH EVERY NIGHT AT BEDTIME 8/14/23   Renee Hogue APRN   NP Thyroid 120 MG tablet TAKE ONE TABLET BY MOUTH ONCE DAILY 8/14/23   Renee Hogue APRN   predniSONE (DELTASONE) 5 MG tablet As Needed. 8/9/21   Edilberto Damon MD   Qnasl 80 MCG/ACT aerosol solution  5/2/23   Edilberto Damon MD   SUMAtriptan (IMITREX) 100 MG tablet  1/31/23   Edilberto Damon MD   topiramate (TOPAMAX) 50 MG tablet Take 1 tablet by mouth. Pt reports taking 2.5 tabs (125mg)    Edilberto Damon MD   Ventolin  (90 Base) MCG/ACT inhaler INHALE 2 PUFFS EVERY 4 (FOUR) HOURS AS NEEDED FOR WHEEZING. 12/21/22   Renee Hogue APRN        Social History:   Social History     Tobacco Use    Smoking status: Never    Smokeless tobacco: Never   Vaping Use    Vaping Use: Never used   Substance Use Topics    Alcohol use: Not Currently     Comment: rarely    Drug use: Never         Review of Systems:  Review of Systems   Constitutional:  Negative for chills and fever.   HENT:  Negative for congestion, rhinorrhea and sore throat.    Eyes:  Negative for pain and visual disturbance.   Respiratory:  Negative for apnea, cough, chest tightness and shortness of breath.    Cardiovascular:  Negative for chest pain and palpitations.   Gastrointestinal:  Positive for nausea. Negative for abdominal pain, diarrhea and vomiting.   Genitourinary:  Positive for dysuria and flank pain. Negative for difficulty urinating.   Musculoskeletal:  Negative for joint swelling and myalgias.   Skin:  Negative for color change.   Neurological:  Negative for seizures and headaches.   Psychiatric/Behavioral: Negative.     All other systems reviewed and are negative.       Physical Exam:  /68 (BP Location: Right arm, Patient Position: Sitting)   Pulse 108   Temp 98.7 °F (37.1 °C) (Oral)   Resp 20   Ht 162.6 cm (64\")   Wt 109 kg (239 lb " 13.8 oz)   LMP  (LMP Unknown)   SpO2 98%   BMI 41.17 kg/m²     Physical Exam  Vitals and nursing note reviewed.   Constitutional:       General: She is not in acute distress.     Appearance: Normal appearance. She is not toxic-appearing.   HENT:      Head: Normocephalic and atraumatic.      Jaw: There is normal jaw occlusion.   Eyes:      General: Lids are normal.      Extraocular Movements: Extraocular movements intact.      Conjunctiva/sclera: Conjunctivae normal.      Pupils: Pupils are equal, round, and reactive to light.   Cardiovascular:      Rate and Rhythm: Normal rate and regular rhythm.      Pulses: Normal pulses.      Heart sounds: Normal heart sounds.   Pulmonary:      Effort: Pulmonary effort is normal. No respiratory distress.      Breath sounds: Normal breath sounds. No wheezing or rhonchi.   Abdominal:      General: Abdomen is flat.      Palpations: Abdomen is soft.      Tenderness: There is no abdominal tenderness. There is right CVA tenderness. There is no guarding or rebound.   Musculoskeletal:         General: Normal range of motion.      Cervical back: Normal range of motion and neck supple.      Right lower leg: No edema.      Left lower leg: No edema.   Skin:     General: Skin is warm and dry.   Neurological:      Mental Status: She is alert and oriented to person, place, and time. Mental status is at baseline.   Psychiatric:         Mood and Affect: Mood normal.                  Procedures:  Procedures      Medical Decision Making:      Comorbidities that affect care:    Asthma, Graves' disease, hypertension    External Notes reviewed:          The following orders were placed and all results were independently analyzed by me:  Orders Placed This Encounter   Procedures    Urine Culture - Urine,    CT Abdomen Pelvis With Contrast    [facility] Draw    Comprehensive Metabolic Panel    Lipase    Urinalysis With Microscopic If Indicated (No Culture) - Urine, Clean Catch    hCG, Quantitative,  Pregnancy    CBC Auto Differential    Urinalysis, Microscopic Only - Urine, Clean Catch    Ambulatory Referral to Urology    NPO Diet NPO Type: Strict NPO    Undress & Gown    Send home  with urine strainer  Nursing Communication    Inpatient Urology Consult    Insert Peripheral IV    CBC & Differential    Green Top (Gel)    Lavender Top    Gold Top - SST    Light Blue Top       Medications Given in the Emergency Department:  Medications   sodium chloride 0.9 % flush 10 mL (has no administration in time range)   cefTRIAXone (ROCEPHIN) 1000 mg/100 mL 0.9% NS (MBP) (1,000 mg Intravenous New Bag 2/20/24 2148)   iopamidol (ISOVUE-370) 76 % injection 100 mL (100 mL Intravenous Given 2/20/24 2054)   ketorolac (TORADOL) injection 15 mg (15 mg Intravenous Given 2/20/24 2148)   metoclopramide (REGLAN) injection 5 mg (5 mg Intravenous Given 2/20/24 2148)   sodium chloride 0.9 % bolus 1,000 mL (1,000 mL Intravenous New Bag 2/20/24 2147)   morphine injection 4 mg (4 mg Intravenous Given 2/20/24 2200)        ED Course:         Labs:    Lab Results (last 24 hours)       Procedure Component Value Units Date/Time    CBC & Differential [[id number]]  (Abnormal) Collected: 02/20/24 1715    Specimen: Blood from Arm, Right Updated: 02/20/24 1724    Narrative:      The following orders were created for panel order CBC & Differential.  Procedure                               Abnormality         Status                     ---------                               -----------         ------                     CBC Auto Differential[[id number]]        Abnormal            Final result                 Please view results for these tests on the individual orders.    Comprehensive Metabolic Panel [[id number]]  (Abnormal) Collected: 02/20/24 1715    Specimen: Blood from Arm, Right Updated: 02/20/24 1746     Glucose 126 mg/dL      BUN 18 mg/dL      Creatinine 1.84 mg/dL      Sodium 136 mmol/L      Potassium 3.7 mmol/L      Chloride 102 mmol/L      CO2  24.1 mmol/L      Calcium 9.2 mg/dL      Total Protein 6.9 g/dL      Albumin 3.6 g/dL      ALT (SGPT) 13 U/L      AST (SGOT) 13 U/L      Alkaline Phosphatase 77 U/L      Total Bilirubin 0.6 mg/dL      Globulin 3.3 gm/dL      A/G Ratio 1.1 g/dL      BUN/Creatinine Ratio 9.8     Anion Gap 9.9 mmol/L      eGFR 34.8 mL/min/1.73     Narrative:      GFR Normal >60  Chronic Kidney Disease <60  Kidney Failure <15      Lipase [[id number]]  (Normal) Collected: 02/20/24 1715    Specimen: Blood from Arm, Right Updated: 02/20/24 1746     Lipase 15 U/L     hCG, Quantitative, Pregnancy [[id number]] Collected: 02/20/24 1715    Specimen: Blood from Arm, Right Updated: 02/20/24 1748     HCG Quantitative <0.50 mIU/mL     Narrative:      HCG Ranges by Gestational Age    Females - non-pregnant premenopausal   </= 1mIU/mL HCG  Females - postmenopausal               </= 7mIU/mL HCG    3 Weeks       5.4   -      72 mIU/mL  4 Weeks      10.2   -     708 mIU/mL  5 Weeks       217   -   8,245 mIU/mL  6 Weeks       152   -  32,177 mIU/mL  7 Weeks     4,059   - 153,767 mIU/mL  8 Weeks    31,366   - 149,094 mIU/mL  9 Weeks    59,109   - 135,901 mIU/mL  10 Weeks   44,186   - 170,409 mIU/mL  12 Weeks   27,107   - 201,615 mIU/mL  14 Weeks   24,302   -  93,646 mIU/mL  15 Weeks   12,540   -  69,747 mIU/mL  16 Weeks    8,904   -  55,332 mIU/mL  17 Weeks    8,240   -  51,793 mIU/mL  18 Weeks    9,649   -  55,271 mIU/mL      CBC Auto Differential [[id number]]  (Abnormal) Collected: 02/20/24 1715    Specimen: Blood from Arm, Right Updated: 02/20/24 1724     WBC 11.56 10*3/mm3      RBC 3.85 10*6/mm3      Hemoglobin 11.8 g/dL      Hematocrit 35.4 %      MCV 91.9 fL      MCH 30.6 pg      MCHC 33.3 g/dL      RDW 13.4 %      RDW-SD 45.4 fl      MPV 10.2 fL      Platelets 323 10*3/mm3      Neutrophil % 65.4 %      Lymphocyte % 21.8 %      Monocyte % 8.6 %      Eosinophil % 3.2 %      Basophil % 0.4 %      Immature Grans % 0.6 %      Neutrophils, Absolute 7.56  10*3/mm3      Lymphocytes, Absolute 2.52 10*3/mm3      Monocytes, Absolute 0.99 10*3/mm3      Eosinophils, Absolute 0.37 10*3/mm3      Basophils, Absolute 0.05 10*3/mm3      Immature Grans, Absolute 0.07 10*3/mm3      nRBC 0.0 /100 WBC     Urinalysis With Microscopic If Indicated (No Culture) - Urine, Clean Catch [[id number]]  (Abnormal) Collected: 02/20/24 1811    Specimen: Urine, Clean Catch Updated: 02/20/24 1854     Color, UA Dark Yellow     Appearance, UA Clear     pH, UA 7.0     Specific Gravity, UA 1.006     Glucose, UA Negative     Ketones, UA Negative     Bilirubin, UA Negative     Blood, UA Negative     Protein, UA Negative     Leuk Esterase, UA Trace     Nitrite, UA Positive     Urobilinogen, UA 1.0 E.U./dL    Urinalysis, Microscopic Only - Urine, Clean Catch [[id number]]  (Abnormal) Collected: 02/20/24 1811    Specimen: Urine, Clean Catch Updated: 02/20/24 1854     RBC, UA None Seen /HPF      WBC, UA 6-10 /HPF      Bacteria, UA 1+ /HPF      Squamous Epithelial Cells, UA 0-2 /HPF      Hyaline Casts, UA None Seen /LPF      Methodology Manual Light Microscopy    Urine Culture - Urine, Urine, Clean Catch [[id number]] Collected: 02/20/24 1811    Specimen: Urine, Clean Catch Updated: 02/20/24 2137             Imaging:    CT Abdomen Pelvis With Contrast    Result Date: 2/20/2024  PROCEDURE: CT ABDOMEN PELVIS W CONTRAST  COMPARISON: [facility], CT, CT ABDOMEN PELVIS WO CONTRAST, 11/25/2022, 0:44.  INDICATIONS: R flank pain  TECHNIQUE: After obtaining the patient's consent, CT images were created with non-ionic intravenous contrast material.   PROTOCOL:   Standard imaging protocol performed    RADIATION:   DLP: 1082.7 mGy*cm   Automated exposure control was utilized to minimize radiation dose. CONTRAST: 100 cc Isovue 370 I.V.  FINDINGS:  The visualized lung bases are clear.  Liver, pancreas, and spleen are within normal limits.  Gallbladder is normal.  Bilateral adrenal glands appear normal.  There are  multiple bilateral nonobstructing renal stones.  There is mild right-sided hydronephrosis secondary to an obstructing 4 mm stone within the distal right ureter approximately 1 cm above the ureterovesical junction.  There is no left-sided hydronephrosis.  No left-sided renal stone.  No abdominal retroperitoneal adenopathy.  The upper GI tract is within normal limits.  Pelvis:  Urinary bladder is within normal limits.  Uterus is normal.  Right ovary is normal.  Within the left ovary there is a fat and soft tissue density mass consistent with an ovarian dermoid.  This marriage ir is a 4.7 cm in size.  The GI tract is unremarkable.  The appendix is not visualized.  There is a small amount of free fluid in the pelvis no pelvic or inguinal adenopathy.  No free intraperitoneal air.  No lytic or sclerotic bony lesion identified.        1. Mild right-sided hydronephrosis secondary to an obstructing 4 mm stone within the distal right ureter approximately 1 cm above the ureterovesical junction. 2. Multiple bilateral non-obstructing renal stones 3. No change in 4.7 cm left ovarian dermoid 4. Small amount of free fluid in the pelvis, similar prior study.     NATA HERNANDEZ MD       Electronically Signed and Approved By: NATA HERNANDEZ MD on 2/20/2024 at 21:23                Differential Diagnosis and Discussion:    Dysuria: Differential diagnosis includes but is not limited to urethritis, cystitis, pyelonephritis, ureteral calculi, neoplasm, chemical irritant, urethral stricture, and trauma  Flank Pain: Differential diagnosis includes but is not limited to kidney stones, pyelonephritis, musculoskeletal disorders, renal infarction, urinary tract infection, hydronephrosis, radiculopathy, aortic aneurysm, renal cell carcinoma.    All labs were reviewed and interpreted by me.  CT scan radiology impression was interpreted by me.    MDM     Amount and/or Complexity of Data Reviewed  Clinical lab tests: reviewed  Tests in the radiology  section of CPT®: reviewed  Decide to obtain previous medical records or to obtain history from someone other than the patient: yes    Patient had nitrite positive urine.  I gave the patient a gram Rocephin in the ED and send off urine culture.  I will start the patient on Keflex.  I spoke with urologist Dr. Barakat who reviewed the patient's CT, labs, and urine and he stated if the patient's pain was under control he would see her in the office in the morning.  The patient states she still has hydrocodone's and Flomax and I instructed her to continue taking the hydrocodone as needed and Flomax daily as prescribed.  I will send the patient home with a urine strainer.  Patient states her pain is under control at this time.  I instructed patient to follow-up with Dr. Barakat in the morning as scheduled.  Instructed patient she can return to the ED in the meantime she develops any worsening symptoms.  Patient states she understands and agrees with plan of care.            Patient Care Considerations:          Consultants/Shared Management Plan:     I spoke with urologist Dr. Barakat who reviewed the patient's CT, labs, and urine and he stated if the patient's pain was under control he would see her in the office in the morning.     Social Determinants of Health:          Disposition and Care Coordination:    Discharged: The patient is suitable and stable for discharge with no need for consideration of admission.    I have explained the patient´s condition, diagnoses and treatment plan based on the information available to me at this time. I have answered questions and addressed any concerns. The patient has a good  understanding of the patient´s diagnosis, condition, and treatment plan as can be expected at this point. The vital signs have been stable. The patient´s condition is stable and appropriate for discharge from the emergency department.      The patient will pursue further outpatient evaluation with the  primary care physician or other designated or consulting physician as outlined in the discharge instructions. They are agreeable to this plan of care and follow-up instructions have been explained in detail. The patient has received these instructions in written format and has expressed an understanding of the discharge instructions. The patient is aware that any significant change in condition or worsening of symptoms should prompt an immediate return to this or the closest emergency department or call to 911.  I have explained discharge medications and the need for follow up with the patient/caretakers. This was also printed in the discharge instructions. Patient was discharged with the following medications and follow up:      Medication List        New Prescriptions      cephalexin 500 MG capsule  Commonly known as: KEFLEX  Take 1 capsule by mouth 2 (Two) Times a Day for 7 days.               Where to Get Your Medications        These medications were sent to [facility], [facility] [place], [place] - [address] [facility] - [phone] [facility] [phone]   [address] [facility] [address]      Phone: [phone]   cephalexin 500 MG capsule      Onesimo Barakat MD  [address]   [place] [address] [phone]    Go in 1 day  As scheduled for further evaluation.       Final diagnoses:   Ureteral stone with hydronephrosis   Acute UTI        ED Disposition       ED Disposition   Discharge    Condition   Stable    Comment   --               This medical record created using voice recognition software.             Chetan Zafar PA-C  02/20/24 0336

## 2024-02-21 NOTE — ED NOTES
Mother came to desk saying her daughter has been here 2 hour and has gotten worse. I reassessed pt, c\o right flank pain that has gotten worse, vitals signs charted, awaiting for ED bed

## 2024-02-22 LAB — BACTERIA SPEC AEROBE CULT: NO GROWTH

## 2024-02-28 NOTE — PROGRESS NOTES
Beena Osborne is a 43-year-old female who was referred for genetic counseling due to a family history of cancer.   Genetic counseling was provided via telehealth.  Patient confirmed her name, date of birth, and that she was located in the [facility].  Ms. Osborne does not have a personal history of cancer.  Ms. Osborne has not had a colonoscopy.  She has had screening mammograms, and no breast biopsies. Ms. sOborne was concerned about her family history of cancer and was interested in pursuing genetic testing.  The [employer] Multi-Cancer Panel was ordered which analyzes 70 genes associated with hereditary cancer risk.  Mobile phlebotomy was requested for sample collection. Genetic testing was negative by sequencing and rearrangement testing for deleterious mutations in 70 genes evaluated on the [employer] Multi-Cancer panel (see attached results). These normal results were discussed with Ms. Osborne by telephone on 2/16/24.     PERTINENT FAMILY HISTORY (see attached pedigree):    Brother: Testicular cancer, 22  Mat. Uncle: Brain cancer    Lung cancer    Stomach cancer, 49    Kidney cancer    Bladder cancer    Bone cancer    Reported to be separate primaries rather than metastatic disease  Mat. 1st cousin: Uterine cancer, 23  Father:  Glioblastoma, 75    Bladder cancer, 54  Pat. Uncle: Skin cancer, 73  Pat. Grandmother: Glioblastoma, 85     Lung cancer, 85  Pat. Great-Uncle: Prostate cancer         We do not have medical records confirming the diagnoses in Ms. Osborne's family.    GENETIC COUNSELING:  We reviewed the family history information in detail. Cases of cancer follow three general patterns: sporadic, familial, and hereditary.  While most cancer is sporadic (75-80%), some cases appear to occur in family clusters.  These cases are said to be familial and account for 10-20% of cancer cases.  Familial cases may be due to a combination of shared genes and environmental factors among family  members.  In even fewer cases (5-10%), the risk for cancer is inherited, and the genes responsible for the increased cancer risk are known.      Family histories typical of hereditary cancer syndromes usually include multiple first- and second-degree relatives diagnosed with cancer types that define a syndrome.  These cases tend to be diagnosed at younger-than-expected ages and can be bilateral or multifocal.  The cancer in these families follows an autosomal dominant inheritance pattern, which indicates the likely presence of a mutation in a cancer susceptibility gene.  Children and siblings of an individual believed to carry this mutation have a 50% chance of inheriting that mutation, thereby inheriting the increased risk to develop cancer.  These mutations can be passed down from the maternal or the paternal lineage.      We discussed the criteria for genetic testing based on family history. The family would warrant consideration for testing for Rader syndrome bsed on the combination of stomach, bladder, and early onset uterine cancer on the maternal side of the family. We discussed that there are many genes that can influence the risk for cancer. The standard approach to genetic testing for hereditary cancer risk is via a multigene panel.  Genes included on these panels have varying degrees of risk associated, and management and screening guidelines vary based on the specific gene.  Hereditary cancer syndromes can demonstrate incomplete penetrance and variable expression within families.  Based on Ms. Osborne's family history, she opted to pursue testing through a panel evaluating multiple genes associated with hereditary cancer risk.    GENETIC TESTING:  The risks, benefits and limitations of genetic testing and implications for clinical management following testing were reviewed. DNA test results can influence decisions regarding screening and prevention.  Genetic testing can have significant psychological  implications for both individuals and families. Also discussed was the possibility of employment and insurance discrimination based on genetic test results and the federal and states laws that are in place to prevent this.         We discussed panel testing, which would involve testing 70 genes associated with hereditary cancer risk. The benefits and limitations of genetic testing were discussed.  The implications of a positive or negative test result were discussed.  We also discussed the importance of testing on an affected relative. We discussed the possibility that, in some cases, genetic test results may be ambiguous due to the identification of a genetic variant. These variants may or may not be associated with an increased cancer risk. With multigene panel testing, it is not uncommon for a variant of uncertain significance (VUS) to be identified.  If a VUS is identified, testing family members is not recommended and screening recommendations are made based on the family history.  The laboratories that perform genetic testing work to reclassify the VUS and send out an amended report if and when a VUS is reclassified.  The majority of variant findings are ultimately reclassified to a negative result. Given her family history, a negative test result does not eliminate all cancer risk, although the risk would not be as high as it would with positive genetic testing.     TEST RESULTS: Genetic testing was negative for known pathogenic mutations by sequencing and rearrangement testing of the 70 genes on the [employer] Multi-Cancer panel.  This negative result greatly lowers, but does not eliminate, the risk of a hereditary cancer syndrome for Ms. Osborne.  There could also be a hereditary cancer syndrome present within the family that Ms. Osborne did not inherit, therefore other relatives may want to consider genetic testing.     A breast cancer risk assessment was also performed utilizing the Tyrer-Cuzick model.   Ms. Osborne's lifetime breast cancer risk was not estimated to exceed that of the general population risk.  Per NCCN guidelines, general population breast cancer screening includes annual clinical breast exam, and annual mammogram starting at age 40.  Ms. Osborne should continue to follow her provider's recommendations for cancer screening. This assessment is based on the information provided at the time of the consultation.    PLAN:  Genetic counseling remains available to Ms. Osborne. If Ms. Osborne has any questions or concerns, she is welcome to contact us at [phone].       Michaelle Suresh MS, [facility] [place], [facility]  Licensed Certified Genetic Counselor    Cc: MIC Harden

## 2024-04-01 ENCOUNTER — HOSPITAL ENCOUNTER (EMERGENCY)
Facility: HOSPITAL | Age: 44
Discharge: HOME | End: 2024-04-01
Attending: EMERGENCY MEDICINE | Admitting: EMERGENCY MEDICINE
Payer: COMMERCIAL

## 2024-04-01 ENCOUNTER — APPOINTMENT (OUTPATIENT)
Dept: CT IMAGING | Facility: HOSPITAL | Age: 44
End: 2024-04-01
Payer: COMMERCIAL

## 2024-04-01 VITALS
DIASTOLIC BLOOD PRESSURE: 62 MMHG | RESPIRATION RATE: 18 BRPM | TEMPERATURE: 98 F | OXYGEN SATURATION: 100 % | BODY MASS INDEX: 42.07 KG/M2 | SYSTOLIC BLOOD PRESSURE: 118 MMHG | HEART RATE: 88 BPM | WEIGHT: 246.4 LBS | HEIGHT: 64 IN

## 2024-04-01 DIAGNOSIS — N39.0 ACUTE UTI: Primary | ICD-10-CM

## 2024-04-01 DIAGNOSIS — N20.1 URETERAL STONE: ICD-10-CM

## 2024-04-01 DIAGNOSIS — D36.9 DERMOID CYST: ICD-10-CM

## 2024-04-01 LAB
ALBUMIN SERPL-MCNC: 4.1 G/DL (ref 3.5–5.2)
ALBUMIN/GLOB SERPL: 1.3 G/DL
ALP SERPL-CCNC: 70 U/L (ref 39–117)
ALT SERPL W P-5'-P-CCNC: 16 U/L (ref 1–33)
ANION GAP SERPL CALCULATED.3IONS-SCNC: 10.9 MMOL/L (ref 5–15)
AST SERPL-CCNC: 17 U/L (ref 1–32)
BACTERIA UR QL AUTO: ABNORMAL /HPF
BASOPHILS # BLD AUTO: 0.1 10*3/MM3 (ref 0–0.2)
BASOPHILS NFR BLD AUTO: 1 % (ref 0–1.5)
BILIRUB SERPL-MCNC: 0.3 MG/DL (ref 0–1.2)
BILIRUB UR QL STRIP: NEGATIVE
BUN SERPL-MCNC: 15 MG/DL (ref 6–20)
BUN/CREAT SERPL: 13.2 (ref 7–25)
CALCIUM SPEC-SCNC: 9.5 MG/DL (ref 8.6–10.5)
CHLORIDE SERPL-SCNC: 107 MMOL/L (ref 98–107)
CLARITY UR: CLEAR
CO2 SERPL-SCNC: 22.1 MMOL/L (ref 22–29)
COLOR UR: YELLOW
CREAT SERPL-MCNC: 1.14 MG/DL (ref 0.57–1)
DEPRECATED RDW RBC AUTO: 45.1 FL (ref 37–54)
EGFRCR SERPLBLD CKD-EPI 2021: 61.8 ML/MIN/1.73
EOSINOPHIL # BLD AUTO: 0.15 10*3/MM3 (ref 0–0.4)
EOSINOPHIL NFR BLD AUTO: 1.4 % (ref 0.3–6.2)
ERYTHROCYTE [DISTWIDTH] IN BLOOD BY AUTOMATED COUNT: 13.3 % (ref 12.3–15.4)
GLOBULIN UR ELPH-MCNC: 3.1 GM/DL
GLUCOSE SERPL-MCNC: 152 MG/DL (ref 65–99)
GLUCOSE UR STRIP-MCNC: NEGATIVE MG/DL
HCG INTACT+B SERPL-ACNC: <0.5 MIU/ML
HCT VFR BLD AUTO: 41 % (ref 34–46.6)
HGB BLD-MCNC: 13.4 G/DL (ref 12–15.9)
HGB UR QL STRIP.AUTO: ABNORMAL
HOLD SPECIMEN: NORMAL
HOLD SPECIMEN: NORMAL
HYALINE CASTS UR QL AUTO: ABNORMAL /LPF
IMM GRANULOCYTES # BLD AUTO: 0.06 10*3/MM3 (ref 0–0.05)
IMM GRANULOCYTES NFR BLD AUTO: 0.6 % (ref 0–0.5)
KETONES UR QL STRIP: NEGATIVE
LEUKOCYTE ESTERASE UR QL STRIP.AUTO: ABNORMAL
LIPASE SERPL-CCNC: 33 U/L (ref 13–60)
LYMPHOCYTES # BLD AUTO: 2.04 10*3/MM3 (ref 0.7–3.1)
LYMPHOCYTES NFR BLD AUTO: 19.6 % (ref 19.6–45.3)
MCH RBC QN AUTO: 30.1 PG (ref 26.6–33)
MCHC RBC AUTO-ENTMCNC: 32.7 G/DL (ref 31.5–35.7)
MCV RBC AUTO: 92.1 FL (ref 79–97)
MONOCYTES # BLD AUTO: 0.44 10*3/MM3 (ref 0.1–0.9)
MONOCYTES NFR BLD AUTO: 4.2 % (ref 5–12)
NEUTROPHILS NFR BLD AUTO: 7.63 10*3/MM3 (ref 1.7–7)
NEUTROPHILS NFR BLD AUTO: 73.2 % (ref 42.7–76)
NITRITE UR QL STRIP: POSITIVE
NRBC BLD AUTO-RTO: 0 /100 WBC (ref 0–0.2)
PH UR STRIP.AUTO: 7 [PH] (ref 5–8)
PLATELET # BLD AUTO: 408 10*3/MM3 (ref 140–450)
PMV BLD AUTO: 10.3 FL (ref 6–12)
POTASSIUM SERPL-SCNC: 5.1 MMOL/L (ref 3.5–5.2)
PROT SERPL-MCNC: 7.2 G/DL (ref 6–8.5)
PROT UR QL STRIP: NEGATIVE
RBC # BLD AUTO: 4.45 10*6/MM3 (ref 3.77–5.28)
RBC # UR STRIP: ABNORMAL /HPF
REF LAB TEST METHOD: ABNORMAL
SODIUM SERPL-SCNC: 140 MMOL/L (ref 136–145)
SP GR UR STRIP: 1.02 (ref 1–1.03)
SQUAMOUS #/AREA URNS HPF: ABNORMAL /HPF
UROBILINOGEN UR QL STRIP: ABNORMAL
WBC # UR STRIP: ABNORMAL /HPF
WBC NRBC COR # BLD AUTO: 10.42 10*3/MM3 (ref 3.4–10.8)
WHOLE BLOOD HOLD COAG: NORMAL
WHOLE BLOOD HOLD SPECIMEN: NORMAL

## 2024-04-01 PROCEDURE — 25010000002 KETOROLAC TROMETHAMINE PER 15 MG: Performed by: EMERGENCY MEDICINE

## 2024-04-01 PROCEDURE — 25010000002 METOCLOPRAMIDE PER 10 MG: Performed by: EMERGENCY MEDICINE

## 2024-04-01 PROCEDURE — 85025 COMPLETE CBC W/AUTO DIFF WBC: CPT | Performed by: EMERGENCY MEDICINE

## 2024-04-01 PROCEDURE — 83690 ASSAY OF LIPASE: CPT | Performed by: EMERGENCY MEDICINE

## 2024-04-01 PROCEDURE — 74177 CT ABD & PELVIS W/CONTRAST: CPT

## 2024-04-01 PROCEDURE — 25010000002 CEFTRIAXONE PER 250 MG

## 2024-04-01 PROCEDURE — 96375 TX/PRO/DX INJ NEW DRUG ADDON: CPT

## 2024-04-01 PROCEDURE — 84702 CHORIONIC GONADOTROPIN TEST: CPT | Performed by: EMERGENCY MEDICINE

## 2024-04-01 PROCEDURE — 25810000003 SODIUM CHLORIDE 0.9 % SOLUTION: Performed by: EMERGENCY MEDICINE

## 2024-04-01 PROCEDURE — 99285 EMERGENCY DEPT VISIT HI MDM: CPT

## 2024-04-01 PROCEDURE — 25010000002 MORPHINE PER 10 MG: Performed by: EMERGENCY MEDICINE

## 2024-04-01 PROCEDURE — 25510000001 IOPAMIDOL PER 1 ML: Performed by: EMERGENCY MEDICINE

## 2024-04-01 PROCEDURE — 96365 THER/PROPH/DIAG IV INF INIT: CPT

## 2024-04-01 PROCEDURE — 81001 URINALYSIS AUTO W/SCOPE: CPT | Performed by: EMERGENCY MEDICINE

## 2024-04-01 PROCEDURE — 80053 COMPREHEN METABOLIC PANEL: CPT | Performed by: EMERGENCY MEDICINE

## 2024-04-01 RX ORDER — METOCLOPRAMIDE HYDROCHLORIDE 5 MG/ML
5 INJECTION INTRAMUSCULAR; INTRAVENOUS ONCE
Status: COMPLETED | OUTPATIENT
Start: 2024-04-01 | End: 2024-04-01

## 2024-04-01 RX ORDER — CEPHALEXIN 500 MG/1
500 CAPSULE ORAL 2 TIMES DAILY
Qty: 14 CAPSULE | Refills: 0 | Status: SHIPPED | OUTPATIENT
Start: 2024-04-01 | End: 2024-04-08

## 2024-04-01 RX ORDER — FLUCONAZOLE 150 MG/1
150 TABLET ORAL ONCE
Qty: 1 TABLET | Refills: 0 | Status: SHIPPED | OUTPATIENT
Start: 2024-04-01 | End: 2024-04-01

## 2024-04-01 RX ORDER — SODIUM CHLORIDE 0.9 % (FLUSH) 0.9 %
10 SYRINGE (ML) INJECTION AS NEEDED
Status: DISCONTINUED | OUTPATIENT
Start: 2024-04-01 | End: 2024-04-01 | Stop reason: HOSPADM

## 2024-04-01 RX ORDER — KETOROLAC TROMETHAMINE 15 MG/ML
15 INJECTION, SOLUTION INTRAMUSCULAR; INTRAVENOUS ONCE
Status: COMPLETED | OUTPATIENT
Start: 2024-04-01 | End: 2024-04-01

## 2024-04-01 RX ORDER — MORPHINE SULFATE 2 MG/ML
2 INJECTION, SOLUTION INTRAMUSCULAR; INTRAVENOUS ONCE
Status: COMPLETED | OUTPATIENT
Start: 2024-04-01 | End: 2024-04-01

## 2024-04-01 RX ORDER — TAMSULOSIN HYDROCHLORIDE 0.4 MG/1
1 CAPSULE ORAL DAILY
Qty: 30 CAPSULE | Refills: 0 | Status: SHIPPED | OUTPATIENT
Start: 2024-04-01

## 2024-04-01 RX ADMIN — SODIUM CHLORIDE 1000 ML: 9 INJECTION, SOLUTION INTRAVENOUS at 08:22

## 2024-04-01 RX ADMIN — IOPAMIDOL 100 ML: 755 INJECTION, SOLUTION INTRAVENOUS at 08:35

## 2024-04-01 RX ADMIN — METOCLOPRAMIDE HYDROCHLORIDE 5 MG: 5 INJECTION INTRAMUSCULAR; INTRAVENOUS at 08:23

## 2024-04-01 RX ADMIN — KETOROLAC TROMETHAMINE 15 MG: 15 INJECTION, SOLUTION INTRAMUSCULAR; INTRAVENOUS at 08:22

## 2024-04-01 RX ADMIN — CEFTRIAXONE SODIUM 1000 MG: 1 INJECTION, POWDER, FOR SOLUTION INTRAMUSCULAR; INTRAVENOUS at 09:40

## 2024-04-01 RX ADMIN — MORPHINE SULFATE 2 MG: 2 INJECTION, SOLUTION INTRAMUSCULAR; INTRAVENOUS at 08:22

## 2024-04-01 NOTE — ED PROVIDER NOTES
"Time: 9:29 AM EDT  Date of encounter:  4/1/2024  Independent Historian/Clinical History and Information was obtained by:   Patient    History is limited by: N/A    Chief Complaint: Left flank pain      History of Present Illness:  Patient is a 43 y.o. year old female who presents to the emergency department for evaluation of left flank pain with associated nausea that began at 3 AM this morning.  Patient denies vomiting.  Patient states she has a history of kidney stones.  Patient denies dysuria/hematuria and fever.    HPI    Patient Care Team  Primary Care Provider: Beena Green MD    Past Medical History:     Allergies   Allergen Reactions    Meperidine Hallucinations    Tetanus Immune Globulin GI Intolerance    Amlodipine Diarrhea    Ondansetron Hcl Other (See Comments)     Fever      Tetanus Toxoid Other (See Comments)    Zofran [Ondansetron] GI Intolerance    Barley Grass Other (See Comments)    Metformin Other (See Comments)     Did not work      Molds & Smuts Other (See Comments)    Ozempic (0.25 Or 0.5 Mg-Dose) [Semaglutide(0.25 Or 0.5mg-Dos)] Other (See Comments)     \"brain fog\"     Victoza [Liraglutide] GI Intolerance     Past Medical History:   Diagnosis Date    ADHD (attention deficit hyperactivity disorder)     Allergic     Allergic rhinitis 07/10/2015    WILL ADD FLONASE 2 CETIRIZINE    Anemia     Anxiety     Asthma     B12 deficiency     Depression     Difficulty concentrating 07/15/2016    WILL TRY STRATERA    Graves disease 07/10/2015    HLD (hyperlipidemia) 07/10/2015    WILL CHECK LABS AND ADJUST MEDS ACCORDINGLY    Hyperlipidemia LDL goal <100 07/10/2015    Hypertension     Hyperthyroidism     Inappropriate sinus tachycardia 10/07/2021    05/03/21 Holter:  1.  Normal average heart rate of 98 beats per minute. 2.  There was one PVC recorded.     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    Visual impairment     Vitamin D deficiency      Past Surgical History:   Procedure Laterality Date    APPENDECTOMY  1984    CYST REMOVAL      NECK    INCISION AND DRAINAGE ABSCESS      NECK    OTHER SURGICAL HISTORY      thyroid ablation    OVARIAN CYST REMOVAL  1999    SEPTOPLASTY  2012    SINUS SURGERY  2012     Family History   Problem Relation Age of Onset    COPD Father     Cancer Father     Osteoporosis Father     Arrhythmia Father     Hypothyroidism Father     Arthritis Father     Thyroid disease Father     Vision loss Father     Mental illness Maternal Grandmother     Thyroid disease Maternal Grandmother     Stomach cancer Maternal Grandfather     Alcohol abuse Maternal Grandfather     Mental illness Maternal Grandfather     Other Mother         cognitave impairment    Osteopenia Mother     Hypothyroidism Mother     Mental illness Mother     Arthritis Mother     Hyperlipidemia Mother     Learning disabilities Mother     Thyroid disease Mother     Testicular cancer Brother     Lung cancer Paternal Grandmother     Arthritis Paternal Grandmother     Alcohol abuse Maternal Uncle     Alcohol abuse Paternal Uncle     Alcohol abuse Sister     Thyroid disease Sister     Mental illness Paternal Uncle     Mental illness Sister     Learning disabilities Sister     Thyroid disease Sister     Mental illness Sister     Mental illness Brother     Arthritis Brother     Thyroid disease Maternal Aunt        Home Medications:  Prior to Admission medications    Medication Sig Start Date End Date Taking? Authorizing Provider   NP Thyroid 15 MG tablet TAKE 1 TABLET BY MOUTH DAILY. 8/14/23   Renee Hogue APRN   Adalimumab (Humira Pen)  40 MG/0.4ML Pen-injector Kit Inject 40 mg under the skin into the appropriate area as directed Every 14 (Fourteen) Days. 10/17/22   Renee Hogue APRN   atorvastatin (LIPITOR) 20 MG tablet TAKE ONE TABLET BY MOUTH EVERY NIGHT AT BEDTIME 1/15/24   Renee Hogue APRN   azelastine (ASTELIN) 0.1 % nasal spray  4/20/21   Provider, MD Edilberto   cetirizine (zyrTEC) 10 MG tablet TAKE ONE TABLET BY MOUTH ONCE DAILY 8/14/23   Renee Hogue APRN   Cholecalciferol (Vitamin D3) 50 MCG (2000 UT) capsule Take 1 capsule by mouth Daily. 10/17/22   Renee Hogue APRN   Cholecalciferol (Vitamin D3) 50 MCG (2000 UT) tablet TAKE ONE TABLET BY MOUTH ONCE DAILY 8/14/23   Renee Hogue APRN   folic acid (FOLVITE) 1 MG tablet TAKE ONE TABLET BY MOUTH ONCE DAILY 11/14/23   Renee Hogue APRN   glucose blood (FREESTYLE LITE) test strip USE AS DIRECTED TO CHECK BLOOD GLUCOSE ONCE BEFORE BREAKFAST AND AFTER LARGEST MEAL OF THE DAY 6/14/23   Renee Hogue APRN   glucose monitor monitoring kit 1 each 2 (Two) Times a Day. E11.9 BID fasting and 2hr after largest meal 11/2/22 7/28/25  Renee Hogue APRN   ketorolac (TORADOL) 10 MG tablet Take 1 tablet by mouth Every 6 (Six) Hours As Needed for Moderate Pain or Severe Pain. 12/13/23   Renee Hogue APRN   Lancets (freestyle) lancets USE TO TEST BLOOD SUGAR TWO TIMES A DAY 4/3/23   Renee Hogue APRN   Liraglutide (VICTOZA) 18 MG/3ML solution pen-injector injection Inject 0.6 mg under the skin into the appropriate area as directed Daily for 90 days. 8/29/22 11/27/22  Renee Hogue APRN   losartan (COZAAR) 50 MG tablet TAKE 1 TABLET BY MOUTH DAILY. 8/14/23   Renee Hogue APRN   metoprolol succinate XL (TOPROL-XL) 50 MG 24 hr tablet TAKE ONE TABLET BY MOUTH ONCE DAILY 8/14/23   Renee Hogue APRN   montelukast (SINGULAIR) 10 MG tablet TAKE ONE TABLET BY MOUTH EVERY NIGHT AT BEDTIME  "8/14/23   Renee Hogue APRN   NP Thyroid 120 MG tablet TAKE ONE TABLET BY MOUTH ONCE DAILY 8/14/23   Renee Hogue APRN   predniSONE (DELTASONE) 5 MG tablet As Needed. 8/9/21   Edilberto Damon MD   Qnasl 80 MCG/ACT aerosol solution  5/2/23   Edilberto Damon MD   SUMAtriptan (IMITREX) 100 MG tablet  1/31/23   Edilberto Damon MD   topiramate (TOPAMAX) 50 MG tablet Take 1 tablet by mouth. Pt reports taking 2.5 tabs (125mg)    Edilberto Damon MD   Ventolin  (90 Base) MCG/ACT inhaler INHALE 2 PUFFS EVERY 4 (FOUR) HOURS AS NEEDED FOR WHEEZING. 12/21/22   Renee Hogue APRN        Social History:   Social History     Tobacco Use    Smoking status: Never    Smokeless tobacco: Never   Vaping Use    Vaping status: Never Used   Substance Use Topics    Alcohol use: Not Currently     Comment: rarely    Drug use: Never         Review of Systems:  Review of Systems   Constitutional:  Negative for chills and fever.   HENT:  Negative for congestion, rhinorrhea and sore throat.    Eyes:  Negative for pain and visual disturbance.   Respiratory:  Negative for apnea, cough, chest tightness and shortness of breath.    Cardiovascular:  Negative for chest pain and palpitations.   Gastrointestinal:  Positive for nausea. Negative for abdominal pain, diarrhea and vomiting.   Genitourinary:  Positive for flank pain. Negative for difficulty urinating and dysuria.   Musculoskeletal:  Negative for joint swelling and myalgias.   Skin:  Negative for color change.   Neurological:  Negative for seizures and headaches.   Psychiatric/Behavioral: Negative.     All other systems reviewed and are negative.       Physical Exam:  /95   Pulse 70   Temp 97.8 °F (36.6 °C) (Oral)   Resp 16   Ht 162.6 cm (64\")   Wt 112 kg (246 lb 6.4 oz)   SpO2 97%   BMI 42.29 kg/m²     Physical Exam  Vitals and nursing note reviewed.   Constitutional:       General: She is not in acute distress.     " Appearance: Normal appearance. She is not toxic-appearing.   HENT:      Head: Normocephalic and atraumatic.      Jaw: There is normal jaw occlusion.   Eyes:      General: Lids are normal.      Extraocular Movements: Extraocular movements intact.      Conjunctiva/sclera: Conjunctivae normal.      Pupils: Pupils are equal, round, and reactive to light.   Cardiovascular:      Rate and Rhythm: Normal rate and regular rhythm.      Pulses: Normal pulses.      Heart sounds: Normal heart sounds.   Pulmonary:      Effort: Pulmonary effort is normal. No respiratory distress.      Breath sounds: Normal breath sounds. No wheezing or rhonchi.   Abdominal:      General: Abdomen is flat.      Palpations: Abdomen is soft.      Tenderness: There is no abdominal tenderness. There is left CVA tenderness. There is no guarding or rebound.   Musculoskeletal:         General: Normal range of motion.      Cervical back: Normal range of motion and neck supple.      Right lower leg: No edema.      Left lower leg: No edema.   Skin:     General: Skin is warm and dry.   Neurological:      Mental Status: She is alert and oriented to person, place, and time. Mental status is at baseline.   Psychiatric:         Mood and Affect: Mood normal.                  Procedures:  Procedures      Medical Decision Making:      Comorbidities that affect care:    Asthma, Graves' disease, hypertension, hyperlipidemia    External Notes reviewed:          The following orders were placed and all results were independently analyzed by me:  Orders Placed This Encounter   Procedures    CT Abdomen Pelvis With Contrast    [place] Draw    Comprehensive Metabolic Panel    Lipase    Urinalysis With Microscopic If Indicated (No Culture) - Urine, Clean Catch    hCG, Quantitative, Pregnancy    CBC Auto Differential    Urinalysis, Microscopic Only - Urine, Clean Catch    Ambulatory Referral to Urology    NPO Diet NPO Type: Strict NPO    Undress & Gown    Inpatient Urology  Consult    Insert Peripheral IV    CBC & Differential    Green Top (Gel)    Lavender Top    Gold Top - SST    Light Blue Top       Medications Given in the Emergency Department:  Medications   sodium chloride 0.9 % flush 10 mL (has no administration in time range)   cefTRIAXone (ROCEPHIN) 1000 mg/100 mL 0.9% NS (MBP) (has no administration in time range)   sodium chloride 0.9 % bolus 1,000 mL (1,000 mL Intravenous New Bag 4/1/24 0822)   metoclopramide (REGLAN) injection 5 mg (5 mg Intravenous Given 4/1/24 0823)   ketorolac (TORADOL) injection 15 mg (15 mg Intravenous Given 4/1/24 0822)   morphine injection 2 mg (2 mg Intravenous Given 4/1/24 0822)   iopamidol (ISOVUE-370) 76 % injection 100 mL (100 mL Intravenous Given 4/1/24 0835)        ED Course:         Labs:    Lab Results (last 24 hours)       Procedure Component Value Units Date/Time    CBC & Differential [[id number]]  (Abnormal) Collected: 04/01/24 0751    Specimen: Blood Updated: 04/01/24 0800    Narrative:      The following orders were created for panel order CBC & Differential.  Procedure                               Abnormality         Status                     ---------                               -----------         ------                     CBC Auto Differential[[id number]]        Abnormal            Final result                 Please view results for these tests on the individual orders.    Comprehensive Metabolic Panel [[id number]]  (Abnormal) Collected: 04/01/24 0751    Specimen: Blood Updated: 04/01/24 0819     Glucose 152 mg/dL      BUN 15 mg/dL      Creatinine 1.14 mg/dL      Sodium 140 mmol/L      Potassium 5.1 mmol/L      Chloride 107 mmol/L      CO2 22.1 mmol/L      Calcium 9.5 mg/dL      Total Protein 7.2 g/dL      Albumin 4.1 g/dL      ALT (SGPT) 16 U/L      AST (SGOT) 17 U/L      Alkaline Phosphatase 70 U/L      Total Bilirubin 0.3 mg/dL      Globulin 3.1 gm/dL      A/G Ratio 1.3 g/dL      BUN/Creatinine Ratio 13.2     Anion Gap 10.9  mmol/L      eGFR 61.8 mL/min/1.73     Narrative:      GFR Normal >60  Chronic Kidney Disease <60  Kidney Failure <15      Lipase [[id number]]  (Normal) Collected: 04/01/24 0751    Specimen: Blood Updated: 04/01/24 0819     Lipase 33 U/L     hCG, Quantitative, Pregnancy [[id number]] Collected: 04/01/24 0751    Specimen: Blood Updated: 04/01/24 0825     HCG Quantitative <0.50 mIU/mL     Narrative:      HCG Ranges by Gestational Age    Females - non-pregnant premenopausal   </= 1mIU/mL HCG  Females - postmenopausal               </= 7mIU/mL HCG    3 Weeks       5.4   -      72 mIU/mL  4 Weeks      10.2   -     708 mIU/mL  5 Weeks       217   -   8,245 mIU/mL  6 Weeks       152   -  32,177 mIU/mL  7 Weeks     4,059   - 153,767 mIU/mL  8 Weeks    31,366   - 149,094 mIU/mL  9 Weeks    59,109   - 135,901 mIU/mL  10 Weeks   44,186   - 170,409 mIU/mL  12 Weeks   27,107   - 201,615 mIU/mL  14 Weeks   24,302   -  93,646 mIU/mL  15 Weeks   12,540   -  69,747 mIU/mL  16 Weeks    8,904   -  55,332 mIU/mL  17 Weeks    8,240   -  51,793 mIU/mL  18 Weeks    9,649   -  55,271 mIU/mL      CBC Auto Differential [[id number]]  (Abnormal) Collected: 04/01/24 0751    Specimen: Blood Updated: 04/01/24 0800     WBC 10.42 10*3/mm3      RBC 4.45 10*6/mm3      Hemoglobin 13.4 g/dL      Hematocrit 41.0 %      MCV 92.1 fL      MCH 30.1 pg      MCHC 32.7 g/dL      RDW 13.3 %      RDW-SD 45.1 fl      MPV 10.3 fL      Platelets 408 10*3/mm3      Neutrophil % 73.2 %      Lymphocyte % 19.6 %      Monocyte % 4.2 %      Eosinophil % 1.4 %      Basophil % 1.0 %      Immature Grans % 0.6 %      Neutrophils, Absolute 7.63 10*3/mm3      Lymphocytes, Absolute 2.04 10*3/mm3      Monocytes, Absolute 0.44 10*3/mm3      Eosinophils, Absolute 0.15 10*3/mm3      Basophils, Absolute 0.10 10*3/mm3      Immature Grans, Absolute 0.06 10*3/mm3      nRBC 0.0 /100 WBC     Urinalysis With Microscopic If Indicated (No Culture) - Urine, Clean Catch [[id number]]  (Abnormal)  Collected: 04/01/24 0759    Specimen: Urine, Clean Catch Updated: 04/01/24 0818     Color, UA Yellow     Appearance, UA Clear     pH, UA 7.0     Specific Gravity, UA 1.020     Glucose, UA Negative     Ketones, UA Negative     Bilirubin, UA Negative     Blood, UA Trace     Protein, UA Negative     Leuk Esterase, UA Trace     Nitrite, UA Positive     Urobilinogen, UA 0.2 E.U./dL    Urinalysis, Microscopic Only - Urine, Clean Catch [[id number]]  (Abnormal) Collected: 04/01/24 0759    Specimen: Urine, Clean Catch Updated: 04/01/24 0818     RBC, UA 0-2 /HPF      WBC, UA 21-50 /HPF      Bacteria, UA 3+ /HPF      Squamous Epithelial Cells, UA 21-30 /HPF      Hyaline Casts, UA None Seen /LPF      Methodology Automated Microscopy             Imaging:    CT Abdomen Pelvis With Contrast    Result Date: 4/1/2024  CT ABDOMEN PELVIS W CONTRAST-  Date of Exam: 4/1/2024 8:28 AM  Indication: left flank pain, hx of stones.  Comparison: February 20, 2024  Technique: Axial CT images were obtained of the abdomen and pelvis following the uneventful intravenous administration of 100 cc Isovue 370. Reconstructed coronal and sagittal images were also obtained. Automated exposure control and iterative construction methods were used.   Findings: ABDOMEN: The lung bases are clear. The liver, spleen, pancreas and adrenal glands are normal. There are bilateral renal calculi measuring up to 5 mm. The liver, spleen, pancreas and adrenal glands are normal. There is moderate left hydronephrosis and perinephric stranding. There are no inflammatory changes around the gallbladder.  PELVIS: No evidence of bowel obstruction, perforation or abscess. 6 cm mass in the fundus of the uterus consistent with a leiomyoma. 4.7 cm left ovarian dermoid tumor containing macroscopic fat. There is an adjacent 6 cm cyst in the left ovary. 3 mm calcification in the proximal left ureter. The abdominal aorta has a normal caliber. No CT evidence of colitis. No acute osseous  abnormalities are identified.      Impression:  1. 3 mm calcification of the proximal left ureter with moderate left hydronephrosis and perinephric stranding.  2. Bilateral nephrolithiasis.  3. 4.7 cm left ovarian dermoid tumor. An adjacent 6 cm cyst has developed in the left ovary.    Electronically Signed By-VINI NORRIS MD On:4/1/2024 8:55 AM         Differential Diagnosis and Discussion:    Flank Pain: Differential diagnosis includes but is not limited to kidney stones, pyelonephritis, musculoskeletal disorders, renal infarction, urinary tract infection, hydronephrosis, radiculopathy, aortic aneurysm, renal cell carcinoma.    All labs were reviewed and interpreted by me.  CT scan radiology impression was interpreted by me.    MDM     Amount and/or Complexity of Data Reviewed  Clinical lab tests: reviewed  Tests in the radiology section of CPT®: reviewed       The patient´s CBC that was reviewed and interpreted by me shows no abnormalities of critical concern. Of note, there is no anemia requiring a blood transfusion and the platelet count is acceptable.  Urinalysis shows evidence of UTI with nitrite positive urine.  Patient is afebrile.  Patient CT shows  3 mm calcification of the proximal left ureter with moderate left  hydronephrosis and perinephric stranding.  There is also a  4.7 cm left ovarian dermoid tumor. An adjacent 6 cm cyst has developed in the left ovary.  I spoke with urologist  who states to send the patient home on antibiotics and Flomax and follow-up with urology.  Instructed patient to return to ED in the meantime if she develops any worsening symptoms including fever, pain, nausea/vomiting.  Patient states she understands and agrees with plan of care.             Patient Care Considerations:          Consultants/Shared Management Plan:    I spoke with urologist  who states to send the patient home on antibiotics and Flomax and follow-up with urology.      Social  Determinants of Health:          Disposition and Care Coordination:    Discharged: The patient is suitable and stable for discharge with no need for consideration of admission.    I have explained the patient´s condition, diagnoses and treatment plan based on the information available to me at this time. I have answered questions and addressed any concerns. The patient has a good  understanding of the patient´s diagnosis, condition, and treatment plan as can be expected at this point. The vital signs have been stable. The patient´s condition is stable and appropriate for discharge from the emergency department.      The patient will pursue further outpatient evaluation with the primary care physician or other designated or consulting physician as outlined in the discharge instructions. They are agreeable to this plan of care and follow-up instructions have been explained in detail. The patient has received these instructions in written format and has expressed an understanding of the discharge instructions. The patient is aware that any significant change in condition or worsening of symptoms should prompt an immediate return to this or the closest emergency department or call to 911.  I have explained discharge medications and the need for follow up with the patient/caretakers. This was also printed in the discharge instructions. Patient was discharged with the following medications and follow up:      Medication List        New Prescriptions      cephalexin 500 MG capsule  Commonly known as: KEFLEX  Take 1 capsule by mouth 2 (Two) Times a Day for 7 days.     tamsulosin 0.4 MG capsule 24 hr capsule  Commonly known as: FLOMAX  Take 1 capsule by mouth Daily.               Where to Get Your Medications        These medications were sent to [facility], [facility] - [place], [place] - [address] - [phone]  - [phone]   [address] [address]      Phone: [phone]    cephalexin 500 MG capsule  tamsulosin 0.4 MG capsule 24 hr capsule      Roseanna Shukla MD  [address]  [place] [address]  [phone]    Schedule an appointment as soon as possible for a visit in 3 days  follow up       Final diagnoses:   Acute UTI   Ureteral stone   Dermoid cyst        ED Disposition       ED Disposition   Discharge    Condition   Stable    Comment   --               This medical record created using voice recognition software.             Chetan Zafar PA-C  04/01/24 0937

## 2024-06-07 ENCOUNTER — PROCEDURE VISIT (OUTPATIENT)
Dept: NEUROLOGY | Facility: CLINIC | Age: 44
End: 2024-06-07
Payer: COMMERCIAL

## 2024-06-07 VITALS
DIASTOLIC BLOOD PRESSURE: 70 MMHG | HEART RATE: 79 BPM | WEIGHT: 254 LBS | HEIGHT: 64 IN | BODY MASS INDEX: 43.36 KG/M2 | SYSTOLIC BLOOD PRESSURE: 128 MMHG

## 2024-06-07 DIAGNOSIS — G56.03 BILATERAL CARPAL TUNNEL SYNDROME: Primary | ICD-10-CM

## 2024-06-07 NOTE — PROGRESS NOTES
"Chief Complaint  NERVE STUDY (BUE) and Numbness (NUMBNESS & TINGLING IN BUE. )    Subjective          Beena Osborne is a 43 y.o. female who presents to [facility]  History of Present Illness  43-year-old woman evaluated for bilateral hand numbness for the last 6 months.  Is been worse in the last month.  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  She is also waking up in the morning with her hands numb and tingly lasting up to 5 minutes.  It usually her thumb index and middle finger.  She has rheumatoid arthritis and her rheumatologist suspected that she has carpal tunnel syndrome.  Objective   Vital Signs:   /70   Pulse 79   Ht 162.6 cm (64.02\")   Wt 115 kg (254 lb)   BMI 43.58 kg/m²     Physical Exam   No weakness of the upper extremity.  Phalen sign is positive.  Pressure at the wrist produces tingling in the thumb index and middle finger bilaterally.        Assessment and Plan  Diagnoses and all orders for this visit:    1. Bilateral carpal tunnel syndrome (Primary)  Assessment & Plan:  Nerve conduction study is normal.  Clinically she has bilateral carpal tunnel syndrome.  She is to wear wrist splints nightly for the next 3 to 4 weeks and during the daytime as well if she is able to.  She is to follow-up with orthopedic surgery if there is no improvement of her symptoms to get steroid injections to her wrist.  Thankfully participate in her care.    Orders:  -     Ambulatory Referral to Orthopedic Surgery         Nerve Conduction Study:  7 nerves     EMG:  None    Total time spent with the patient and coordinating patient care was 30 minutes.    Follow Up  No follow-ups on file.  Patient was given instructions and counseling regarding her condition or for health maintenance advice. Please see specific information pulled into the AVS if appropriate.       "

## 2024-06-07 NOTE — ASSESSMENT & PLAN NOTE
Nerve conduction study is normal.  Clinically she has bilateral carpal tunnel syndrome.  She is to wear wrist splints nightly for the next 3 to 4 weeks and during the daytime as well if she is able to.  She is to follow-up with orthopedic surgery if there is no improvement of her symptoms to get steroid injections to her wrist.  Thankfully participate in her care.

## 2024-07-31 ENCOUNTER — APPOINTMENT (OUTPATIENT)
Dept: CT IMAGING | Facility: HOSPITAL | Age: 44
End: 2024-07-31
Payer: COMMERCIAL

## 2024-07-31 ENCOUNTER — HOSPITAL ENCOUNTER (EMERGENCY)
Facility: HOSPITAL | Age: 44
Discharge: HOME | End: 2024-07-31
Attending: EMERGENCY MEDICINE
Payer: COMMERCIAL

## 2024-07-31 VITALS
OXYGEN SATURATION: 100 % | BODY MASS INDEX: 43.89 KG/M2 | WEIGHT: 257.06 LBS | SYSTOLIC BLOOD PRESSURE: 129 MMHG | HEART RATE: 64 BPM | RESPIRATION RATE: 16 BRPM | DIASTOLIC BLOOD PRESSURE: 89 MMHG | HEIGHT: 64 IN | TEMPERATURE: 98 F

## 2024-07-31 DIAGNOSIS — N23 RENAL COLIC ON RIGHT SIDE: ICD-10-CM

## 2024-07-31 DIAGNOSIS — N20.0 KIDNEY STONE ON RIGHT SIDE: Primary | ICD-10-CM

## 2024-07-31 LAB
ALBUMIN SERPL-MCNC: 4.1 G/DL (ref 3.5–5.2)
ALBUMIN/GLOB SERPL: 1.4 G/DL
ALP SERPL-CCNC: 65 U/L (ref 39–117)
ALT SERPL W P-5'-P-CCNC: 22 U/L (ref 1–33)
ANION GAP SERPL CALCULATED.3IONS-SCNC: 12 MMOL/L (ref 5–15)
AST SERPL-CCNC: 19 U/L (ref 1–32)
BACTERIA UR QL AUTO: ABNORMAL /HPF
BILIRUB SERPL-MCNC: 0.3 MG/DL (ref 0–1.2)
BILIRUB UR QL STRIP: NEGATIVE
BUN SERPL-MCNC: 16 MG/DL (ref 6–20)
BUN/CREAT SERPL: 18.6 (ref 7–25)
CALCIUM SPEC-SCNC: 9.5 MG/DL (ref 8.6–10.5)
CHLORIDE SERPL-SCNC: 104 MMOL/L (ref 98–107)
CLARITY UR: ABNORMAL
CO2 SERPL-SCNC: 20 MMOL/L (ref 22–29)
COLOR UR: YELLOW
CREAT SERPL-MCNC: 0.86 MG/DL (ref 0.57–1)
DEPRECATED RDW RBC AUTO: 46.6 FL (ref 37–54)
EGFRCR SERPLBLD CKD-EPI 2021: 86.6 ML/MIN/1.73
EOSINOPHIL # BLD MANUAL: 0.27 10*3/MM3 (ref 0–0.4)
EOSINOPHIL NFR BLD MANUAL: 3 % (ref 0.3–6.2)
ERYTHROCYTE [DISTWIDTH] IN BLOOD BY AUTOMATED COUNT: 14 % (ref 12.3–15.4)
GLOBULIN UR ELPH-MCNC: 2.9 GM/DL
GLUCOSE SERPL-MCNC: 111 MG/DL (ref 65–99)
GLUCOSE UR STRIP-MCNC: NEGATIVE MG/DL
HCG INTACT+B SERPL-ACNC: <0.5 MIU/ML
HCT VFR BLD AUTO: 41.4 % (ref 34–46.6)
HGB BLD-MCNC: 13.5 G/DL (ref 12–15.9)
HGB UR QL STRIP.AUTO: ABNORMAL
HOLD SPECIMEN: NORMAL
HOLD SPECIMEN: NORMAL
HYALINE CASTS UR QL AUTO: ABNORMAL /LPF
KETONES UR QL STRIP: NEGATIVE
LEUKOCYTE ESTERASE UR QL STRIP.AUTO: ABNORMAL
LIPASE SERPL-CCNC: 45 U/L (ref 13–60)
LYMPHOCYTES # BLD MANUAL: 5.92 10*3/MM3 (ref 0.7–3.1)
LYMPHOCYTES NFR BLD MANUAL: 3 % (ref 5–12)
MCH RBC QN AUTO: 29.7 PG (ref 26.6–33)
MCHC RBC AUTO-ENTMCNC: 32.6 G/DL (ref 31.5–35.7)
MCV RBC AUTO: 91 FL (ref 79–97)
MONOCYTES # BLD: 0.27 10*3/MM3 (ref 0.1–0.9)
NEUTROPHILS # BLD AUTO: 2.51 10*3/MM3 (ref 1.7–7)
NEUTROPHILS NFR BLD MANUAL: 28 % (ref 42.7–76)
NITRITE UR QL STRIP: NEGATIVE
PH UR STRIP.AUTO: 5.5 [PH] (ref 5–8)
PLATELET # BLD AUTO: 386 10*3/MM3 (ref 140–450)
PMV BLD AUTO: 10.9 FL (ref 6–12)
POTASSIUM SERPL-SCNC: 3.6 MMOL/L (ref 3.5–5.2)
PROT SERPL-MCNC: 7 G/DL (ref 6–8.5)
PROT UR QL STRIP: NEGATIVE
RBC # BLD AUTO: 4.55 10*6/MM3 (ref 3.77–5.28)
RBC # UR STRIP: ABNORMAL /HPF
RBC MORPH BLD: NORMAL
REF LAB TEST METHOD: ABNORMAL
SMALL PLATELETS BLD QL SMEAR: ADEQUATE
SODIUM SERPL-SCNC: 136 MMOL/L (ref 136–145)
SP GR UR STRIP: 1.02 (ref 1–1.03)
SQUAMOUS #/AREA URNS HPF: ABNORMAL /HPF
UROBILINOGEN UR QL STRIP: ABNORMAL
VARIANT LYMPHS NFR BLD MANUAL: 66 % (ref 19.6–45.3)
WBC # UR STRIP: ABNORMAL /HPF
WBC MORPH BLD: NORMAL
WBC NRBC COR # BLD AUTO: 8.97 10*3/MM3 (ref 3.4–10.8)
WHOLE BLOOD HOLD COAG: NORMAL
WHOLE BLOOD HOLD SPECIMEN: NORMAL

## 2024-07-31 PROCEDURE — 25510000001 IOPAMIDOL PER 1 ML: Performed by: EMERGENCY MEDICINE

## 2024-07-31 PROCEDURE — 83690 ASSAY OF LIPASE: CPT | Performed by: NURSE PRACTITIONER

## 2024-07-31 PROCEDURE — 80053 COMPREHEN METABOLIC PANEL: CPT | Performed by: NURSE PRACTITIONER

## 2024-07-31 PROCEDURE — 25010000002 DIPHENHYDRAMINE PER 50 MG: Performed by: NURSE PRACTITIONER

## 2024-07-31 PROCEDURE — 25810000003 SODIUM CHLORIDE 0.9 % SOLUTION: Performed by: NURSE PRACTITIONER

## 2024-07-31 PROCEDURE — 81001 URINALYSIS AUTO W/SCOPE: CPT | Performed by: NURSE PRACTITIONER

## 2024-07-31 PROCEDURE — 85025 COMPLETE CBC W/AUTO DIFF WBC: CPT | Performed by: NURSE PRACTITIONER

## 2024-07-31 PROCEDURE — 85007 BL SMEAR W/DIFF WBC COUNT: CPT | Performed by: NURSE PRACTITIONER

## 2024-07-31 PROCEDURE — 25010000002 KETOROLAC TROMETHAMINE PER 15 MG: Performed by: NURSE PRACTITIONER

## 2024-07-31 PROCEDURE — 99285 EMERGENCY DEPT VISIT HI MDM: CPT

## 2024-07-31 PROCEDURE — 74177 CT ABD & PELVIS W/CONTRAST: CPT

## 2024-07-31 PROCEDURE — 96375 TX/PRO/DX INJ NEW DRUG ADDON: CPT

## 2024-07-31 PROCEDURE — 96374 THER/PROPH/DIAG INJ IV PUSH: CPT

## 2024-07-31 PROCEDURE — 25010000002 METOCLOPRAMIDE PER 10 MG: Performed by: NURSE PRACTITIONER

## 2024-07-31 PROCEDURE — 84702 CHORIONIC GONADOTROPIN TEST: CPT | Performed by: NURSE PRACTITIONER

## 2024-07-31 RX ORDER — TAMSULOSIN HYDROCHLORIDE 0.4 MG/1
1 CAPSULE ORAL DAILY
Qty: 30 CAPSULE | Refills: 0 | Status: SHIPPED | OUTPATIENT
Start: 2024-07-31

## 2024-07-31 RX ORDER — OXYCODONE HYDROCHLORIDE AND ACETAMINOPHEN 5; 325 MG/1; MG/1
1 TABLET ORAL EVERY 6 HOURS PRN
Qty: 15 TABLET | Refills: 0 | Status: SHIPPED | OUTPATIENT
Start: 2024-07-31

## 2024-07-31 RX ORDER — KETOROLAC TROMETHAMINE 30 MG/ML
30 INJECTION, SOLUTION INTRAMUSCULAR; INTRAVENOUS ONCE
Status: COMPLETED | OUTPATIENT
Start: 2024-07-31 | End: 2024-07-31

## 2024-07-31 RX ORDER — METOCLOPRAMIDE HYDROCHLORIDE 5 MG/ML
10 INJECTION INTRAMUSCULAR; INTRAVENOUS ONCE
Status: COMPLETED | OUTPATIENT
Start: 2024-07-31 | End: 2024-07-31

## 2024-07-31 RX ORDER — DIPHENHYDRAMINE HYDROCHLORIDE 50 MG/ML
12.5 INJECTION INTRAMUSCULAR; INTRAVENOUS ONCE
Status: COMPLETED | OUTPATIENT
Start: 2024-07-31 | End: 2024-07-31

## 2024-07-31 RX ORDER — PROMETHAZINE HYDROCHLORIDE 25 MG/1
25 TABLET ORAL EVERY 6 HOURS PRN
Qty: 20 TABLET | Refills: 0 | Status: SHIPPED | OUTPATIENT
Start: 2024-07-31

## 2024-07-31 RX ORDER — KETOROLAC TROMETHAMINE 10 MG/1
10 TABLET, FILM COATED ORAL EVERY 6 HOURS PRN
Qty: 15 TABLET | Refills: 0 | Status: SHIPPED | OUTPATIENT
Start: 2024-07-31

## 2024-07-31 RX ORDER — OXYCODONE HYDROCHLORIDE AND ACETAMINOPHEN 5; 325 MG/1; MG/1
1 TABLET ORAL ONCE
Status: DISCONTINUED | OUTPATIENT
Start: 2024-07-31 | End: 2024-07-31 | Stop reason: HOSPADM

## 2024-07-31 RX ADMIN — METOCLOPRAMIDE HYDROCHLORIDE 10 MG: 5 INJECTION INTRAMUSCULAR; INTRAVENOUS at 05:27

## 2024-07-31 RX ADMIN — SODIUM CHLORIDE 500 ML: 9 INJECTION, SOLUTION INTRAVENOUS at 05:26

## 2024-07-31 RX ADMIN — DIPHENHYDRAMINE HYDROCHLORIDE 12.5 MG: 50 INJECTION, SOLUTION INTRAMUSCULAR; INTRAVENOUS at 05:27

## 2024-07-31 RX ADMIN — KETOROLAC TROMETHAMINE 30 MG: 30 INJECTION, SOLUTION INTRAMUSCULAR; INTRAVENOUS at 05:27

## 2024-07-31 RX ADMIN — IOPAMIDOL 100 ML: 755 INJECTION, SOLUTION INTRAVENOUS at 06:14

## 2024-07-31 NOTE — ED PROVIDER NOTES
"Time: 5:54 AM EDT  Date of encounter:  7/31/2024  Independent Historian/Clinical History and Information was obtained by:   Patient    History is limited by: N/A    Chief Complaint: RIGHT FLANK/ABD PAIN      History of Present Illness:      The patient presents to the emergency department and states that she has a history of kidney stones and this is the fourth time this year she thinks she has had them.  She states that she woke up suddenly at 430 with right flank pain states the pain is now started to radiate around to the front of her abdomen.  She denies any recent urinary symptoms.  She reports no recent fevers.  She states she has had nausea since this started.       History provided by:  Patient   used: No        Patient Care Team  Primary Care Provider: Beena Green MD    Past Medical History:     Allergies   Allergen Reactions    Meperidine Hallucinations    Tetanus Immune Globulin GI Intolerance    Amlodipine Diarrhea    Ondansetron Hcl Other (See Comments)     Fever      Tetanus Toxoid Other (See Comments)    Zofran [Ondansetron] GI Intolerance    Barley Grass Other (See Comments)    Metformin Other (See Comments)     Did not work      Molds & Smuts Other (See Comments)    Ozempic (0.25 Or 0.5 Mg-Dose) [Semaglutide(0.25 Or 0.5mg-Dos)] Other (See Comments)     \"brain fog\"     Victoza [Liraglutide] GI Intolerance     Past Medical History:   Diagnosis Date    ADHD (attention deficit hyperactivity disorder)     Allergic     Allergic rhinitis 07/10/2015    WILL ADD FLONASE 2 CETIRIZINE    Anemia     Anxiety     Asthma     B12 deficiency     CTS (carpal tunnel syndrome)     Depression     Diabetes mellitus     Difficulty concentrating 07/15/2016    WILL TRY STRATERA    Graves disease 07/10/2015    Headache, tension-type     HLD (hyperlipidemia) 07/10/2015    WILL CHECK LABS AND ADJUST MEDS ACCORDINGLY    Hyperlipidemia LDL goal <100 07/10/2015    Hypertension     Hyperthyroidism     " Inappropriate sinus tachycardia 10/07/2021    05/03/21 Holter:  1.  Normal average heart rate of 98 beats per minute. 2.  There was one PVC recorded.     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    Visual impairment     Vitamin D deficiency      Past Surgical History:   Procedure Laterality Date    APPENDECTOMY  1984    CYST REMOVAL      NECK    INCISION AND DRAINAGE ABSCESS      NECK    OTHER SURGICAL HISTORY      thyroid ablation    OVARIAN CYST REMOVAL  1999    SEPTOPLASTY  2012    SINUS SURGERY  2012     Family History   Problem Relation Age of Onset    COPD Father     Cancer Father     Osteoporosis Father     Arrhythmia Father     Hypothyroidism Father     Arthritis Father     Thyroid disease Father     Vision loss Father     Migraines Father     Mental illness Maternal Grandmother     Thyroid disease Maternal Grandmother     Stomach cancer Maternal Grandfather     Alcohol abuse Maternal Grandfather     Mental illness Maternal Grandfather     Other Mother         cognitave impairment    Osteopenia Mother     Hypothyroidism Mother     Mental illness Mother     Arthritis Mother     Hyperlipidemia Mother     Learning disabilities Mother     Thyroid disease Mother     Migraines Mother     Testicular cancer Brother     Lung cancer Paternal Grandmother     Arthritis Paternal Grandmother     Alcohol abuse Maternal Uncle     Alcohol abuse Paternal Uncle     Alcohol abuse Sister     Thyroid disease Sister     Mental illness Paternal Uncle     Mental illness Sister     Learning disabilities Sister     Thyroid disease Sister      Mental illness Sister     Mental illness Brother     Arthritis Brother     Thyroid disease Maternal Aunt        Home Medications:  Prior to Admission medications    Medication Sig Start Date End Date Taking? Authorizing Provider   atorvastatin (LIPITOR) 20 MG tablet TAKE ONE TABLET BY MOUTH EVERY NIGHT AT BEDTIME 1/15/24   Renee Hogue APRN   azelastine (ASTELIN) 0.1 % nasal spray  4/20/21   Provider, MD Edilberto   cetirizine (zyrTEC) 10 MG tablet TAKE ONE TABLET BY MOUTH ONCE DAILY 8/14/23   Renee Hogue APRN   Cholecalciferol (Vitamin D3) 50 MCG (2000 UT) capsule Take 1 capsule by mouth Daily. 10/17/22   Renee Hogue APRN   Cholecalciferol (Vitamin D3) 50 MCG (2000 UT) tablet TAKE ONE TABLET BY MOUTH ONCE DAILY 8/14/23   Renee Hogue APRN   folic acid (FOLVITE) 1 MG tablet TAKE ONE TABLET BY MOUTH ONCE DAILY 11/14/23   Renee Hogue APRN   glucose blood (FREESTYLE LITE) test strip USE AS DIRECTED TO CHECK BLOOD GLUCOSE ONCE BEFORE BREAKFAST AND AFTER LARGEST MEAL OF THE DAY 6/14/23   Renee Hogue APRN   glucose monitor monitoring kit 1 each 2 (Two) Times a Day. E11.9 BID fasting and 2hr after largest meal 11/2/22 7/28/25  Renee Hogue APRN   ketorolac (TORADOL) 10 MG tablet Take 1 tablet by mouth Every 6 (Six) Hours As Needed for Moderate Pain or Severe Pain. 12/13/23   Renee Hogue APRN   Lancets (freestyle) lancets USE TO TEST BLOOD SUGAR TWO TIMES A DAY 4/3/23   Renee Hogue APRN   Liraglutide (VICTOZA) 18 MG/3ML solution pen-injector injection Inject 0.6 mg under the skin into the appropriate area as directed Daily for 90 days. 8/29/22 11/27/22  Renee Hogue APRN   losartan (COZAAR) 50 MG tablet TAKE 1 TABLET BY MOUTH DAILY. 8/14/23   Renee Hogue APRN   metoprolol succinate XL (TOPROL-XL) 50 MG 24 hr tablet TAKE ONE TABLET BY MOUTH ONCE DAILY 8/14/23   Renee Hogue, MIC rosales  (SINGULAIR) 10 MG tablet TAKE ONE TABLET BY MOUTH EVERY NIGHT AT BEDTIME 8/14/23   Renee Hogue APRN NP Thyroid 120 MG tablet TAKE ONE TABLET BY MOUTH ONCE DAILY 8/14/23   Renee Hogue APRN NP Thyroid 15 MG tablet TAKE 1 TABLET BY MOUTH DAILY. 8/14/23   Renee Hogue APRN   predniSONE (DELTASONE) 5 MG tablet Take 1 tablet by mouth As Needed. 8/9/21   Edilberto Damon MD   Qnasl 80 MCG/ACT aerosol solution 1 each Take As Directed. 5/2/23   Edilberto Damon MD   SUMAtriptan (IMITREX) 100 MG tablet Take 1 tablet by mouth 1 (One) Time As Needed. 1/31/23   Edilberto Damon MD   tamsulosin (FLOMAX) 0.4 MG capsule 24 hr capsule Take 1 capsule by mouth Daily. 4/1/24   Chetan Zafar PA-C   topiramate (TOPAMAX) 50 MG tablet Take 1 tablet by mouth. Pt reports taking 2.5 tabs (125mg)    Edilberto Damon MD   Upadacitinib ER 15 MG tablet sustained-release 24 hour Take 1 tablet by mouth Daily. Will start 06/09/24 5/28/24   Edilberto Damon MD   Ventolin  (90 Base) MCG/ACT inhaler INHALE 2 PUFFS EVERY 4 (FOUR) HOURS AS NEEDED FOR WHEEZING. 12/21/22   Renee Hogue APRN        Social History:   Social History     Tobacco Use    Smoking status: Never     Passive exposure: Never    Smokeless tobacco: Never   Vaping Use    Vaping status: Never Used   Substance Use Topics    Alcohol use: Not Currently     Comment: rarely    Drug use: Never         Review of Systems:  Review of Systems   Constitutional:  Negative for chills and fever.   HENT:  Negative for congestion, ear pain and sore throat.    Eyes:  Negative for pain.   Respiratory:  Negative for cough, chest tightness and shortness of breath.    Cardiovascular:  Negative for chest pain.   Gastrointestinal:  Positive for abdominal pain and nausea. Negative for diarrhea and vomiting.   Genitourinary:  Positive for flank pain. Negative for difficulty urinating, dysuria, frequency, hematuria, urgency, vaginal  "bleeding and vaginal discharge.   Musculoskeletal:  Positive for back pain. Negative for joint swelling, neck pain and neck stiffness.   Skin:  Negative for pallor and rash.   Neurological:  Negative for seizures and headaches.   All other systems reviewed and are negative.       Physical Exam:  /57   Pulse 65   Temp 97.6 °F (36.4 °C) (Oral)   Resp 20   Ht 162.6 cm (64\")   Wt 117 kg (257 lb 0.9 oz)   LMP  (LMP Unknown)   SpO2 98%   BMI 44.12 kg/m²     Physical Exam  Vitals and nursing note reviewed.   Constitutional:       General: She is not in acute distress.     Appearance: Normal appearance. She is not ill-appearing or toxic-appearing.   HENT:      Head: Normocephalic and atraumatic.   Eyes:      General: No scleral icterus.     Conjunctiva/sclera: Conjunctivae normal.      Pupils: Pupils are equal, round, and reactive to light.   Cardiovascular:      Rate and Rhythm: Normal rate and regular rhythm.      Pulses: Normal pulses.   Pulmonary:      Effort: Pulmonary effort is normal. No respiratory distress.   Abdominal:      General: Abdomen is flat.      Palpations: Abdomen is soft.      Tenderness: There is no abdominal tenderness. There is no guarding or rebound.   Musculoskeletal:         General: Normal range of motion.      Cervical back: Normal range of motion.   Skin:     General: Skin is warm and dry.      Capillary Refill: Capillary refill takes less than 2 seconds.      Findings: No rash.   Neurological:      General: No focal deficit present.      Mental Status: She is alert and oriented to person, place, and time. Mental status is at baseline.   Psychiatric:         Mood and Affect: Mood normal.         Behavior: Behavior normal.                Procedures:  Procedures      Medical Decision Making:      Comorbidities that affect care:    Rhinitis, anxiety, B12 deficiency, hyperlipidemia, difficulty concentrating, probably ovarian cyst syndrome, psoriasis, hypothyroidism, ADHD, " hypertension, hyperlipidemia, low back pain, visual impairment, headaches, diabetes, anemia, asthma, depression, Graves' disease, morbid obesity, insomnia, rheumatoid arthritis, vitamin D deficiency, sinus tachycardia, hyperthyroidism, pneumonia, migraines, carpal tunnel syndrome, kidney stone    External Notes reviewed:    None      The following orders were placed and all results were independently analyzed by me:  Orders Placed This Encounter   Procedures    CT Abdomen Pelvis With Contrast    [place] Draw    Comprehensive Metabolic Panel    Lipase    Urinalysis With Microscopic If Indicated (No Culture) - Urine, Clean Catch    CBC Auto Differential    hCG, Quantitative, Pregnancy    Scan Slide    Urinalysis, Microscopic Only - Urine, Clean Catch    Manual Differential    CBC & Differential    Green Top (Gel)    Lavender Top    Gold Top - SST    Light Blue Top       Medications Given in the Emergency Department:  Medications   oxyCODONE-acetaminophen (PERCOCET) 5-325 MG per tablet 1 tablet (1 tablet Oral Not Given 7/31/24 0647)   sodium chloride 0.9 % bolus 500 mL (0 mL Intravenous Stopped 7/31/24 0609)   ketorolac (TORADOL) injection 30 mg (30 mg Intravenous Given 7/31/24 0527)   metoclopramide (REGLAN) injection 10 mg (10 mg Intravenous Given 7/31/24 0527)   diphenhydrAMINE (BENADRYL) injection 12.5 mg (12.5 mg Intravenous Given 7/31/24 0527)   iopamidol (ISOVUE-370) 76 % injection 100 mL (100 mL Intravenous Given 7/31/24 0614)        ED Course:    ED Course as of 07/31/24 0705 Wed Jul 31, 2024 0659 I discussed with the patient her contaminated urine specimen.  She confirmed again that she has had no urinary symptoms and no fevers at home.  I did explain to her that we would culture her urine and should it come back with any Type of bacteria that they would call her and get her started on antibiotics.  She also verbalized that she would monitor closely for fevers and should she develop any fevers or start  having any urinary symptoms that she would return to the emergency department immediately.  She also verbalized follow-up instructions with her urologist in [place]. [TC]      ED Course User Index  [TC] Izabel Corbin APRN       Labs:    Lab Results (last 24 hours)       Procedure Component Value Units Date/Time    Urinalysis With Microscopic If Indicated (No Culture) - Urine, Clean Catch [[id number]]  (Abnormal) Collected: 07/31/24 0520    Specimen: Urine, Clean Catch Updated: 07/31/24 0600     Color, UA Yellow     Appearance, UA Cloudy     pH, UA 5.5     Specific Gravity, UA 1.024     Glucose, UA Negative     Ketones, UA Negative     Bilirubin, UA Negative     Blood, UA Moderate (2+)     Protein, UA Negative     Leuk Esterase, UA Trace     Nitrite, UA Negative     Urobilinogen, UA 0.2 E.U./dL    Urinalysis, Microscopic Only - Urine, Clean Catch [[id number]]  (Abnormal) Collected: 07/31/24 0520    Specimen: Urine, Clean Catch Updated: 07/31/24 0601     RBC, UA 21-50 /HPF      WBC, UA 11-20 /HPF      Bacteria, UA 4+ /HPF      Squamous Epithelial Cells, UA 13-20 /HPF      Hyaline Casts, UA 3-6 /LPF      Methodology Automated Microscopy    CBC & Differential [[id number]] Collected: 07/31/24 0524    Specimen: Blood Updated: 07/31/24 0544    Narrative:      The following orders were created for panel order CBC & Differential.  Procedure                               Abnormality         Status                     ---------                               -----------         ------                     CBC Auto Differential[[id number]]        Normal              Preliminary result         Scan Slide[[id number]]                                       In process                   Please view results for these tests on the individual orders.    Comprehensive Metabolic Panel [[id number]]  (Abnormal) Collected: 07/31/24 0524    Specimen: Blood Updated: 07/31/24 0602     Glucose 111 mg/dL      BUN 16 mg/dL      Creatinine 0.86 mg/dL       Sodium 136 mmol/L      Potassium 3.6 mmol/L      Chloride 104 mmol/L      CO2 20.0 mmol/L      Calcium 9.5 mg/dL      Total Protein 7.0 g/dL      Albumin 4.1 g/dL      ALT (SGPT) 22 U/L      AST (SGOT) 19 U/L      Alkaline Phosphatase 65 U/L      Total Bilirubin 0.3 mg/dL      Globulin 2.9 gm/dL      A/G Ratio 1.4 g/dL      BUN/Creatinine Ratio 18.6     Anion Gap 12.0 mmol/L      eGFR 86.6 mL/min/1.73     Narrative:      GFR Normal >60  Chronic Kidney Disease <60  Kidney Failure <15      Lipase [[id number]]  (Normal) Collected: 07/31/24 0524    Specimen: Blood Updated: 07/31/24 0602     Lipase 45 U/L     CBC Auto Differential [[id number]]  (Normal) Collected: 07/31/24 0524    Specimen: Blood Updated: 07/31/24 0700     WBC 8.97 10*3/mm3      RBC 4.55 10*6/mm3      Hemoglobin 13.5 g/dL      Hematocrit 41.4 %      MCV 91.0 fL      MCH 29.7 pg      MCHC 32.6 g/dL      RDW 14.0 %      RDW-SD 46.6 fl      MPV 10.9 fL      Platelets 386 10*3/mm3     hCG, Quantitative, Pregnancy [[id number]] Collected: 07/31/24 0524    Specimen: Blood Updated: 07/31/24 0601     HCG Quantitative <0.50 mIU/mL     Narrative:      HCG Ranges by Gestational Age    Females - non-pregnant premenopausal   </= 1mIU/mL HCG  Females - postmenopausal               </= 7mIU/mL HCG    3 Weeks       5.4   -      72 mIU/mL  4 Weeks      10.2   -     708 mIU/mL  5 Weeks       217   -   8,245 mIU/mL  6 Weeks       152   -  32,177 mIU/mL  7 Weeks     4,059   - 153,767 mIU/mL  8 Weeks    31,366   - 149,094 mIU/mL  9 Weeks    59,109   - 135,901 mIU/mL  10 Weeks   44,186   - 170,409 mIU/mL  12 Weeks   27,107   - 201,615 mIU/mL  14 Weeks   24,302   -  93,646 mIU/mL  15 Weeks   12,540   -  69,747 mIU/mL  16 Weeks    8,904   -  55,332 mIU/mL  17 Weeks    8,240   -  51,793 mIU/mL  18 Weeks    9,649   -  55,271 mIU/mL      Scan Slide [[id number]] Collected: 07/31/24 0524    Specimen: Blood Updated: 07/31/24 0544    Manual Differential [[id number]] Collected: 07/31/24  0524    Specimen: Blood Updated: 07/31/24 0659             Imaging:    CT Abdomen Pelvis With Contrast    Result Date: 7/31/2024  CT ABDOMEN PELVIS W CONTRAST-  Date of exam: 7/31/2024, 6:03 A.M.  Indications: RIGHT FLANK PAIN; RIGHT ABD. PAIN.  Comparisons: 4/1/2024; 2/20/2024; 11/25/2022.  TECHNIQUE: Axial CT images were obtained of the abdomen and pelvis following the uneventful intravenous administration of 100 mL of Isovue-370 contrast agent. Reconstructed 2D (two-dimensional) coronal and sagittal images were also obtained. Automated exposure control and iterative construction methods were used. No oral contrast agent was administered for the study.  FINDINGS: There is obstructive uropathy on the right due to a 6 mm calculus at the L2-3 level with moderate-to-severe right hydronephrosis and right hydroureter. The obstructing calculus is seen on image 82 of series 202 and adjacent images. Nonobstructing renal calyceal stones are seen and measure about 4 mm or less in size. There may be tiny nonobstructing left renal calyceal stones, measuring about 2 mm. No left hydronephrosis or ureterolithiasis. No acute pyelonephritis. The patient has undergone appendectomy. There is a 4.6 cm left-sided dermoid cyst. This finding was seen previously. Uterine fibroids (or leiomyomas) are suspected, possibly measuring as large as 5 cm. Gallbladder sludge and/or gallstones cannot be excluded. No acute cholecystitis or pancreatitis. No other acute findings are seen. The other incidental nonemergent findings are as described in the prior exam reports (such as from 4/1/2024).       The study is ABNORMAL. There is obstructive uropathy on the RIGHT due to a 6 mm proximal calculus (at or just distal to the right UPJ, ureterovesical junction) with moderate-to-severe RIGHT hydronephrosis. Please see above comments for further detail.     Please note that portions of this note were completed with a voice recognition program.    Electronically Signed By-Milton Mcmillan MD On:7/31/2024 6:30 AM         Differential Diagnosis and Discussion:    Abdominal Pain: Based on the patient's signs and symptoms, I considered abdominal aortic aneurysm, small bowel obstruction, pancreatitis, acute cholecystitis, acute appendecitis, peptic ulcer disease, gastritis, colitis, endocrine disorders, irritable bowel syndrome and other differential diagnosis an etiology of the patient's abdominal pain.  Back Pain: The patient presents with back pain. My differential diagnosis includes but is not limited to acute spinal epidural abscess, acute spinal epidural bleed, cauda equina syndrome, abdominal aortic aneurysm, aortic dissection, kidney stone, pyelonephritis, musculoskeletal back pain, spinal fracture, and osteoarthritis.     All labs were reviewed and interpreted by me.  CT scan radiology impression was interpreted by me.    MDM     Amount and/or Complexity of Data Reviewed  Clinical lab tests: reviewed  Tests in the radiology section of CPT®: reviewed         Patient Care Considerations:    ANTIBIOTICS: I considered prescribing antibiotics as an outpatient however no bacterial focus of infection was found.      Consultants/Shared Management Plan:    None    Social Determinants of Health:    Patient is independent, reliable, and has access to care.       Disposition and Care Coordination:    Discharged: The patient is suitable and stable for discharge with no need for consideration of admission.    I have explained the patient´s condition, diagnoses and treatment plan based on the information available to me at this time. I have answered questions and addressed any concerns. The patient has a good  understanding of the patient´s diagnosis, condition, and treatment plan as can be expected at this point. The vital signs have been stable. The patient´s condition is stable and appropriate for discharge from the emergency department.      The patient will pursue  further outpatient evaluation with the primary care physician or other designated or consulting physician as outlined in the discharge instructions. They are agreeable to this plan of care and follow-up instructions have been explained in detail. The patient has received these instructions in written format and has expressed an understanding of the discharge instructions. The patient is aware that any significant change in condition or worsening of symptoms should prompt an immediate return to this or the closest emergency department or call to 911.  I have explained discharge medications and the need for follow up with the patient/caretakers. This was also printed in the discharge instructions. Patient was discharged with the following medications and follow up:      Medication List        New Prescriptions      ketorolac 10 MG tablet  Commonly known as: TORADOL  Take 1 tablet by mouth Every 6 (Six) Hours As Needed for Moderate Pain.     oxyCODONE-acetaminophen 5-325 MG per tablet  Commonly known as: PERCOCET  Take 1 tablet by mouth Every 6 (Six) Hours As Needed for Moderate Pain.     promethazine 25 MG tablet  Commonly known as: PHENERGAN  Take 1 tablet by mouth Every 6 (Six) Hours As Needed for Nausea or Vomiting.     tamsulosin 0.4 MG capsule 24 hr capsule  Commonly known as: FLOMAX  Take 1 capsule by mouth Daily.               Where to Get Your Medications        These medications were sent to [facility], [facility] - [place], [place] - [address] [phone] [facility] [phone]   [address] [address]      Phone: [phone]   ketorolac 10 MG tablet  oxyCODONE-acetaminophen 5-325 MG per tablet  promethazine 25 MG tablet  tamsulosin 0.4 MG capsule 24 hr capsule      YOUR UROLOGIST IN [place]    Call today  FOR FOLLOW UP    Beena Green MD  [address]  [address]  [place] [address] [phone]      As needed, FOR FOLLOW UP       Final diagnoses:   Kidney  stone on right side   Renal colic on right side        ED Disposition       ED Disposition   Discharge    Condition   Stable    Comment   --               This medical record created using voice recognition software.             Iazbel Corbin APRN  07/31/24 0705       Izabel Corbin APRN  07/31/24 0706

## 2024-07-31 NOTE — DISCHARGE INSTRUCTIONS
Your urine was cultured in the emergency department.  It was found to be very contaminated in your sample today so if it should grow out a bacteria that needs to have your antibiotics changed they will call and do so.  Rest, drink plenty of fluids.  Take your meds as prescribed.  Call Dr. Bray's office today advised them of your ER visit and your kidney stone and follow-up with them as directed.  Follow-up with your family doctor as needed.  Return to the emergency department immediately for any acutely developing fever of 101 or greater, any persistent vomiting, any unmanaged pain at home with your medications, or any new or worse concerns.

## 2024-08-02 ENCOUNTER — HOSPITAL ENCOUNTER (EMERGENCY)
Facility: HOSPITAL | Age: 44
Discharge: HOME | End: 2024-08-02
Attending: EMERGENCY MEDICINE
Payer: COMMERCIAL

## 2024-08-02 VITALS
WEIGHT: 257.72 LBS | BODY MASS INDEX: 44 KG/M2 | HEART RATE: 78 BPM | DIASTOLIC BLOOD PRESSURE: 61 MMHG | RESPIRATION RATE: 18 BRPM | SYSTOLIC BLOOD PRESSURE: 106 MMHG | OXYGEN SATURATION: 98 % | TEMPERATURE: 99.8 F | HEIGHT: 64 IN

## 2024-08-02 DIAGNOSIS — R50.9 ACUTE FEBRILE ILLNESS: Primary | ICD-10-CM

## 2024-08-02 DIAGNOSIS — N39.0 ACUTE UTI (URINARY TRACT INFECTION): ICD-10-CM

## 2024-08-02 LAB
ALBUMIN SERPL-MCNC: 3.8 G/DL (ref 3.5–5.2)
ALBUMIN/GLOB SERPL: 1.3 G/DL
ALP SERPL-CCNC: 56 U/L (ref 39–117)
ALT SERPL W P-5'-P-CCNC: 16 U/L (ref 1–33)
ANION GAP SERPL CALCULATED.3IONS-SCNC: 10.8 MMOL/L (ref 5–15)
AST SERPL-CCNC: 16 U/L (ref 1–32)
BACTERIA UR QL AUTO: ABNORMAL /HPF
BASOPHILS # BLD AUTO: 0.03 10*3/MM3 (ref 0–0.2)
BASOPHILS NFR BLD AUTO: 0.3 % (ref 0–1.5)
BILIRUB SERPL-MCNC: 0.6 MG/DL (ref 0–1.2)
BILIRUB UR QL STRIP: NEGATIVE
BUN SERPL-MCNC: 11 MG/DL (ref 6–20)
BUN/CREAT SERPL: 11.5 (ref 7–25)
CALCIUM SPEC-SCNC: 8.9 MG/DL (ref 8.6–10.5)
CHLORIDE SERPL-SCNC: 107 MMOL/L (ref 98–107)
CLARITY UR: CLEAR
CO2 SERPL-SCNC: 18.2 MMOL/L (ref 22–29)
COLOR UR: YELLOW
CREAT SERPL-MCNC: 0.96 MG/DL (ref 0.57–1)
D-LACTATE SERPL-SCNC: 1.3 MMOL/L (ref 0.5–2)
DEPRECATED RDW RBC AUTO: 46.5 FL (ref 37–54)
EGFRCR SERPLBLD CKD-EPI 2021: 75.9 ML/MIN/1.73
EOSINOPHIL # BLD AUTO: 0.02 10*3/MM3 (ref 0–0.4)
EOSINOPHIL NFR BLD AUTO: 0.2 % (ref 0.3–6.2)
ERYTHROCYTE [DISTWIDTH] IN BLOOD BY AUTOMATED COUNT: 14.1 % (ref 12.3–15.4)
FLUAV SUBTYP SPEC NAA+PROBE: NOT DETECTED
FLUBV RNA ISLT QL NAA+PROBE: NOT DETECTED
GLOBULIN UR ELPH-MCNC: 2.9 GM/DL
GLUCOSE SERPL-MCNC: 116 MG/DL (ref 65–99)
GLUCOSE UR STRIP-MCNC: NEGATIVE MG/DL
HCG INTACT+B SERPL-ACNC: <0.5 MIU/ML
HCT VFR BLD AUTO: 35.4 % (ref 34–46.6)
HGB BLD-MCNC: 11.5 G/DL (ref 12–15.9)
HGB UR QL STRIP.AUTO: NEGATIVE
HOLD SPECIMEN: NORMAL
HOLD SPECIMEN: NORMAL
HYALINE CASTS UR QL AUTO: ABNORMAL /LPF
IMM GRANULOCYTES # BLD AUTO: 0.05 10*3/MM3 (ref 0–0.05)
IMM GRANULOCYTES NFR BLD AUTO: 0.6 % (ref 0–0.5)
KETONES UR QL STRIP: NEGATIVE
LEUKOCYTE ESTERASE UR QL STRIP.AUTO: NEGATIVE
LIPASE SERPL-CCNC: 25 U/L (ref 13–60)
LYMPHOCYTES # BLD AUTO: 1.77 10*3/MM3 (ref 0.7–3.1)
LYMPHOCYTES NFR BLD AUTO: 19.6 % (ref 19.6–45.3)
MCH RBC QN AUTO: 29.2 PG (ref 26.6–33)
MCHC RBC AUTO-ENTMCNC: 32.5 G/DL (ref 31.5–35.7)
MCV RBC AUTO: 89.8 FL (ref 79–97)
MONOCYTES # BLD AUTO: 1.2 10*3/MM3 (ref 0.1–0.9)
MONOCYTES NFR BLD AUTO: 13.3 % (ref 5–12)
NEUTROPHILS NFR BLD AUTO: 5.97 10*3/MM3 (ref 1.7–7)
NEUTROPHILS NFR BLD AUTO: 66 % (ref 42.7–76)
NITRITE UR QL STRIP: NEGATIVE
NRBC BLD AUTO-RTO: 0 /100 WBC (ref 0–0.2)
PH UR STRIP.AUTO: 8 [PH] (ref 5–8)
PLATELET # BLD AUTO: 283 10*3/MM3 (ref 140–450)
PMV BLD AUTO: 10.2 FL (ref 6–12)
POTASSIUM SERPL-SCNC: 4.1 MMOL/L (ref 3.5–5.2)
PROT SERPL-MCNC: 6.7 G/DL (ref 6–8.5)
PROT UR QL STRIP: ABNORMAL
RBC # BLD AUTO: 3.94 10*6/MM3 (ref 3.77–5.28)
RBC # UR STRIP: ABNORMAL /HPF
REF LAB TEST METHOD: ABNORMAL
RSV RNA NPH QL NAA+NON-PROBE: NOT DETECTED
S PYO AG THROAT QL: NEGATIVE
SARS-COV-2 RNA RESP QL NAA+PROBE: NOT DETECTED
SODIUM SERPL-SCNC: 136 MMOL/L (ref 136–145)
SP GR UR STRIP: 1.02 (ref 1–1.03)
SQUAMOUS #/AREA URNS HPF: ABNORMAL /HPF
UROBILINOGEN UR QL STRIP: ABNORMAL
WBC # UR STRIP: ABNORMAL /HPF
WBC NRBC COR # BLD AUTO: 9.04 10*3/MM3 (ref 3.4–10.8)
WHOLE BLOOD HOLD COAG: NORMAL
WHOLE BLOOD HOLD SPECIMEN: NORMAL

## 2024-08-02 PROCEDURE — 87081 CULTURE SCREEN ONLY: CPT | Performed by: EMERGENCY MEDICINE

## 2024-08-02 PROCEDURE — 85025 COMPLETE CBC W/AUTO DIFF WBC: CPT

## 2024-08-02 PROCEDURE — 83605 ASSAY OF LACTIC ACID: CPT | Performed by: EMERGENCY MEDICINE

## 2024-08-02 PROCEDURE — 96365 THER/PROPH/DIAG IV INF INIT: CPT

## 2024-08-02 PROCEDURE — 84702 CHORIONIC GONADOTROPIN TEST: CPT

## 2024-08-02 PROCEDURE — 87637 SARSCOV2&INF A&B&RSV AMP PRB: CPT | Performed by: EMERGENCY MEDICINE

## 2024-08-02 PROCEDURE — 83690 ASSAY OF LIPASE: CPT

## 2024-08-02 PROCEDURE — 80053 COMPREHEN METABOLIC PANEL: CPT

## 2024-08-02 PROCEDURE — 81001 URINALYSIS AUTO W/SCOPE: CPT | Performed by: REGISTERED NURSE

## 2024-08-02 PROCEDURE — 87086 URINE CULTURE/COLONY COUNT: CPT | Performed by: REGISTERED NURSE

## 2024-08-02 PROCEDURE — 87040 BLOOD CULTURE FOR BACTERIA: CPT | Performed by: EMERGENCY MEDICINE

## 2024-08-02 PROCEDURE — 87880 STREP A ASSAY W/OPTIC: CPT | Performed by: EMERGENCY MEDICINE

## 2024-08-02 PROCEDURE — 25010000002 CEFTRIAXONE PER 250 MG: Performed by: EMERGENCY MEDICINE

## 2024-08-02 PROCEDURE — 99283 EMERGENCY DEPT VISIT LOW MDM: CPT

## 2024-08-02 PROCEDURE — 36415 COLL VENOUS BLD VENIPUNCTURE: CPT | Performed by: EMERGENCY MEDICINE

## 2024-08-02 RX ORDER — ACETAMINOPHEN 325 MG/1
650 TABLET ORAL ONCE
Status: COMPLETED | OUTPATIENT
Start: 2024-08-02 | End: 2024-08-02

## 2024-08-02 RX ORDER — SODIUM CHLORIDE 0.9 % (FLUSH) 0.9 %
10 SYRINGE (ML) INJECTION AS NEEDED
Status: DISCONTINUED | OUTPATIENT
Start: 2024-08-02 | End: 2024-08-03 | Stop reason: HOSPADM

## 2024-08-02 RX ORDER — CEPHALEXIN 500 MG/1
500 CAPSULE ORAL 3 TIMES DAILY
Qty: 21 CAPSULE | Refills: 0 | Status: SHIPPED | OUTPATIENT
Start: 2024-08-02

## 2024-08-02 RX ORDER — CEPHALEXIN 500 MG/1
500 CAPSULE ORAL 3 TIMES DAILY
Qty: 21 CAPSULE | Refills: 0 | Status: SHIPPED | OUTPATIENT
Start: 2024-08-02 | End: 2024-08-02

## 2024-08-02 RX ADMIN — CEFTRIAXONE SODIUM 2000 MG: 2 INJECTION, POWDER, FOR SOLUTION INTRAMUSCULAR; INTRAVENOUS at 22:00

## 2024-08-02 RX ADMIN — ACETAMINOPHEN 650 MG: 325 TABLET ORAL at 17:58

## 2024-08-02 NOTE — ED PROVIDER NOTES
"Time: 5:39 PM EDT  Date of encounter:  8/2/2024  Independent Historian/Clinical History and Information was obtained by:   Patient    History is limited by: N/A    Chief Complaint   Patient presents with    Generalized Body Aches    Fever    Nausea         History of Present Illness:  Patient is a 43 y.o. year old female who presents to the emergency department for evaluation of fever/chills, nausea/vomiting.  Patient was diagnosed with a ureteral stone on Wednesday that she passed yesterday afternoon.  Saw her PCP today and was referred to ED for further evaluation.  Is febrile on arrival.  She is positive that she passed the 6 mm ureteral stone that was seen the other day on CT scan during urination the other day.  She has had a few episodes of urinary hesitancy or discomfort but not actual burning dysuria.  Denies any flank pain currently.    No recent sick contacts reported, no sore throat or congestion but she does have a mild dry cough.  Fever present on arrival was 101.7 Fahrenheit.    Patient Care Team  Primary Care Provider: Beena Green MD    Past Medical History:     Allergies   Allergen Reactions    Meperidine Hallucinations    Tetanus Immune Globulin GI Intolerance    Amlodipine Diarrhea    Ondansetron Hcl Other (See Comments)     Fever      Tetanus Toxoid Other (See Comments)    Zofran [Ondansetron] GI Intolerance    Barley Grass Other (See Comments)    Metformin Other (See Comments)     Did not work      Molds & Smuts Other (See Comments)    Ozempic (0.25 Or 0.5 Mg-Dose) [Semaglutide(0.25 Or 0.5mg-Dos)] Other (See Comments)     \"brain fog\"     Victoza [Liraglutide] GI Intolerance     Past Medical History:   Diagnosis Date    ADHD (attention deficit hyperactivity disorder)     Allergic     Allergic rhinitis 07/10/2015    WILL ADD FLONASE 2 CETIRIZINE    Anemia     Anxiety     Asthma     B12 deficiency     CTS (carpal tunnel syndrome)     Depression     Diabetes mellitus     Difficulty " concentrating 07/15/2016    WILL TRY STRATERA    Graves disease 07/10/2015    Headache, tension-type     HLD (hyperlipidemia) 07/10/2015    WILL CHECK LABS AND ADJUST MEDS ACCORDINGLY    Hyperlipidemia LDL goal <100 07/10/2015    Hypertension     Hyperthyroidism     Inappropriate sinus tachycardia 10/07/2021    05/03/21 Holter:  1.  Normal average heart rate of 98 beats per minute. 2.  There was one PVC recorded.     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    Visual impairment     Vitamin D deficiency      Past Surgical History:   Procedure Laterality Date    APPENDECTOMY  1984    CYST REMOVAL      NECK    INCISION AND DRAINAGE ABSCESS      NECK    OTHER SURGICAL HISTORY      thyroid ablation    OVARIAN CYST REMOVAL  1999    SEPTOPLASTY  2012    SINUS SURGERY  2012     Family History   Problem Relation Age of Onset    COPD Father     Cancer Father     Osteoporosis Father     Arrhythmia Father     Hypothyroidism Father     Arthritis Father     Thyroid disease Father     Vision loss Father     Migraines Father     Mental illness Maternal Grandmother     Thyroid disease Maternal Grandmother     Stomach cancer Maternal Grandfather     Alcohol abuse Maternal Grandfather     Mental illness Maternal Grandfather     Other Mother         cognitave impairment    Osteopenia Mother     Hypothyroidism Mother     Mental illness Mother     Arthritis Mother     Hyperlipidemia Mother     Learning disabilities Mother     Thyroid disease Mother     Migraines Mother     Testicular cancer Brother     Lung cancer Paternal Grandmother     Arthritis  Paternal Grandmother     Alcohol abuse Maternal Uncle     Alcohol abuse Paternal Uncle     Alcohol abuse Sister     Thyroid disease Sister     Mental illness Paternal Uncle     Mental illness Sister     Learning disabilities Sister     Thyroid disease Sister     Mental illness Sister     Mental illness Brother     Arthritis Brother     Thyroid disease Maternal Aunt        Home Medications:  Prior to Admission medications    Medication Sig Start Date End Date Taking? Authorizing Provider   atorvastatin (LIPITOR) 20 MG tablet TAKE ONE TABLET BY MOUTH EVERY NIGHT AT BEDTIME 1/15/24   Renee Hogue APRN   azelastine (ASTELIN) 0.1 % nasal spray  4/20/21   Provider, MD Edilberto   cetirizine (zyrTEC) 10 MG tablet TAKE ONE TABLET BY MOUTH ONCE DAILY 8/14/23   Renee Hogue APRN   Cholecalciferol (Vitamin D3) 50 MCG (2000 UT) capsule Take 1 capsule by mouth Daily. 10/17/22   Renee Hogue APRN   Cholecalciferol (Vitamin D3) 50 MCG (2000 UT) tablet TAKE ONE TABLET BY MOUTH ONCE DAILY 8/14/23   Renee Hogue APRN   folic acid (FOLVITE) 1 MG tablet TAKE ONE TABLET BY MOUTH ONCE DAILY 11/14/23   Renee Hogue APRN   glucose blood (FREESTYLE LITE) test strip USE AS DIRECTED TO CHECK BLOOD GLUCOSE ONCE BEFORE BREAKFAST AND AFTER LARGEST MEAL OF THE DAY 6/14/23   Renee Hogue APRN   glucose monitor monitoring kit 1 each 2 (Two) Times a Day. E11.9 BID fasting and 2hr after largest meal 11/2/22 7/28/25  Renee Hogue APRN   ketorolac (TORADOL) 10 MG tablet Take 1 tablet by mouth Every 6 (Six) Hours As Needed for Moderate Pain. 7/31/24   Izabel Corbin APRN   Lancets (freestyle) lancets USE TO TEST BLOOD SUGAR TWO TIMES A DAY 4/3/23   Renee Hogue APRN   Liraglutide (VICTOZA) 18 MG/3ML solution pen-injector injection Inject 0.6 mg under the skin into the appropriate area as directed Daily for 90 days. 8/29/22 11/27/22  Renee Hogue APRN   losartan  (COZAAR) 50 MG tablet TAKE 1 TABLET BY MOUTH DAILY. 8/14/23   Renee Hogue APRN   metoprolol succinate XL (TOPROL-XL) 50 MG 24 hr tablet TAKE ONE TABLET BY MOUTH ONCE DAILY 8/14/23   Renee Hogue APRN   montelukast (SINGULAIR) 10 MG tablet TAKE ONE TABLET BY MOUTH EVERY NIGHT AT BEDTIME 8/14/23   Renee Hogue APRN NP Thyroid 120 MG tablet TAKE ONE TABLET BY MOUTH ONCE DAILY 8/14/23   Renee Hogue APRN NP Thyroid 15 MG tablet TAKE 1 TABLET BY MOUTH DAILY. 8/14/23   Renee Hogue APRN   oxyCODONE-acetaminophen (PERCOCET) 5-325 MG per tablet Take 1 tablet by mouth Every 6 (Six) Hours As Needed for Moderate Pain. 7/31/24   Maximino Rodriguez MD   predniSONE (DELTASONE) 5 MG tablet Take 1 tablet by mouth As Needed. 8/9/21   Edilberto Damon MD   promethazine (PHENERGAN) 25 MG tablet Take 1 tablet by mouth Every 6 (Six) Hours As Needed for Nausea or Vomiting. 7/31/24   Izabel Corbin APRN   Qnasl 80 MCG/ACT aerosol solution 1 each Take As Directed. 5/2/23   Edilberto Damon MD   SUMAtriptan (IMITREX) 100 MG tablet Take 1 tablet by mouth 1 (One) Time As Needed. 1/31/23   Edilberto Damon MD   tamsulosin (FLOMAX) 0.4 MG capsule 24 hr capsule Take 1 capsule by mouth Daily. 7/31/24   Izabel Corbin APRN   topiramate (TOPAMAX) 50 MG tablet Take 1 tablet by mouth. Pt reports taking 2.5 tabs (125mg)    Edilberto Damon MD   Upadacitinib ER 15 MG tablet sustained-release 24 hour Take 1 tablet by mouth Daily. Will start 06/09/24 5/28/24   Edilberto Damon MD   Ventolin  (90 Base) MCG/ACT inhaler INHALE 2 PUFFS EVERY 4 (FOUR) HOURS AS NEEDED FOR WHEEZING. 12/21/22   Renee Hogue APRN        Social History:   Social History     Tobacco Use    Smoking status: Never     Passive exposure: Never    Smokeless tobacco: Never   Vaping Use    Vaping status: Never Used   Substance Use Topics    Alcohol use: Not Currently     Comment: rarely    Drug  "use: Never         Review of Systems:  Review of Systems   Constitutional:  Positive for chills and fever.   Gastrointestinal:  Positive for nausea and vomiting.   Musculoskeletal:  Positive for myalgias.        Physical Exam:  /61   Pulse 75   Temp 100.3 °F (37.9 °C) (Oral)   Resp 18   Ht 162.6 cm (64\")   Wt 117 kg (257 lb 11.5 oz)   LMP  (LMP Unknown)   SpO2 99%   BMI 44.24 kg/m²         General: Awake alert and in no obvious distress, febrile on arrival    HEENT: Head normocephalic atraumatic, eyes PERRLA EOMI, nose normal, oropharynx normal.    Neck: Supple full range of motion, no meningismus, no lymphadenopathy    Heart: Regular rate and rhythm, no murmurs or rubs, 2+ radial pulses bilaterally    Lungs: Clear to auscultation bilaterally without wheezes or crackles, no respiratory distress    Abdomen: Soft, moderate suprapubic tenderness only, no right lower quadrant or right upper quadrant tenderness to palpation, nondistended, no rebound or guarding    Skin: Warm, dry, no rash    Musculoskeletal: Normal range of motion, no lower extremity edema    Neurologic: Oriented x3, no motor deficits no sensory deficits    Psychiatric: Mood appears stable, no psychosis              Procedures:  Procedures      Medical Decision Making:      Comorbidities that affect care:    Kidney stones    External Notes reviewed:    Previous Radiological Studies: I reviewed her most recent CT scan of the abdomen pelvis demonstrating obstructing 6 mm right ureteral stone from 3 days ago      The following orders were placed and all results were independently analyzed by me:  Orders Placed This Encounter   Procedures    Blood Culture - Blood,    Blood Culture - Blood,    Urine Culture - Urine,    COVID-19, FLU A/B, RSV PCR 1 HR TAT - Swab, Nasopharynx    Rapid Strep A Screen - Swab, Throat    Beta Strep Culture, Throat - Swab, Throat    [place] Draw    Comprehensive Metabolic Panel    Lipase    hCG, Quantitative, Pregnancy "    CBC Auto Differential    Lactic Acid, Plasma    Urinalysis With Culture If Indicated - Urine, Clean Catch    Urinalysis, Microscopic Only - Urine, Clean Catch    NPO Diet NPO Type: Strict NPO    Undress & Gown    Insert Peripheral IV    CBC & Differential    Green Top (Gel)    Lavender Top    Gold Top - SST    Light Blue Top       Medications Given in the Emergency Department:  Medications   sodium chloride 0.9 % flush 10 mL (has no administration in time range)   cefTRIAXone (ROCEPHIN) 2,000 mg in sodium chloride 0.9 % 100 mL IVPB-VTB (has no administration in time range)   acetaminophen (TYLENOL) tablet 650 mg (650 mg Oral Given 8/2/24 1758)        ED Course:    The patient was initially evaluated in the triage area where orders were placed. The patient was later dispositioned by Dg Menendez MD.      The patient was advised to stay for completion of workup which includes but is not limited to communication of labs and radiological results, reassessment and plan. The patient was advised that leaving prior to disposition by a provider could result in critical findings that are not communicated to the patient.          Labs:    Lab Results (last 24 hours)       Procedure Component Value Units Date/Time    CBC & Differential [[id number]]  (Abnormal) Collected: 08/02/24 1750    Specimen: Blood from Arm, Right Updated: 08/02/24 1800    Narrative:      The following orders were created for panel order CBC & Differential.  Procedure                               Abnormality         Status                     ---------                               -----------         ------                     CBC Auto Differential[[id number]]        Abnormal            Final result                 Please view results for these tests on the individual orders.    Comprehensive Metabolic Panel [[id number]]  (Abnormal) Collected: 08/02/24 1750    Specimen: Blood from Arm, Right Updated: 08/02/24 1826     Glucose 116 mg/dL      BUN 11  mg/dL      Creatinine 0.96 mg/dL      Sodium 136 mmol/L      Potassium 4.1 mmol/L      Chloride 107 mmol/L      CO2 18.2 mmol/L      Calcium 8.9 mg/dL      Total Protein 6.7 g/dL      Albumin 3.8 g/dL      ALT (SGPT) 16 U/L      AST (SGOT) 16 U/L      Alkaline Phosphatase 56 U/L      Total Bilirubin 0.6 mg/dL      Globulin 2.9 gm/dL      A/G Ratio 1.3 g/dL      BUN/Creatinine Ratio 11.5     Anion Gap 10.8 mmol/L      eGFR 75.9 mL/min/1.73     Narrative:      GFR Normal >60  Chronic Kidney Disease <60  Kidney Failure <15      Lipase [[id number]]  (Normal) Collected: 08/02/24 1750    Specimen: Blood from Arm, Right Updated: 08/02/24 1826     Lipase 25 U/L     hCG, Quantitative, Pregnancy [[id number]] Collected: 08/02/24 1750    Specimen: Blood from Arm, Right Updated: 08/02/24 1822     HCG Quantitative <0.50 mIU/mL     Narrative:      HCG Ranges by Gestational Age    Females - non-pregnant premenopausal   </= 1mIU/mL HCG  Females - postmenopausal               </= 7mIU/mL HCG    3 Weeks       5.4   -      72 mIU/mL  4 Weeks      10.2   -     708 mIU/mL  5 Weeks       217   -   8,245 mIU/mL  6 Weeks       152   -  32,177 mIU/mL  7 Weeks     4,059   - 153,767 mIU/mL  8 Weeks    31,366   - 149,094 mIU/mL  9 Weeks    59,109   - 135,901 mIU/mL  10 Weeks   44,186   - 170,409 mIU/mL  12 Weeks   27,107   - 201,615 mIU/mL  14 Weeks   24,302   -  93,646 mIU/mL  15 Weeks   12,540   -  69,747 mIU/mL  16 Weeks    8,904   -  55,332 mIU/mL  17 Weeks    8,240   -  51,793 mIU/mL  18 Weeks    9,649   -  55,271 mIU/mL      CBC Auto Differential [[id number]]  (Abnormal) Collected: 08/02/24 1750    Specimen: Blood from Arm, Right Updated: 08/02/24 1800     WBC 9.04 10*3/mm3      RBC 3.94 10*6/mm3      Hemoglobin 11.5 g/dL      Hematocrit 35.4 %      MCV 89.8 fL      MCH 29.2 pg      MCHC 32.5 g/dL      RDW 14.1 %      RDW-SD 46.5 fl      MPV 10.2 fL      Platelets 283 10*3/mm3      Neutrophil % 66.0 %      Lymphocyte % 19.6 %      Monocyte %  13.3 %      Eosinophil % 0.2 %      Basophil % 0.3 %      Immature Grans % 0.6 %      Neutrophils, Absolute 5.97 10*3/mm3      Lymphocytes, Absolute 1.77 10*3/mm3      Monocytes, Absolute 1.20 10*3/mm3      Eosinophils, Absolute 0.02 10*3/mm3      Basophils, Absolute 0.03 10*3/mm3      Immature Grans, Absolute 0.05 10*3/mm3      nRBC 0.0 /100 WBC     Blood Culture - Blood, Arm, Right [[id number]] Collected: 08/02/24 1750    Specimen: Blood from Arm, Right Updated: 08/02/24 1755    Lactic Acid, Plasma [[id number]]  (Normal) Collected: 08/02/24 1750    Specimen: Blood from Arm, Right Updated: 08/02/24 1822     Lactate 1.3 mmol/L     Urinalysis With Culture If Indicated - Urine, Clean Catch [[id number]]  (Abnormal) Collected: 08/02/24 1807    Specimen: Urine, Clean Catch Updated: 08/02/24 1842     Color, UA Yellow     Appearance, UA Clear     pH, UA 8.0     Specific Gravity, UA 1.016     Glucose, UA Negative     Ketones, UA Negative     Bilirubin, UA Negative     Blood, UA Negative     Protein, UA 30 mg/dL (1+)     Leuk Esterase, UA Negative     Nitrite, UA Negative     Urobilinogen, UA 1.0 E.U./dL    Narrative:      In absence of clinical symptoms, the presence of pyuria, bacteria, and/or nitrites on the urinalysis result does not correlate with infection.    Urinalysis, Microscopic Only - Urine, Clean Catch [[id number]]  (Abnormal) Collected: 08/02/24 1807    Specimen: Urine, Clean Catch Updated: 08/02/24 1842     RBC, UA 3-5 /HPF      WBC, UA 6-10 /HPF      Bacteria, UA 1+ /HPF      Squamous Epithelial Cells, UA 0-2 /HPF      Hyaline Casts, UA 0-2 /LPF      Methodology Automated Microscopy    Urine Culture - Urine, Urine, Clean Catch [[id number]] Collected: 08/02/24 1807    Specimen: Urine, Clean Catch Updated: 08/02/24 1842    COVID-19, FLU A/B, RSV PCR 1 HR TAT - Swab, Nasopharynx [[id number]]  (Normal) Collected: 08/02/24 2038    Specimen: Swab from Nasopharynx Updated: 08/02/24 2126     COVID19 Not Detected      Influenza A PCR Not Detected     Influenza B PCR Not Detected     RSV, PCR Not Detected    Narrative:      Fact sheet for providers: https://www.fda.gov/media/423040/download    Fact sheet for patients: https://www.fda.gov/media/143641/download    Test performed by PCR.    Rapid Strep A Screen - Swab, Throat [[id number]]  (Normal) Collected: 08/02/24 2038    Specimen: Swab from Throat Updated: 08/02/24 2100     Strep A Ag Negative    Beta Strep Culture, Throat - Swab, Throat [[id number]] Collected: 08/02/24 2038    Specimen: Swab from Throat Updated: 08/02/24 2100             Imaging:    No Radiology Exams Resulted Within Past 24 Hours      Differential Diagnosis and Discussion:      Fever: Based on the complaint of fever, differential diagnosis includes but is not limited to meningitis, pneumonia, pyelonephritis, acute uti,  systemic immune response syndrome, sepsis, viral syndrome, fungal infection, tick born illness and other bacterial infections.  Vomiting: Differential diagnosis includes but is not limited to migraine, labyrinthine disorders, psychogenic, metabolic and endocrine causes, peptic ulcer, gastric outlet obstruction, gastritis, gastroenteritis, appendicitis, intestinal obstruction, paralytic ileus, food poisoning, cholecystitis, acute hepatitis, acute pancreatitis, acute febrile illness, and myocardial infarction.    All labs were reviewed and interpreted by me.    MDM     Amount and/or Complexity of Data Reviewed  Clinical lab tests: reviewed         This patient is a pleasant 43-year-old female who came in with fever and vomiting in the setting of recently passing a kidney stone 2 days ago but now having some urinary hesitancy or discomfort.    Urinalysis looks like she could have a mild UTI but it is not nitrite positive and no large leukocyte Estrace.    She has a normal white blood cell count.  She has normal renal function today and has a normal lactic acid of 1.3.    I considered possibility of  UTI or ureteritis but she does not appear to have flank pain or obvious pyelonephritis.    She is not experiencing renal colic and just passed a kidney stone but I have low suspicion for an new obstructing stone at this time.    I am giving her a dose of IV ceftriaxone here and some Tylenol for fever.    COVID-19 and flu swabs are negative.  Strep swab negative.    She looks stable to be discharged home now that she is defervesced and looks improved clinically.    I will start her on Keflex this week and Tylenol and ibuprofen, rest, fluids.  Return precautions given.                    Patient Care Considerations:          Consultants/Shared Management Plan:        Social Determinants of Health:    Patient is independent, reliable, and has access to care.       Disposition and Care Coordination:    Discharged: The patient is suitable and stable for discharge with no need for consideration of admission.    I have explained the patient´s condition, diagnoses and treatment plan based on the information available to me at this time. I have answered questions and addressed any concerns. The patient has a good  understanding of the patient´s diagnosis, condition, and treatment plan as can be expected at this point. The vital signs have been stable. The patient´s condition is stable and appropriate for discharge from the emergency department.      The patient will pursue further outpatient evaluation with the primary care physician or other designated or consulting physician as outlined in the discharge instructions. They are agreeable to this plan of care and follow-up instructions have been explained in detail. The patient has received these instructions in written format and has expressed an understanding of the discharge instructions. The patient is aware that any significant change in condition or worsening of symptoms should prompt an immediate return to this or the closest emergency department or call to 911.  I  have explained discharge medications and the need for follow up with the patient/caretakers. This was also printed in the discharge instructions. Patient was discharged with the following medications and follow up:      Medication List        New Prescriptions      cephalexin 500 MG capsule  Commonly known as: KEFLEX  Take 1 capsule by mouth 3 (Three) Times a Day.               Where to Get Your Medications        These medications were sent to [facility] - [place], [place] - [address] [facility] [phone] [facility] [phone] [address] [address]      Phone: [phone]   cephalexin 500 MG capsule      No follow-up provider specified.     Final diagnoses:   Acute febrile illness   Acute UTI (urinary tract infection)        ED Disposition       ED Disposition   Discharge    Condition   Stable    Comment   --               This medical record created using voice recognition software.             Dg Menendez MD  08/02/24 1163

## 2024-08-03 NOTE — DISCHARGE INSTRUCTIONS
Your swabs for COVID-19 and flu and strep throat came back negative.    Your blood work actually look good today but it looks like you may have a mild UTI.    Get plenty of rest and drink extra fluids stay hydrated and treat fevers with Tylenol or ibuprofen as needed.    You were given a dose of IV antibiotics but still need to take the Keflex antibiotics this week to cover for UTI.    Please return to the ER if you start getting worse.

## 2024-08-04 LAB — BACTERIA SPEC AEROBE CULT: NO GROWTH

## 2024-08-05 LAB — BACTERIA SPEC AEROBE CULT: NORMAL

## 2024-08-07 LAB
BACTERIA SPEC AEROBE CULT: NORMAL
BACTERIA SPEC AEROBE CULT: NORMAL